# Patient Record
Sex: FEMALE | Race: WHITE | Employment: OTHER | URBAN - NONMETROPOLITAN AREA
[De-identification: names, ages, dates, MRNs, and addresses within clinical notes are randomized per-mention and may not be internally consistent; named-entity substitution may affect disease eponyms.]

---

## 2020-04-15 RX ORDER — OMEPRAZOLE 40 MG/1
40 CAPSULE, DELAYED RELEASE ORAL DAILY
COMMUNITY
End: 2021-01-11 | Stop reason: ALTCHOICE

## 2020-04-15 RX ORDER — FLUTICASONE PROPIONATE 50 MCG
2 SPRAY, SUSPENSION (ML) NASAL DAILY
COMMUNITY

## 2020-04-15 RX ORDER — AZELASTINE 1 MG/ML
1 SPRAY, METERED NASAL 2 TIMES DAILY
COMMUNITY
End: 2021-01-11 | Stop reason: SDUPTHER

## 2020-04-15 RX ORDER — LISINOPRIL 10 MG/1
10 TABLET ORAL DAILY
COMMUNITY
End: 2021-01-11 | Stop reason: ALTCHOICE

## 2020-04-15 RX ORDER — POTASSIUM CHLORIDE 750 MG/1
10 CAPSULE, EXTENDED RELEASE ORAL DAILY
COMMUNITY

## 2020-04-15 RX ORDER — BENZONATATE 200 MG/1
200 CAPSULE ORAL 3 TIMES DAILY PRN
COMMUNITY
End: 2020-07-10 | Stop reason: ALTCHOICE

## 2020-04-15 RX ORDER — ONDANSETRON 8 MG/1
4 TABLET, ORALLY DISINTEGRATING ORAL EVERY 8 HOURS PRN
COMMUNITY
End: 2020-07-10 | Stop reason: ALTCHOICE

## 2020-04-15 RX ORDER — PREDNISONE 20 MG/1
20 TABLET ORAL DAILY
COMMUNITY
End: 2020-05-04 | Stop reason: ALTCHOICE

## 2020-04-15 RX ORDER — MONTELUKAST SODIUM 4 MG/1
1 TABLET, CHEWABLE ORAL 2 TIMES DAILY
COMMUNITY
End: 2022-08-01

## 2020-04-15 RX ORDER — ALBUTEROL SULFATE 90 UG/1
2 AEROSOL, METERED RESPIRATORY (INHALATION) EVERY 6 HOURS PRN
COMMUNITY
End: 2022-11-09 | Stop reason: SDUPTHER

## 2020-04-15 RX ORDER — LORATADINE 10 MG/1
10 CAPSULE, LIQUID FILLED ORAL DAILY
COMMUNITY
End: 2021-01-11 | Stop reason: SDUPTHER

## 2020-04-15 RX ORDER — ALPRAZOLAM 0.25 MG/1
0.25 TABLET ORAL PRN
COMMUNITY
End: 2021-01-11 | Stop reason: SDUPTHER

## 2020-04-15 RX ORDER — BACLOFEN 20 MG/1
20 TABLET ORAL 4 TIMES DAILY
COMMUNITY
End: 2020-07-10 | Stop reason: SDUPTHER

## 2020-04-15 RX ORDER — ALBUTEROL SULFATE 2.5 MG/3ML
2.5 SOLUTION RESPIRATORY (INHALATION) 3 TIMES DAILY
COMMUNITY
End: 2022-11-09 | Stop reason: SDUPTHER

## 2020-04-15 RX ORDER — LANSOPRAZOLE 30 MG/1
30 CAPSULE, DELAYED RELEASE ORAL DAILY
COMMUNITY
End: 2020-07-10 | Stop reason: ALTCHOICE

## 2020-04-15 RX ORDER — MONTELUKAST SODIUM 10 MG/1
10 TABLET ORAL DAILY
COMMUNITY
End: 2020-07-10 | Stop reason: ALTCHOICE

## 2020-04-15 SDOH — HEALTH STABILITY: MENTAL HEALTH: HOW OFTEN DO YOU HAVE A DRINK CONTAINING ALCOHOL?: NEVER

## 2020-04-20 ENCOUNTER — HOSPITAL ENCOUNTER (OUTPATIENT)
Dept: LAB | Age: 65
Discharge: HOME | End: 2020-04-20
Payer: COMMERCIAL

## 2020-04-20 ENCOUNTER — OFFICE VISIT (OUTPATIENT)
Dept: ONCOLOGY | Age: 65
End: 2020-04-20
Payer: COMMERCIAL

## 2020-04-20 VITALS
OXYGEN SATURATION: 97 % | BODY MASS INDEX: 48.49 KG/M2 | DIASTOLIC BLOOD PRESSURE: 68 MMHG | WEIGHT: 284 LBS | HEART RATE: 77 BPM | SYSTOLIC BLOOD PRESSURE: 116 MMHG | TEMPERATURE: 97.8 F | HEIGHT: 64 IN

## 2020-04-20 PROBLEM — D50.0 IRON DEFICIENCY ANEMIA DUE TO CHRONIC BLOOD LOSS: Status: ACTIVE | Noted: 2020-04-20

## 2020-04-20 PROBLEM — K90.9 IRON MALABSORPTION: Status: ACTIVE | Noted: 2020-04-20

## 2020-04-20 LAB
ABSOLUTE EOS #: 0.21 K/UL (ref 0–0.44)
ABSOLUTE IMMATURE GRANULOCYTE: 0.03 K/UL (ref 0–0.3)
ABSOLUTE LYMPH #: 1.81 K/UL (ref 1.1–3.7)
ABSOLUTE MONO #: 0.5 K/UL (ref 0.1–1.2)
ABSOLUTE RETIC #: 0.05 M/UL (ref 0.03–0.08)
BASOPHILS # BLD: 1 % (ref 0–2)
BASOPHILS ABSOLUTE: 0.05 K/UL (ref 0–0.2)
DIFFERENTIAL TYPE: ABNORMAL
EOSINOPHILS RELATIVE PERCENT: 3 % (ref 1–4)
FERRITIN: 10 UG/L (ref 13–150)
FOLATE: 8.8 NG/ML
HAPTOGLOBIN: 274 MG/DL (ref 30–200)
HCT VFR BLD CALC: 34.3 % (ref 36.3–47.1)
HEMOGLOBIN: 10.2 G/DL (ref 11.9–15.1)
IMMATURE GRANULOCYTES: 0 %
IMMATURE RETIC FRACT: 30.9 % (ref 2.7–18.3)
IRON SATURATION: 5 % (ref 20–55)
IRON: 20 UG/DL (ref 37–145)
LACTATE DEHYDROGENASE: 166 U/L (ref 135–214)
LYMPHOCYTES # BLD: 26 % (ref 24–43)
MCH RBC QN AUTO: 24.4 PG (ref 25.2–33.5)
MCHC RBC AUTO-ENTMCNC: 29.7 G/DL (ref 25.2–33.5)
MCV RBC AUTO: 82.1 FL (ref 82.6–102.9)
MONOCYTES # BLD: 7 % (ref 3–12)
NRBC AUTOMATED: 0 PER 100 WBC
PDW BLD-RTO: 17.6 % (ref 11.8–14.4)
PLATELET # BLD: 409 K/UL (ref 138–453)
PLATELET ESTIMATE: ABNORMAL
PMV BLD AUTO: 8.6 FL (ref 8.1–13.5)
RBC # BLD: 4.18 M/UL (ref 3.95–5.11)
RBC # BLD: ABNORMAL 10*6/UL
RETIC %: 1.2 % (ref 0.5–1.9)
RETIC HEMOGLOBIN: 27.1 PG (ref 28.2–35.7)
SEG NEUTROPHILS: 63 % (ref 36–65)
SEGMENTED NEUTROPHILS ABSOLUTE COUNT: 4.46 K/UL (ref 1.5–8.1)
TOTAL IRON BINDING CAPACITY: 388 UG/DL (ref 250–450)
UNSATURATED IRON BINDING CAPACITY: 368 UG/DL (ref 112–347)
VITAMIN B-12: 414 PG/ML (ref 232–1245)
WBC # BLD: 7.1 K/UL (ref 3.5–11.3)
WBC # BLD: ABNORMAL 10*3/UL

## 2020-04-20 PROCEDURE — 85045 AUTOMATED RETICULOCYTE COUNT: CPT

## 2020-04-20 PROCEDURE — 99244 OFF/OP CNSLTJ NEW/EST MOD 40: CPT | Performed by: INTERNAL MEDICINE

## 2020-04-20 PROCEDURE — 85025 COMPLETE CBC W/AUTO DIFF WBC: CPT

## 2020-04-20 PROCEDURE — 82668 ASSAY OF ERYTHROPOIETIN: CPT

## 2020-04-20 PROCEDURE — 83550 IRON BINDING TEST: CPT

## 2020-04-20 PROCEDURE — 82728 ASSAY OF FERRITIN: CPT

## 2020-04-20 PROCEDURE — 36415 COLL VENOUS BLD VENIPUNCTURE: CPT

## 2020-04-20 PROCEDURE — 82607 VITAMIN B-12: CPT

## 2020-04-20 PROCEDURE — 82746 ASSAY OF FOLIC ACID SERUM: CPT

## 2020-04-20 PROCEDURE — 83615 LACTATE (LD) (LDH) ENZYME: CPT

## 2020-04-20 PROCEDURE — 83540 ASSAY OF IRON: CPT

## 2020-04-20 PROCEDURE — 83010 ASSAY OF HAPTOGLOBIN QUANT: CPT

## 2020-04-20 RX ORDER — DIPHENHYDRAMINE HYDROCHLORIDE 50 MG/ML
50 INJECTION INTRAMUSCULAR; INTRAVENOUS ONCE
Status: CANCELLED | OUTPATIENT
Start: 2020-04-20

## 2020-04-20 RX ORDER — METHYLPREDNISOLONE SODIUM SUCCINATE 125 MG/2ML
125 INJECTION, POWDER, LYOPHILIZED, FOR SOLUTION INTRAMUSCULAR; INTRAVENOUS ONCE
Status: CANCELLED | OUTPATIENT
Start: 2020-04-20

## 2020-04-20 RX ORDER — SODIUM CHLORIDE 9 MG/ML
INJECTION, SOLUTION INTRAVENOUS CONTINUOUS
Status: CANCELLED | OUTPATIENT
Start: 2020-04-20

## 2020-04-20 RX ORDER — SODIUM CHLORIDE 0.9 % (FLUSH) 0.9 %
5 SYRINGE (ML) INJECTION PRN
Status: CANCELLED | OUTPATIENT
Start: 2020-04-20

## 2020-04-20 RX ORDER — EPINEPHRINE 1 MG/ML
0.3 INJECTION, SOLUTION, CONCENTRATE INTRAVENOUS PRN
Status: CANCELLED | OUTPATIENT
Start: 2020-04-20

## 2020-04-20 RX ORDER — LANOLIN ALCOHOL/MO/W.PET/CERES
325 CREAM (GRAM) TOPICAL 2 TIMES DAILY
Qty: 60 TABLET | Refills: 5 | Status: SHIPPED | OUTPATIENT
Start: 2020-04-20 | End: 2021-01-06 | Stop reason: SDUPTHER

## 2020-04-20 RX ORDER — SODIUM CHLORIDE 0.9 % (FLUSH) 0.9 %
10 SYRINGE (ML) INJECTION PRN
Status: CANCELLED | OUTPATIENT
Start: 2020-04-20

## 2020-04-20 RX ORDER — HEPARIN SODIUM (PORCINE) LOCK FLUSH IV SOLN 100 UNIT/ML 100 UNIT/ML
500 SOLUTION INTRAVENOUS PRN
Status: CANCELLED | OUTPATIENT
Start: 2020-04-20

## 2020-04-20 NOTE — PROGRESS NOTES
Gastrointestinal: negative for nausea, vomiting, diarrhea, constipation, abdominal pain, Dysphagia, hematemesis and hematochezia   Genitourinary: negative for frequency, dysuria, nocturia, urinary incontinence, and hematuria   Integument: negative for rash, skin lesions, bruises.    Hematologic/Lymphatic: negative for easy bruising, bleeding, lymphadenopathy, petechiae and swelling/edema   Endocrine: negative for heat or cold intolerance, tremor, weight changes, change in bowel habits and hair loss   Musculoskeletal: negative for myalgias, arthralgias, pain, joint swelling,and bone pain   Neurological: negative for headaches, dizziness, seizures, weakness, numbness  OBJECTIVE:         Vitals:    04/20/20 1408   BP: 116/68   Pulse: 77   Temp: 97.8 °F (36.6 °C)   SpO2: 97%     PHYSICAL EXAM:   General appearance - well appearing, no in pain or distress   Mental status - alert and cooperative   Eyes - pupils equal and reactive, extraocular eye movements intact   Ears - bilateral TM's and external ear canals normal   Mouth - mucous membranes moist, pharynx normal without lesions   Neck - supple, no significant adenopathy   Lymphatics - no palpable lymphadenopathy, no hepatosplenomegaly   Chest - clear to auscultation, no wheezes, rales or rhonchi, symmetric air entry   Heart - normal rate, regular rhythm, normal S1, S2, no murmurs, rubs, clicks or gallops   Abdomen - soft, nontender, nondistended, no masses or organomegaly   Neurological - alert, oriented, normal speech, no focal findings or movement disorder noted   Musculoskeletal - no joint tenderness, deformity or swelling   Extremities - peripheral pulses normal, no pedal edema, no clubbing or cyanosis   Skin - normal coloration and turgor, no rashes, no suspicious skin lesions noted   LABORATORY DATA:   No results found for: WBC, HGB, HCT, MCV, PLT, LABLYMP, MID, GRAN, LYMPHOPCT, MIDPERCENT, GRANULOCYTES, RBC, MCH, MCHC, RDW, NEUTOPHILPCT, MONOPCT, EOSPCT,

## 2020-04-21 LAB — SURGICAL PATHOLOGY REPORT: NORMAL

## 2020-04-22 LAB — PATHOLOGIST REVIEW: NORMAL

## 2020-04-23 LAB — ERYTHROPOIETIN: 66 MU/ML (ref 4–27)

## 2020-04-29 ENCOUNTER — HOSPITAL ENCOUNTER (OUTPATIENT)
Dept: INFUSION THERAPY | Age: 65
Discharge: HOME | End: 2020-04-29
Payer: COMMERCIAL

## 2020-04-29 VITALS
SYSTOLIC BLOOD PRESSURE: 114 MMHG | TEMPERATURE: 97.4 F | HEART RATE: 77 BPM | RESPIRATION RATE: 16 BRPM | DIASTOLIC BLOOD PRESSURE: 57 MMHG | OXYGEN SATURATION: 96 %

## 2020-04-29 DIAGNOSIS — K90.9 IRON MALABSORPTION: Primary | ICD-10-CM

## 2020-04-29 DIAGNOSIS — D50.0 IRON DEFICIENCY ANEMIA DUE TO CHRONIC BLOOD LOSS: ICD-10-CM

## 2020-04-29 PROCEDURE — 2580000003 HC RX 258: Performed by: INTERNAL MEDICINE

## 2020-04-29 PROCEDURE — 6360000002 HC RX W HCPCS: Performed by: INTERNAL MEDICINE

## 2020-04-29 PROCEDURE — 96365 THER/PROPH/DIAG IV INF INIT: CPT

## 2020-04-29 RX ORDER — SODIUM CHLORIDE 9 MG/ML
INJECTION, SOLUTION INTRAVENOUS CONTINUOUS
Status: CANCELLED | OUTPATIENT
Start: 2020-05-06

## 2020-04-29 RX ORDER — METHYLPREDNISOLONE SODIUM SUCCINATE 125 MG/2ML
125 INJECTION, POWDER, LYOPHILIZED, FOR SOLUTION INTRAMUSCULAR; INTRAVENOUS ONCE
Status: CANCELLED | OUTPATIENT
Start: 2020-05-06

## 2020-04-29 RX ORDER — SODIUM CHLORIDE 9 MG/ML
INJECTION, SOLUTION INTRAVENOUS CONTINUOUS
Status: ACTIVE | OUTPATIENT
Start: 2020-04-29 | End: 2020-04-29

## 2020-04-29 RX ORDER — DIPHENHYDRAMINE HYDROCHLORIDE 50 MG/ML
50 INJECTION INTRAMUSCULAR; INTRAVENOUS ONCE
Status: CANCELLED | OUTPATIENT
Start: 2020-05-06

## 2020-04-29 RX ORDER — HEPARIN SODIUM (PORCINE) LOCK FLUSH IV SOLN 100 UNIT/ML 100 UNIT/ML
500 SOLUTION INTRAVENOUS PRN
Status: CANCELLED | OUTPATIENT
Start: 2020-05-06

## 2020-04-29 RX ORDER — SODIUM CHLORIDE 0.9 % (FLUSH) 0.9 %
5 SYRINGE (ML) INJECTION PRN
Status: CANCELLED | OUTPATIENT
Start: 2020-05-06

## 2020-04-29 RX ORDER — EPINEPHRINE 1 MG/ML
0.3 INJECTION, SOLUTION, CONCENTRATE INTRAVENOUS PRN
Status: CANCELLED | OUTPATIENT
Start: 2020-05-06

## 2020-04-29 RX ORDER — SODIUM CHLORIDE 0.9 % (FLUSH) 0.9 %
10 SYRINGE (ML) INJECTION PRN
Status: CANCELLED | OUTPATIENT
Start: 2020-05-06

## 2020-04-29 RX ADMIN — FERRIC CARBOXYMALTOSE INJECTION 750 MG: 50 INJECTION, SOLUTION INTRAVENOUS at 10:55

## 2020-04-29 RX ADMIN — SODIUM CHLORIDE: 9 INJECTION, SOLUTION INTRAVENOUS at 10:25

## 2020-04-29 NOTE — PROGRESS NOTES
Patient at infusion center for Injectafer infusion. IV accessed, NSS infusing. Patient states that she has tolerated IV iron infusions in the past without any difficulty.

## 2020-04-29 NOTE — PROGRESS NOTES
After 30 min observation time patient denies any complaints. VSS. IV d/c'd, coban to site. Patient discharged to home. Gait steady.

## 2020-05-01 ENCOUNTER — TELEPHONE (OUTPATIENT)
Dept: ONCOLOGY | Age: 65
End: 2020-05-01

## 2020-05-01 NOTE — TELEPHONE ENCOUNTER
Pt called in stating that had iron infusion on Wednesday and has just been really exhausted, very difficult to do anything, laying around all day until 4, and then going to bed early. Pt then went on to state that she takes her bp pills at night and checked her bp today and it was 99/56. Writer notified pt that she would need to contact prescribing physician of bp meds and notify them of blood pressure. Pt wanted to call our office to notify.

## 2020-05-04 ENCOUNTER — VIRTUAL VISIT (OUTPATIENT)
Dept: ONCOLOGY | Age: 65
End: 2020-05-04
Payer: COMMERCIAL

## 2020-05-04 VITALS
BODY MASS INDEX: 38.58 KG/M2 | WEIGHT: 226 LBS | SYSTOLIC BLOOD PRESSURE: 144 MMHG | OXYGEN SATURATION: 97 % | HEART RATE: 99 BPM | HEIGHT: 64 IN | DIASTOLIC BLOOD PRESSURE: 85 MMHG

## 2020-05-04 PROCEDURE — 99214 OFFICE O/P EST MOD 30 MIN: CPT | Performed by: INTERNAL MEDICINE

## 2020-05-04 NOTE — PROGRESS NOTES
2020    TELEHEALTH EVALUATION -- Audio[facility]Visual [facility]    HPI:    David Kaur (:  1955) has requested an audio/video evaluation for the following concern(s):    Chief Complaint   Patient presents with    Follow-up     iron malabsorption       Patient ID: Brian Penn, 1955, [id number], 60 y.o. Referred by : Logan Love, NP, APRN - CNP  Reason for consultation:   Anemia  HISTORY OF PRESENT ILLNESS:    Hematologic history:  Funmilayo Saunders is a 52-year-old female with history of chronic anemia was seen there initial consultation visit. She reports that she has history of anemia for past several years and for past 2 years she has been receiving intermittent PRBC and iron transfusions from her primary care physician. She reports her last transfusion was about in 2019 prior to her vascular surgery. She reports she has taken iron pills in the past but had significant GI side effects so she had stopped. She reports history of IBS and also had upper endoscopy and colonoscopy in 2018 was negative for any active bleeding. Her GI evaluation was done at [place]. Most recently her stool for occult blood was negative checked in March of this year. Her CBC on 3/18/2020 showed hemoglobin of 9.9, WBC 10.2, platelets 073. Her ferritin 13.3, iron 15, TIBC 366 and iron saturation 4% noted on 3/18/2020. INTERVAL HISTORY:   Funmilayo Saunders was seen during virtual visit for follow-up of her anemia and to discuss lab results and further recommendations. She has received 1 dose of IV iron and tolerated well. She denies any side effects. She is taking iron pill twice daily and tolerating well. She denies any unintentional weight loss, drenching night sweats or fever chills. During this visit patient's allergy, social, medical, surgical history and medications were reviewed and updated.     Past Medical History:   Diagnosis Date    Adjustment disorder with mixed emotional features file     Emotionally abused: Not on file     Physically abused: Not on file     Forced sexual activity: Not on file   Other Topics Concern    Not on file   Social History Narrative    Not on file     Family History   Problem Relation Age of Onset    Heart Disease Mother     High Blood Pressure Mother     COPD Father     Heart Disease Father     High Blood Pressure Father     Cancer Father         lung      REVIEW OF SYSTEM:   Constitutional: No fever or chills. No night sweats, no weight loss   Eyes: No eye discharge, double vision, or eye pain   HEENT: negative for sore mouth, sore throat, hoarseness and voice change   Respiratory: negative for cough , sputum, dyspnea, wheezing, hemoptysis, chest pain   Cardiovascular: negative for chest pain, dyspnea, palpitations, orthopnea, PND   Gastrointestinal: negative for nausea, vomiting, diarrhea, constipation, abdominal pain, Dysphagia, hematemesis and hematochezia   Genitourinary: negative for frequency, dysuria, nocturia, urinary incontinence, and hematuria   Integument: negative for rash, skin lesions, bruises.    Hematologic/Lymphatic: negative for easy bruising, bleeding, lymphadenopathy, petechiae and swelling/edema   Endocrine: negative for heat or cold intolerance, tremor, weight changes, change in bowel habits and hair loss   Musculoskeletal: negative for myalgias, arthralgias, pain, joint swelling,and bone pain   Neurological: negative for headaches, dizziness, seizures, weakness, numbness  OBJECTIVE:        Vitals:    05/04/20 1032   BP: (!) 144/85   Pulse: 99   SpO2: 97%     PHYSICAL EXAMINATION:  [ INSTRUCTIONS:  \"[x]\" Indicates a positive item  \"[]\" Indicates a negative item  -- DELETE ALL ITEMS NOT EXAMINED]  Vital Signs: (As obtained by patient/caregiver or practitioner observation)    Blood pressure-  Heart rate-    Respiratory rate-    Temperature-  Pulse oximetry-     Constitutional: [x] Appears well-developed and well-nourished [x] No apparent distress      [] Abnormal-   Mental status  [x] Alert and awake  [x] Oriented to person/place/time [x]Able to follow commands      Eyes:  EOM    [x]  Normal  [] Abnormal-  Sclera  [x]  Normal  [] Abnormal -         Discharge [x]  None visible  [] Abnormal -    HENT:   [x] Normocephalic, atraumatic.   [] Abnormal   [x] Mouth/Throat: Mucous membranes are moist.     External Ears [x] Normal  [] Abnormal-     Neck: [x] No visualized mass     Pulmonary/Chest: [x] Respiratory effort normal.  [x] No visualized signs of difficulty breathing or respiratory distress        [] Abnormal-      Musculoskeletal:   [x] Normal gait with no signs of ataxia         [x] Normal range of motion of neck        [] Abnormal-       Neurological:        [x] No Facial Asymmetry (Cranial nerve 7 motor function) (limited exam to video visit)          [x] No gaze palsy        [] Abnormal-         Skin:        [x] No significant exanthematous lesions or discoloration noted on facial skin         [] Abnormal-            Psychiatric:       [x] Normal Affect [x] No Hallucinations        [] Abnormal-     LABORATORY DATA:     Lab Results   Component Value Date    WBC 7.1 04/20/2020    HGB 10.2 (L) 04/20/2020    HCT 34.3 (L) 04/20/2020    MCV 82.1 (L) 04/20/2020     04/20/2020    LYMPHOPCT 26 04/20/2020    RBC 4.18 04/20/2020    MCH 24.4 (L) 04/20/2020    MCHC 29.7 04/20/2020    RDW 17.6 (H) 04/20/2020    MONOPCT 7 04/20/2020    BASOPCT 1 04/20/2020    NEUTROABS 4.46 04/20/2020    LYMPHSABS 1.81 04/20/2020    MONOSABS 0.50 04/20/2020    EOSABS 0.21 04/20/2020    BASOSABS 0.05 04/20/2020         Chemistry    No results found for: NA, K, CL, CO2, BUN, CREATININE No results found for: CALCIUM, ALKPHOS, AST, ALT, BILITOT   Iron Profile (03/18/2020 11:58 AM EDT)  Iron Profile (03/18/2020 11:58 AM EDT)   Component Value Ref Range Performed At [facility] Signature   Iron 15 (L) 39 - 145 ug/dL [facility]     TIBC 366 260 - 445 ug/dL

## 2020-05-06 ENCOUNTER — HOSPITAL ENCOUNTER (OUTPATIENT)
Dept: INFUSION THERAPY | Age: 65
Discharge: HOME | End: 2020-05-06
Payer: COMMERCIAL

## 2020-05-06 ENCOUNTER — CLINICAL DOCUMENTATION (OUTPATIENT)
Dept: SPIRITUAL SERVICES | Age: 65
End: 2020-05-06

## 2020-05-06 VITALS
DIASTOLIC BLOOD PRESSURE: 66 MMHG | TEMPERATURE: 96.9 F | OXYGEN SATURATION: 96 % | HEART RATE: 74 BPM | RESPIRATION RATE: 16 BRPM | SYSTOLIC BLOOD PRESSURE: 130 MMHG

## 2020-05-06 DIAGNOSIS — K90.9 IRON MALABSORPTION: Primary | ICD-10-CM

## 2020-05-06 DIAGNOSIS — D50.0 IRON DEFICIENCY ANEMIA DUE TO CHRONIC BLOOD LOSS: ICD-10-CM

## 2020-05-06 PROCEDURE — 6360000002 HC RX W HCPCS: Performed by: INTERNAL MEDICINE

## 2020-05-06 PROCEDURE — 96365 THER/PROPH/DIAG IV INF INIT: CPT

## 2020-05-06 PROCEDURE — 2580000003 HC RX 258: Performed by: INTERNAL MEDICINE

## 2020-05-06 RX ORDER — SODIUM CHLORIDE 9 MG/ML
INJECTION, SOLUTION INTRAVENOUS CONTINUOUS
Status: ACTIVE | OUTPATIENT
Start: 2020-05-06 | End: 2020-05-06

## 2020-05-06 RX ORDER — SODIUM CHLORIDE 0.9 % (FLUSH) 0.9 %
10 SYRINGE (ML) INJECTION PRN
Status: CANCELLED | OUTPATIENT
Start: 2020-05-13

## 2020-05-06 RX ORDER — HEPARIN SODIUM (PORCINE) LOCK FLUSH IV SOLN 100 UNIT/ML 100 UNIT/ML
500 SOLUTION INTRAVENOUS PRN
Status: CANCELLED | OUTPATIENT
Start: 2020-05-13

## 2020-05-06 RX ORDER — EPINEPHRINE 1 MG/ML
0.3 INJECTION, SOLUTION, CONCENTRATE INTRAVENOUS PRN
Status: CANCELLED | OUTPATIENT
Start: 2020-05-13

## 2020-05-06 RX ORDER — SODIUM CHLORIDE 9 MG/ML
INJECTION, SOLUTION INTRAVENOUS CONTINUOUS
Status: CANCELLED | OUTPATIENT
Start: 2020-05-13

## 2020-05-06 RX ORDER — METHYLPREDNISOLONE SODIUM SUCCINATE 125 MG/2ML
125 INJECTION, POWDER, LYOPHILIZED, FOR SOLUTION INTRAMUSCULAR; INTRAVENOUS ONCE
Status: CANCELLED | OUTPATIENT
Start: 2020-05-13

## 2020-05-06 RX ORDER — SODIUM CHLORIDE 0.9 % (FLUSH) 0.9 %
5 SYRINGE (ML) INJECTION PRN
Status: CANCELLED | OUTPATIENT
Start: 2020-05-13

## 2020-05-06 RX ORDER — DIPHENHYDRAMINE HYDROCHLORIDE 50 MG/ML
50 INJECTION INTRAMUSCULAR; INTRAVENOUS ONCE
Status: CANCELLED | OUTPATIENT
Start: 2020-05-13

## 2020-05-06 RX ADMIN — FERRIC CARBOXYMALTOSE INJECTION 750 MG: 50 INJECTION, SOLUTION INTRAVENOUS at 10:58

## 2020-05-06 RX ADMIN — SODIUM CHLORIDE: 9 INJECTION, SOLUTION INTRAVENOUS at 11:00

## 2020-06-15 ENCOUNTER — HOSPITAL ENCOUNTER (OUTPATIENT)
Dept: LAB | Age: 65
Discharge: HOME | End: 2020-06-15
Payer: COMMERCIAL

## 2020-06-15 LAB
ABSOLUTE EOS #: 0.1 K/UL (ref 0–0.44)
ABSOLUTE IMMATURE GRANULOCYTE: 0 K/UL (ref 0–0.3)
ABSOLUTE LYMPH #: 1.65 K/UL (ref 1.1–3.7)
ABSOLUTE MONO #: 0.35 K/UL (ref 0.1–1.2)
BASOPHILS # BLD: 1 % (ref 0–2)
BASOPHILS ABSOLUTE: 0.05 K/UL (ref 0–0.2)
DIFFERENTIAL TYPE: ABNORMAL
EOSINOPHILS RELATIVE PERCENT: 2 % (ref 1–4)
FERRITIN: 302 UG/L (ref 13–150)
HCT VFR BLD CALC: 40.4 % (ref 36.3–47.1)
HEMOGLOBIN: 13 G/DL (ref 11.9–15.1)
IMMATURE GRANULOCYTES: 0 %
IRON SATURATION: 43 % (ref 20–55)
IRON: 106 UG/DL (ref 37–145)
LYMPHOCYTES # BLD: 33 % (ref 24–43)
MCH RBC QN AUTO: 30.4 PG (ref 25.2–33.5)
MCHC RBC AUTO-ENTMCNC: 32.2 G/DL (ref 25.2–33.5)
MCV RBC AUTO: 94.4 FL (ref 82.6–102.9)
MONOCYTES # BLD: 7 % (ref 3–12)
MORPHOLOGY: ABNORMAL
NRBC AUTOMATED: 0 PER 100 WBC
PDW BLD-RTO: 21 % (ref 11.8–14.4)
PLATELET # BLD: 256 K/UL (ref 138–453)
PLATELET ESTIMATE: ABNORMAL
PMV BLD AUTO: 9.4 FL (ref 8.1–13.5)
RBC # BLD: 4.28 M/UL (ref 3.95–5.11)
RBC # BLD: ABNORMAL 10*6/UL
SEG NEUTROPHILS: 57 % (ref 36–65)
SEGMENTED NEUTROPHILS ABSOLUTE COUNT: 2.85 K/UL (ref 1.5–8.1)
TOTAL IRON BINDING CAPACITY: 246 UG/DL (ref 250–450)
UNSATURATED IRON BINDING CAPACITY: 140 UG/DL (ref 112–347)
WBC # BLD: 5 K/UL (ref 3.5–11.3)
WBC # BLD: ABNORMAL 10*3/UL

## 2020-06-15 PROCEDURE — 83550 IRON BINDING TEST: CPT

## 2020-06-15 PROCEDURE — 82728 ASSAY OF FERRITIN: CPT

## 2020-06-15 PROCEDURE — 36415 COLL VENOUS BLD VENIPUNCTURE: CPT

## 2020-06-15 PROCEDURE — 85025 COMPLETE CBC W/AUTO DIFF WBC: CPT

## 2020-06-15 PROCEDURE — 83516 IMMUNOASSAY NONANTIBODY: CPT

## 2020-06-15 PROCEDURE — 83540 ASSAY OF IRON: CPT

## 2020-06-15 PROCEDURE — 82784 ASSAY IGA/IGD/IGG/IGM EACH: CPT

## 2020-06-16 LAB
GLIADIN DEAMINIDATED PEPTIDE AB IGA: 90 U/ML
GLIADIN DEAMINIDATED PEPTIDE AB IGG: 16 U/ML
IGA: 73 MG/DL (ref 70–400)
TISSUE TRANSGLUTAMINASE IGA: 0.5 U/ML

## 2020-06-22 ENCOUNTER — OFFICE VISIT (OUTPATIENT)
Dept: ONCOLOGY | Age: 65
End: 2020-06-22
Payer: COMMERCIAL

## 2020-06-22 VITALS
HEIGHT: 64 IN | TEMPERATURE: 97.5 F | WEIGHT: 241 LBS | BODY MASS INDEX: 41.15 KG/M2 | RESPIRATION RATE: 16 BRPM | OXYGEN SATURATION: 95 % | HEART RATE: 91 BPM | SYSTOLIC BLOOD PRESSURE: 138 MMHG | DIASTOLIC BLOOD PRESSURE: 80 MMHG

## 2020-06-22 PROCEDURE — 99214 OFFICE O/P EST MOD 30 MIN: CPT | Performed by: INTERNAL MEDICINE

## 2020-06-22 RX ORDER — BUPROPION HYDROCHLORIDE 150 MG/1
150 TABLET ORAL EVERY MORNING
COMMUNITY
End: 2021-01-11 | Stop reason: ALTCHOICE

## 2020-07-10 ENCOUNTER — OFFICE VISIT (OUTPATIENT)
Dept: FAMILY MEDICINE CLINIC | Age: 65
End: 2020-07-10
Payer: COMMERCIAL

## 2020-07-10 VITALS
BODY MASS INDEX: 41.15 KG/M2 | SYSTOLIC BLOOD PRESSURE: 128 MMHG | TEMPERATURE: 97.1 F | HEIGHT: 64 IN | OXYGEN SATURATION: 98 % | WEIGHT: 241 LBS | HEART RATE: 86 BPM | DIASTOLIC BLOOD PRESSURE: 72 MMHG

## 2020-07-10 PROBLEM — Z88.9 HISTORY OF MULTIPLE ALLERGIES: Status: ACTIVE | Noted: 2020-02-29

## 2020-07-10 PROBLEM — D64.9 ANEMIA: Status: ACTIVE | Noted: 2020-02-29

## 2020-07-10 PROBLEM — M70.62 TROCHANTERIC BURSITIS OF LEFT HIP: Status: ACTIVE | Noted: 2020-07-10

## 2020-07-10 PROBLEM — M51.369 DEGENERATION OF LUMBAR INTERVERTEBRAL DISC: Status: ACTIVE | Noted: 2020-02-29

## 2020-07-10 PROBLEM — G47.33 OBSTRUCTIVE SLEEP APNEA SYNDROME: Status: ACTIVE | Noted: 2017-12-06

## 2020-07-10 PROBLEM — M25.619 DECREASED RANGE OF MOTION OF SHOULDER: Status: ACTIVE | Noted: 2020-07-10

## 2020-07-10 PROBLEM — D50.9 IRON DEFICIENCY ANEMIA: Status: ACTIVE | Noted: 2020-02-29

## 2020-07-10 PROBLEM — K59.04 CHRONIC IDIOPATHIC CONSTIPATION: Status: ACTIVE | Noted: 2020-02-29

## 2020-07-10 PROBLEM — K64.5 THROMBOSED HEMORRHOIDS: Status: ACTIVE | Noted: 2020-02-29

## 2020-07-10 PROBLEM — R40.0 SOMNOLENCE: Status: ACTIVE | Noted: 2020-07-10

## 2020-07-10 PROBLEM — F43.9 STRESS: Status: ACTIVE | Noted: 2020-07-10

## 2020-07-10 PROBLEM — F41.9 ANXIETY: Status: ACTIVE | Noted: 2020-02-29

## 2020-07-10 PROBLEM — D13.1 BENIGN NEOPLASM OF STOMACH: Status: ACTIVE | Noted: 2020-07-10

## 2020-07-10 PROBLEM — F43.29 ADJUSTMENT DISORDER WITH MIXED EMOTIONAL FEATURES: Status: ACTIVE | Noted: 2020-02-29

## 2020-07-10 PROBLEM — F32.A DEPRESSION: Status: ACTIVE | Noted: 2020-07-10

## 2020-07-10 PROBLEM — M54.30 SCIATICA: Status: ACTIVE | Noted: 2020-02-29

## 2020-07-10 PROBLEM — R60.0 EDEMA OF LOWER EXTREMITY: Status: ACTIVE | Noted: 2017-12-06

## 2020-07-10 PROBLEM — M19.90 OSTEOARTHROSIS: Status: ACTIVE | Noted: 2020-02-29
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
## 2020-07-10 PROBLEM — M79.9 DISORDER OF SOFT TISSUE: Status: ACTIVE | Noted: 2020-07-10

## 2020-07-10 PROBLEM — B00.9 HERPES SIMPLEX: Status: ACTIVE | Noted: 2020-02-29

## 2020-07-10 PROBLEM — F40.01 PANIC DISORDER WITH AGORAPHOBIA: Status: ACTIVE | Noted: 2020-07-10

## 2020-07-10 PROBLEM — M16.10 PRIMARY LOCALIZED OSTEOARTHRITIS OF PELVIC REGION AND THIGH: Status: ACTIVE | Noted: 2020-07-10

## 2020-07-10 PROBLEM — M25.519 SHOULDER PAIN: Status: ACTIVE | Noted: 2020-07-10

## 2020-07-10 PROBLEM — S46.012A STRAIN OF TENDON OF LEFT ROTATOR CUFF: Status: ACTIVE | Noted: 2020-07-10

## 2020-07-10 PROBLEM — M25.551 RIGHT HIP PAIN: Status: ACTIVE | Noted: 2020-07-10

## 2020-07-10 PROBLEM — G89.29 CHRONIC PAIN: Status: ACTIVE | Noted: 2020-02-29

## 2020-07-10 PROBLEM — M51.36 DEGENERATION OF LUMBAR INTERVERTEBRAL DISC: Status: ACTIVE | Noted: 2020-02-29

## 2020-07-10 PROBLEM — I50.30 DIASTOLIC HEART FAILURE (HCC): Status: ACTIVE | Noted: 2020-02-29

## 2020-07-10 PROBLEM — D12.6 BENIGN NEOPLASM OF COLON: Status: ACTIVE | Noted: 2020-02-29

## 2020-07-10 PROCEDURE — 99214 OFFICE O/P EST MOD 30 MIN: CPT | Performed by: NURSE PRACTITIONER

## 2020-07-10 RX ORDER — BACLOFEN 20 MG/1
20 TABLET ORAL 4 TIMES DAILY
Qty: 60 TABLET | Refills: 2 | Status: SHIPPED | OUTPATIENT
Start: 2020-07-10 | End: 2021-01-11 | Stop reason: ALTCHOICE

## 2020-07-10 RX ORDER — FUROSEMIDE 40 MG/1
40 TABLET ORAL DAILY
COMMUNITY

## 2020-07-10 SDOH — HEALTH STABILITY: MENTAL HEALTH: HOW OFTEN DO YOU HAVE A DRINK CONTAINING ALCOHOL?: MONTHLY OR LESS

## 2020-07-10 SDOH — HEALTH STABILITY: MENTAL HEALTH: HOW MANY STANDARD DRINKS CONTAINING ALCOHOL DO YOU HAVE ON A TYPICAL DAY?: 1 OR 2

## 2020-07-10 NOTE — PROGRESS NOTES
2017    Osteoarthritis     Sciatica     Thrombosed hemorrhoids        No past surgical history on file.     Social History     Socioeconomic History    Marital status:      Spouse name: None    Number of children: None    Years of education: None    Highest education level: None   Occupational History    None   Social Needs    Financial resource strain: None    Food insecurity     Worry: None     Inability: None    Transportation needs     Medical: None     Non-medical: None   Tobacco Use    Smoking status: Former Smoker     Packs/day: 1.00     Types: Cigarettes     Start date: 7/10/1970     Last attempt to quit: 7/10/1984     Years since quittin.0    Smokeless tobacco: Never Used    Tobacco comment: quit cold turkey   Substance and Sexual Activity    Alcohol use: Yes     Frequency: Monthly or less     Drinks per session: 1 or 2     Binge frequency: Never    Drug use: Never    Sexual activity: None   Lifestyle    Physical activity     Days per week: None     Minutes per session: None    Stress: None   Relationships    Social connections     Talks on phone: None     Gets together: None     Attends Zoroastrianism service: None     Active member of club or organization: None     Attends meetings of clubs or organizations: None     Relationship status: None    Intimate partner violence     Fear of current or ex partner: None     Emotionally abused: None     Physically abused: None     Forced sexual activity: None   Other Topics Concern    None   Social History Narrative    None       Family History   Problem Relation Age of Onset    Heart Disease Mother     High Blood Pressure Mother     COPD Father     Heart Disease Father     High Blood Pressure Father     Cancer Father         lung       Allergies   Allergen Reactions    Amitriptyline     Amoxicillin-Pot Clavulanate     Aspirin     Capsaicin-Menthol-Methyl Sal     Eggs Or Egg-Derived Products     Morphine     Nitrofurantoin Monohyd Macro     Nsaids        Current Outpatient Medications   Medication Sig Dispense Refill    furosemide (LASIX) 40 MG tablet Take 40 mg by mouth daily      baclofen (LIORESAL) 20 MG tablet Take 1 tablet by mouth 4 times daily 60 tablet 2    buPROPion (WELLBUTRIN XL) 150 MG extended release tablet Take 150 mg by mouth every morning      ferrous sulfate (FE TABS 325) 325 (65 Fe) MG EC tablet Take 1 tablet by mouth 2 times daily 60 tablet 5    colestipol (COLESTID) 1 g tablet Take 1 g by mouth 2 times daily Take 2 tablets by mouth two times a day      fluticasone-salmeterol (ADVAIR DISKUS) 100-50 MCG/DOSE diskus inhaler Inhale 1 puff into the lungs every 12 hours      lisinopril (PRINIVIL;ZESTRIL) 10 MG tablet Take 10 mg by mouth daily      loratadine (CLARITIN) 10 MG capsule Take 10 mg by mouth daily      omeprazole (PRILOSEC) 40 MG delayed release capsule Take 40 mg by mouth daily      potassium chloride (MICRO-K) 10 MEQ extended release capsule Take 10 mEq by mouth daily      fluticasone (FLONASE) 50 MCG/ACT nasal spray 2 sprays by Each Nostril route daily      albuterol sulfate HFA (VENTOLIN HFA) 108 (90 Base) MCG/ACT inhaler Inhale 2 puffs into the lungs every 6 hours as needed for Wheezing      albuterol (PROVENTIL) (2.5 MG/3ML) 0.083% nebulizer solution Take 2.5 mg by nebulization 3 times daily      ALPRAZolam (XANAX) 0.25 MG tablet Take 0.25 mg by mouth as needed for Sleep.  azelastine (ASTELIN) 0.1 % nasal spray 1 spray by Nasal route 2 times daily Use in each nostril as directed       No current facility-administered medications for this visit. Review of Systems   Constitutional: Positive for activity change. Negative for appetite change, fatigue and fever. HENT: Negative for hoarse voice, postnasal drip, rhinorrhea and sore throat. Respiratory: Negative for cough, shortness of breath and wheezing. Cardiovascular: Positive for leg swelling.  Negative for chest pain and palpitations. Gastrointestinal: Negative for diarrhea, nausea and vomiting. Musculoskeletal: Negative for myalgias. Bilateral hip pain    Skin: Negative. Neurological: Negative for dizziness, tingling, light-headedness, numbness and headaches. Psychiatric/Behavioral: Negative. Prior to Visit Medications    Medication Sig Taking?  Authorizing Provider   furosemide (LASIX) 40 MG tablet Take 40 mg by mouth daily Yes Historical Provider, MD   baclofen (LIORESAL) 20 MG tablet Take 1 tablet by mouth 4 times daily Yes DIAMOND Zhang CNP   buPROPion (WELLBUTRIN XL) 150 MG extended release tablet Take 150 mg by mouth every morning Yes Historical Provider, MD   ferrous sulfate (FE TABS 325) 325 (65 Fe) MG EC tablet Take 1 tablet by mouth 2 times daily Yes Francia Lucas MD   colestipol (COLESTID) 1 g tablet Take 1 g by mouth 2 times daily Take 2 tablets by mouth two times a day Yes Historical Provider, MD   fluticasone-salmeterol (ADVAIR DISKUS) 100-50 MCG/DOSE diskus inhaler Inhale 1 puff into the lungs every 12 hours Yes Historical Provider, MD   lisinopril (PRINIVIL;ZESTRIL) 10 MG tablet Take 10 mg by mouth daily Yes Historical Provider, MD   loratadine (CLARITIN) 10 MG capsule Take 10 mg by mouth daily Yes Historical Provider, MD   omeprazole (PRILOSEC) 40 MG delayed release capsule Take 40 mg by mouth daily Yes Historical Provider, MD   potassium chloride (MICRO-K) 10 MEQ extended release capsule Take 10 mEq by mouth daily Yes Historical Provider, MD   fluticasone (FLONASE) 50 MCG/ACT nasal spray 2 sprays by Each Nostril route daily Yes Historical Provider, MD   albuterol sulfate HFA (VENTOLIN HFA) 108 (90 Base) MCG/ACT inhaler Inhale 2 puffs into the lungs every 6 hours as needed for Wheezing  Historical Provider, MD   albuterol (PROVENTIL) (2.5 MG/3ML) 0.083% nebulizer solution Take 2.5 mg by nebulization 3 times daily  Historical Provider, MD   ALPRAZolam (XANAX) 0.25 MG tablet Take 0.25 mg by mouth as needed for Sleep. Historical Provider, MD   azelastine (ASTELIN) 0.1 % nasal spray 1 spray by Nasal route 2 times daily Use in each nostril as directed  Historical Provider, MD        Social History     Tobacco Use    Smoking status: Former Smoker     Packs/day: 1.00     Types: Cigarettes     Start date: 7/10/1970     Last attempt to quit: 7/10/1984     Years since quittin.0    Smokeless tobacco: Never Used    Tobacco comment: quit cold turkey   Substance Use Topics    Alcohol use: Yes     Frequency: Monthly or less     Drinks per session: 1 or 2     Binge frequency: Never        Vitals:    07/10/20 1339   BP: 128/72   Site: Right Upper Arm   Position: Sitting   Cuff Size: Large Adult   Pulse: 86   Temp: 97.1 °F (36.2 °C)   SpO2: 98%   Weight: 241 lb (109.3 kg)   Height: 5' 4\" (1.626 m)     Estimated body mass index is 41.37 kg/m² as calculated from the following:    Height as of this encounter: 5' 4\" (1.626 m). Weight as of this encounter: 241 lb (109.3 kg). Physical Exam  Vitals signs and nursing note reviewed. Constitutional:       Appearance: Normal appearance. She is obese. HENT:      Head: Normocephalic and atraumatic. Cardiovascular:      Rate and Rhythm: Normal rate and regular rhythm. Heart sounds: Normal heart sounds. Pulmonary:      Effort: Pulmonary effort is normal.      Breath sounds: Normal breath sounds. Musculoskeletal:      Right hip: She exhibits tenderness. She exhibits normal range of motion and normal strength. Left hip: She exhibits tenderness. She exhibits normal range of motion and normal strength. Legs:    Skin:     Capillary Refill: Capillary refill takes less than 2 seconds. Neurological:      General: No focal deficit present. Mental Status: She is alert and oriented to person, place, and time. ASSESSMENT/PLAN:  1. Essential hypertension  Stable continue current medication     2. Hyperlipidemia, unspecified hyperlipidemia type  Due for labs   - Lipid Panel; Future  - Comprehensive Metabolic Panel; Future    3. Trochanteric bursitis of left hip  Will give baclofen for the bursitis. Will refer to orthopedics for evaluation   - Nitin Simmons MD, Orthopedic Surgery, [place]    4. Elevated blood sugar    - Hemoglobin A1C; Future    5. Pulmonary emphysema, unspecified emphysema type ([address]  Stable continue current treatment     6. Anxiety  Stable continue current treatment    7. Iron deficiency anemia, unspecified iron deficiency anemia type  Appears stable by lab results  Continue following with oncology/hematology       Return in about 6 months (around 1/10/2021), or if symptoms worsen or fail to improve. An electronic signature was used to authenticate this note.     --DIAMOND Nair - CNP on 7/12/2020 at 5:42 PM

## 2020-07-12 ASSESSMENT — ENCOUNTER SYMPTOMS
WHEEZING: 0
NAUSEA: 0
VOMITING: 0
SHORTNESS OF BREATH: 0
COUGH: 0
DIARRHEA: 0

## 2020-07-13 ASSESSMENT — ENCOUNTER SYMPTOMS
SORE THROAT: 0
HOARSE VOICE: 0
RHINORRHEA: 0

## 2020-07-13 ASSESSMENT — COPD QUESTIONNAIRES: COPD: 1

## 2020-07-17 ENCOUNTER — OFFICE VISIT (OUTPATIENT)
Dept: ORTHOPEDIC SURGERY | Age: 65
End: 2020-07-17
Payer: COMMERCIAL

## 2020-07-17 ENCOUNTER — HOSPITAL ENCOUNTER (OUTPATIENT)
Dept: GENERAL RADIOLOGY | Age: 65
Discharge: HOME | End: 2020-07-19
Payer: COMMERCIAL

## 2020-07-17 VITALS
WEIGHT: 241 LBS | HEIGHT: 64 IN | HEART RATE: 94 BPM | OXYGEN SATURATION: 96 % | DIASTOLIC BLOOD PRESSURE: 60 MMHG | SYSTOLIC BLOOD PRESSURE: 122 MMHG | BODY MASS INDEX: 41.15 KG/M2 | TEMPERATURE: 97.5 F

## 2020-07-17 PROCEDURE — 99203 OFFICE O/P NEW LOW 30 MIN: CPT | Performed by: PHYSICIAN ASSISTANT

## 2020-07-17 PROCEDURE — 20610 DRAIN/INJ JOINT/BURSA W/O US: CPT | Performed by: PHYSICIAN ASSISTANT

## 2020-07-17 PROCEDURE — 73502 X-RAY EXAM HIP UNI 2-3 VIEWS: CPT

## 2020-07-17 RX ORDER — LIDOCAINE HYDROCHLORIDE 10 MG/ML
2 INJECTION, SOLUTION INFILTRATION; PERINEURAL ONCE
Status: COMPLETED | OUTPATIENT
Start: 2020-07-17 | End: 2020-07-17

## 2020-07-17 RX ORDER — BUPIVACAINE HYDROCHLORIDE 5 MG/ML
2 INJECTION, SOLUTION PERINEURAL ONCE
Status: COMPLETED | OUTPATIENT
Start: 2020-07-17 | End: 2020-07-17

## 2020-07-17 RX ORDER — METHYLPREDNISOLONE ACETATE 40 MG/ML
80 INJECTION, SUSPENSION INTRA-ARTICULAR; INTRALESIONAL; INTRAMUSCULAR; SOFT TISSUE ONCE
Status: COMPLETED | OUTPATIENT
Start: 2020-07-17 | End: 2020-07-17

## 2020-07-17 RX ADMIN — BUPIVACAINE HYDROCHLORIDE 10 MG: 5 INJECTION, SOLUTION PERINEURAL at 15:01

## 2020-07-17 RX ADMIN — BUPIVACAINE HYDROCHLORIDE 10 MG: 5 INJECTION, SOLUTION PERINEURAL at 15:03

## 2020-07-17 RX ADMIN — METHYLPREDNISOLONE ACETATE 80 MG: 40 INJECTION, SUSPENSION INTRA-ARTICULAR; INTRALESIONAL; INTRAMUSCULAR; SOFT TISSUE at 15:05

## 2020-07-17 RX ADMIN — METHYLPREDNISOLONE ACETATE 80 MG: 40 INJECTION, SUSPENSION INTRA-ARTICULAR; INTRALESIONAL; INTRAMUSCULAR; SOFT TISSUE at 15:04

## 2020-07-17 RX ADMIN — LIDOCAINE HYDROCHLORIDE 2 ML: 10 INJECTION, SOLUTION INFILTRATION; PERINEURAL at 15:03

## 2020-07-17 RX ADMIN — LIDOCAINE HYDROCHLORIDE 2 ML: 10 INJECTION, SOLUTION INFILTRATION; PERINEURAL at 15:04

## 2020-07-17 NOTE — PROGRESS NOTES
325) 325 (65 Fe) MG EC tablet Take 1 tablet by mouth 2 times daily 60 tablet 5    albuterol (PROVENTIL) (2.5 MG/3ML) 0.083% nebulizer solution Take 2.5 mg by nebulization 3 times daily      colestipol (COLESTID) 1 g tablet Take 1 g by mouth 2 times daily Take 2 tablets by mouth two times a day      fluticasone-salmeterol (ADVAIR DISKUS) 100-50 MCG/DOSE diskus inhaler Inhale 1 puff into the lungs every 12 hours      lisinopril (PRINIVIL;ZESTRIL) 10 MG tablet Take 10 mg by mouth daily      loratadine (CLARITIN) 10 MG capsule Take 10 mg by mouth daily      omeprazole (PRILOSEC) 40 MG delayed release capsule Take 40 mg by mouth daily      potassium chloride (MICRO-K) 10 MEQ extended release capsule Take 10 mEq by mouth daily      azelastine (ASTELIN) 0.1 % nasal spray 1 spray by Nasal route 2 times daily Use in each nostril as directed      fluticasone (FLONASE) 50 MCG/ACT nasal spray 2 sprays by Each Nostril route daily      baclofen (LIORESAL) 20 MG tablet Take 1 tablet by mouth 4 times daily (Patient not taking: Reported on 2020) 60 tablet 2    albuterol sulfate HFA (VENTOLIN HFA) 108 (90 Base) MCG/ACT inhaler Inhale 2 puffs into the lungs every 6 hours as needed for Wheezing       ALPRAZolam (XANAX) 0.25 MG tablet Take 0.25 mg by mouth as needed for Sleep. No current facility-administered medications for this visit. Allergies:  Amitriptyline;  Amoxicillin-pot clavulanate; Aspirin; Capsaicin-menthol-methyl sal; Eggs or egg-derived products; Morphine; Nitrofurantoin monohyd macro; and Nsaids    Social History:   Social History     Tobacco Use   Smoking Status Former Smoker    Packs/day: 1.00    Types: Cigarettes    Start date: 7/10/1970   Eyvonne Clonts Last attempt to quit: 7/10/1984    Years since quittin.0   Smokeless Tobacco Never Used   Tobacco Comment    quit cold turkey     Social History     Substance and Sexual Activity   Alcohol Use Yes    Frequency: Monthly or less    Drinks per session: 1 or 2    Binge frequency: Never     Social History     Substance and Sexual Activity   Drug Use Never       Family History:  Family History   Problem Relation Age of Onset    Heart Disease Mother     High Blood Pressure Mother     COPD Father     Heart Disease Father     High Blood Pressure Father     Cancer Father         lung         REVIEW OF SYSTEMS:  Please see the ROS form attached to today's encounter. I have reviewed it with the patient during the visit. All other systems were reviewed and are negative. PHYSICAL EXAM:  Patient is a age-appropriate 72-year-old female, alert and oriented ×3 and in no acute distress. Well-dressed and well-groomed and stands with normal body position. In a calm mood. Patient is normocephalic and exhibits nonlabored respirations. Clear conjunctiva and pupils that are reactive. She has no pain with hip range of motion in the groin. Logroll test is negative. Negative straight leg raise. Intact lumbar range of motion without instability or abnormality. Full hip range of motion. Tender to palpation over the trochanteric bursa bilaterally and this reproduces her symptoms. No erythema, edema, ecchymosis or warmth. Full knee range of motion bilaterally with discomfort on the right. Normal muscle tone and bulk. No lymphadenopathy. No open wounds, masses, or skin lesions. Deep tendon reflexes are intact and symmetric. Skin is intact. Palpable pulses distally. Brisk capillary refill. Radiology:  2 views of the left hip and an AP of the pelvis were obtained today in clinic. Radiographs show no evidence of fracture, AVN or collapse. There is mild hip arthritis bilaterally. ASSESSMENT/PLAN:  1. Trochanteric bursitis of both hips        We have discussed continued treatment with the patient at length. I have recommended conservative treatment.  I have recommended a repeat corticosteroid injection given her significant improvement in the past. Risks and benefits were discussed with the patient and they have elected to proceed with bilateral corticosteroid injections into the trochanteric bursa. Injections were performed using a combination of 2cc of Depo-Medrol and local anesthetic into the trochanteric bursa bilaterally. The patient tolerated the injections well. We will follow up in 1 week for evaluation of right knee pain with right knee radiographs. No orders of the defined types were placed in this encounter.        Beba Botello PA-C
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
## 2020-08-19 ENCOUNTER — HOSPITAL ENCOUNTER (OUTPATIENT)
Dept: GENERAL RADIOLOGY | Age: 65
Discharge: HOME | End: 2020-08-21
Payer: COMMERCIAL

## 2020-08-19 ENCOUNTER — OFFICE VISIT (OUTPATIENT)
Dept: PRIMARY CARE CLINIC | Age: 65
End: 2020-08-19
Payer: COMMERCIAL

## 2020-08-19 VITALS
HEART RATE: 74 BPM | WEIGHT: 234 LBS | BODY MASS INDEX: 40.17 KG/M2 | SYSTOLIC BLOOD PRESSURE: 120 MMHG | OXYGEN SATURATION: 98 % | DIASTOLIC BLOOD PRESSURE: 74 MMHG | TEMPERATURE: 98.2 F

## 2020-08-19 PROCEDURE — 73590 X-RAY EXAM OF LOWER LEG: CPT

## 2020-08-19 PROCEDURE — 99214 OFFICE O/P EST MOD 30 MIN: CPT | Performed by: PHYSICIAN ASSISTANT

## 2020-08-19 RX ORDER — ACETAMINOPHEN AND CODEINE PHOSPHATE 300; 30 MG/1; MG/1
1 TABLET ORAL EVERY 8 HOURS PRN
Qty: 18 TABLET | Refills: 0 | Status: SHIPPED | OUTPATIENT
Start: 2020-08-19 | End: 2020-08-22

## 2020-08-19 RX ORDER — SULFAMETHOXAZOLE AND TRIMETHOPRIM 800; 160 MG/1; MG/1
1 TABLET ORAL 2 TIMES DAILY
Qty: 14 TABLET | Refills: 0 | Status: SHIPPED | OUTPATIENT
Start: 2020-08-19 | End: 2020-08-26

## 2020-08-19 ASSESSMENT — PATIENT HEALTH QUESTIONNAIRE - PHQ9
1. LITTLE INTEREST OR PLEASURE IN DOING THINGS: 0
SUM OF ALL RESPONSES TO PHQ QUESTIONS 1-9: 0
2. FEELING DOWN, DEPRESSED OR HOPELESS: 0
SUM OF ALL RESPONSES TO PHQ9 QUESTIONS 1 & 2: 0
SUM OF ALL RESPONSES TO PHQ QUESTIONS 1-9: 0

## 2020-08-19 ASSESSMENT — ENCOUNTER SYMPTOMS
COLOR CHANGE: 1
RESPIRATORY NEGATIVE: 1

## 2020-08-19 NOTE — PROGRESS NOTES
[facility]    Subjective:      Patient ID: Nai Simmons is a 60 y.o. y.o. female. Patient is seen due to contusion right lower extremity hit on  door and is worried because she did same thing on left lower leg and had infection and had to have a PICC line it was a mess. [place]. She was hospitalized last year for 2-3 days. Does not want this to occur with the right leg. The area of the lateral bruise is tight. It hurts to touch area. Taking tylenol. Has pain in the right leg. It awoke if rolls over on the leg. Past Medical History:   Diagnosis Date    Adjustment disorder with mixed emotional features 02/29/2020    Anemia 02/29/2020    Anxiety 02/29/2020    Asthma 03/03/2009    Chronic airway obstruction (HCC)     Chronic idiopathic constipation     Chronic pain     Chronic sinusitis     Colon cancer (HCC)     Degeneration of lumbar intervertebral disc     Diastolic heart failure (HCC)     GERD (gastroesophageal reflux disease) 11/03/2006    Herpes simplex     Hyperlipidemia 12/18/2006    Hypertension     Iron deficiency anemia 02/29/2020    LAYTON (obstructive sleep apnea) 12/06/2017    Osteoarthritis     Sciatica     Thrombosed hemorrhoids        History reviewed. No pertinent surgical history.     Family History   Problem Relation Age of Onset    Heart Disease Mother     High Blood Pressure Mother     COPD Father     Heart Disease Father     High Blood Pressure Father     Cancer Father         lung       Allergies   Allergen Reactions    Amitriptyline     Amoxicillin-Pot Clavulanate     Aspirin     Capsaicin-Menthol-Methyl Sal     Eggs Or Egg-Derived Products     Morphine     Nitrofurantoin Monohyd Macro     Nsaids        Current Outpatient Medications   Medication Sig Dispense Refill    sulfamethoxazole-trimethoprim (BACTRIM DS;SEPTRA DS) 800-160 MG per tablet Take 1 tablet by mouth 2 times daily for 7 days 14 tablet 0    furosemide (LASIX) 40 MG tablet Take 40 mg by mouth daily      baclofen (LIORESAL) 20 MG tablet Take 1 tablet by mouth 4 times daily 60 tablet 2    buPROPion (WELLBUTRIN XL) 150 MG extended release tablet Take 150 mg by mouth every morning      ferrous sulfate (FE TABS 325) 325 (65 Fe) MG EC tablet Take 1 tablet by mouth 2 times daily 60 tablet 5    albuterol sulfate HFA (VENTOLIN HFA) 108 (90 Base) MCG/ACT inhaler Inhale 2 puffs into the lungs every 6 hours as needed for Wheezing       albuterol (PROVENTIL) (2.5 MG/3ML) 0.083% nebulizer solution Take 2.5 mg by nebulization 3 times daily      ALPRAZolam (XANAX) 0.25 MG tablet Take 0.25 mg by mouth as needed for Sleep.  colestipol (COLESTID) 1 g tablet Take 1 g by mouth 2 times daily Take 2 tablets by mouth two times a day      fluticasone-salmeterol (ADVAIR DISKUS) 100-50 MCG/DOSE diskus inhaler Inhale 1 puff into the lungs every 12 hours      lisinopril (PRINIVIL;ZESTRIL) 10 MG tablet Take 10 mg by mouth daily      loratadine (CLARITIN) 10 MG capsule Take 10 mg by mouth daily      omeprazole (PRILOSEC) 40 MG delayed release capsule Take 40 mg by mouth daily      potassium chloride (MICRO-K) 10 MEQ extended release capsule Take 10 mEq by mouth daily      azelastine (ASTELIN) 0.1 % nasal spray 1 spray by Nasal route 2 times daily Use in each nostril as directed      fluticasone (FLONASE) 50 MCG/ACT nasal spray 2 sprays by Each Nostril route daily       No current facility-administered medications for this visit. Review of Systems   Constitutional: Positive for activity change. Negative for appetite change, chills, fatigue and fever. HENT: Negative. Respiratory: Negative. Cardiovascular: Negative. Skin: Positive for color change and wound. Neurological:        The right lower leg burns and has since after hit the  door. Psychiatric/Behavioral: Positive for sleep disturbance.          Objective:      BP 120/74 (Site: Left Upper Arm, Position: Sitting, Cuff Size: Large Adult)   Pulse 74   Temp 98.2 °F (36.8 °C) (Tympanic)   Wt 234 lb (106.1 kg)   SpO2 98%   BMI 40.17 kg/m²     Physical Exam  Vitals signs and nursing note reviewed. Constitutional:       General: She is not in acute distress. Appearance: Normal appearance. She is obese. She is not ill-appearing. HENT:      Head: Normocephalic and atraumatic. Right Ear: External ear normal.      Left Ear: External ear normal.      Nose: Nose normal. No rhinorrhea. Eyes:      General: No scleral icterus. Cardiovascular:      Pulses: Normal pulses. Pulmonary:      Effort: Pulmonary effort is normal.   Musculoskeletal:         General: Swelling, tenderness and signs of injury present. Comments: She has a circular bruise in the right distal lower extremity laterally. Mild swelling of the lower extremity noted. Feels tight negative calf pain negative Homans. No ankle pain but some mild bruising in the ankle as well mild swelling in the ankle to. The right lateral lower extremity is tender to palpation. Slightly warm. Skin:     General: Skin is warm and dry. Findings: Bruising present. No rash. Neurological:      General: No focal deficit present. Mental Status: She is alert and oriented to person, place, and time. Sensory: No sensory deficit. Gait: Gait abnormal.   Psychiatric:         Mood and Affect: Mood normal.         Behavior: Behavior normal.         Thought Content: Thought content normal.         Judgment: Judgment normal.     Xr Tibia Fibula Right (2 Views)    Result Date: 8/19/2020  EXAMINATION: XRAY VIEWS OF THE RIGHT TIBIA AND FIBULA 8/19/2020 4:09 pm COMPARISON: None.  HISTORY: ORDERING SYSTEM PROVIDED HISTORY: Pain in right leg TECHNOLOGIST PROVIDED HISTORY: hit right lower leg 8/16/2020 Reason for Exam: Pt hit her lateral lower leg 3 days ago; [place] area of redness and bruising; painful to touch

## 2020-08-27 ENCOUNTER — TELEPHONE (OUTPATIENT)
Dept: FAMILY MEDICINE CLINIC | Age: 65
End: 2020-08-27

## 2020-08-27 NOTE — TELEPHONE ENCOUNTER
Pt started on Bactrim on 8/19 for contusion of right lower leg. Pt has completed ATB, but is still having issues, wanted to know if she could get an extended dose?

## 2020-08-28 RX ORDER — SULFAMETHOXAZOLE AND TRIMETHOPRIM 800; 160 MG/1; MG/1
1 TABLET ORAL 2 TIMES DAILY
Qty: 14 TABLET | Refills: 0 | Status: SHIPPED | OUTPATIENT
Start: 2020-08-28 | End: 2020-09-04

## 2020-09-04 ENCOUNTER — OFFICE VISIT (OUTPATIENT)
Dept: ORTHOPEDIC SURGERY | Age: 65
End: 2020-09-04
Payer: COMMERCIAL

## 2020-09-04 VITALS
DIASTOLIC BLOOD PRESSURE: 60 MMHG | HEART RATE: 70 BPM | SYSTOLIC BLOOD PRESSURE: 104 MMHG | BODY MASS INDEX: 41.15 KG/M2 | HEIGHT: 64 IN | WEIGHT: 241 LBS

## 2020-09-04 PROCEDURE — 20610 DRAIN/INJ JOINT/BURSA W/O US: CPT | Performed by: PHYSICIAN ASSISTANT

## 2020-09-04 PROCEDURE — 99214 OFFICE O/P EST MOD 30 MIN: CPT | Performed by: PHYSICIAN ASSISTANT

## 2020-09-04 RX ORDER — LIDOCAINE HYDROCHLORIDE 10 MG/ML
2 INJECTION, SOLUTION INFILTRATION; PERINEURAL ONCE
Status: COMPLETED | OUTPATIENT
Start: 2020-09-04 | End: 2020-09-04

## 2020-09-04 RX ORDER — METHYLPREDNISOLONE ACETATE 40 MG/ML
80 INJECTION, SUSPENSION INTRA-ARTICULAR; INTRALESIONAL; INTRAMUSCULAR; SOFT TISSUE ONCE
Status: COMPLETED | OUTPATIENT
Start: 2020-09-04 | End: 2020-09-04

## 2020-09-04 RX ORDER — BUPIVACAINE HYDROCHLORIDE 5 MG/ML
2 INJECTION, SOLUTION PERINEURAL ONCE
Status: COMPLETED | OUTPATIENT
Start: 2020-09-04 | End: 2020-09-04

## 2020-09-04 RX ADMIN — METHYLPREDNISOLONE ACETATE 80 MG: 40 INJECTION, SUSPENSION INTRA-ARTICULAR; INTRALESIONAL; INTRAMUSCULAR; SOFT TISSUE at 14:27

## 2020-09-04 RX ADMIN — LIDOCAINE HYDROCHLORIDE 2 ML: 10 INJECTION, SOLUTION INFILTRATION; PERINEURAL at 14:26

## 2020-09-04 RX ADMIN — BUPIVACAINE HYDROCHLORIDE 10 MG: 5 INJECTION, SOLUTION PERINEURAL at 14:25

## 2020-09-04 NOTE — PROGRESS NOTES
Orthopedic Office Note  [facility] [address], [address]  [facility] [address] [phone]      CHIEF COMPLAINT:    Chief Complaint   Patient presents with    Hip Pain     rech left hip        HISTORY OF PRESENT ILLNESS:      The patient is a 60 y.o. female  who presents today for recheck of her hips and knee. She states that her knee is now improved and is no longer bothersome. She states that the right hip after the trochanteric bursa injection has completely resolved. Unfortunately she states that the left hip is continuing to be bothersome and is worsening. The patient states that the injection was helpful for approximately 3 weeks and it was complete relief until it wore off. The patient states that she has had continued lateral sided left hip pain that is worse with direct pressure and laying on that side. She states that it is a sharp as well as achy pain. She denies paresthesias but states that she does have a history of low back pain and a bulging disc. She states that her back is occasionally achy and sore. She has been told she should not take anti-inflammatories but did take a few days of Aleve with significant improvement. Unfortunately she is unable to do this any longer per her PCP.     Past Medical History:    Past Medical History:   Diagnosis Date    Adjustment disorder with mixed emotional features 02/29/2020    Anemia 02/29/2020    Anxiety 02/29/2020    Asthma 03/03/2009    Chronic airway obstruction (HCC)     Chronic idiopathic constipation     Chronic pain     Chronic sinusitis     Colon cancer (HCC)     Degeneration of lumbar intervertebral disc     Diastolic heart failure (HCC)     GERD (gastroesophageal reflux disease) 11/03/2006    Herpes simplex     Hyperlipidemia 12/18/2006    Hypertension     Iron deficiency anemia 02/29/2020    LAYTON (obstructive sleep apnea) 12/06/2017  Osteoarthritis     Sciatica     Thrombosed hemorrhoids        Past Surgical History:    History reviewed. No pertinent surgical history. Medications Prior to Admission:   Current Outpatient Medications   Medication Sig Dispense Refill    sulfamethoxazole-trimethoprim (BACTRIM DS;SEPTRA DS) 800-160 MG per tablet Take 1 tablet by mouth 2 times daily for 7 days 14 tablet 0    furosemide (LASIX) 40 MG tablet Take 40 mg by mouth daily      baclofen (LIORESAL) 20 MG tablet Take 1 tablet by mouth 4 times daily 60 tablet 2    buPROPion (WELLBUTRIN XL) 150 MG extended release tablet Take 150 mg by mouth every morning      ferrous sulfate (FE TABS 325) 325 (65 Fe) MG EC tablet Take 1 tablet by mouth 2 times daily 60 tablet 5    albuterol sulfate HFA (VENTOLIN HFA) 108 (90 Base) MCG/ACT inhaler Inhale 2 puffs into the lungs every 6 hours as needed for Wheezing       albuterol (PROVENTIL) (2.5 MG/3ML) 0.083% nebulizer solution Take 2.5 mg by nebulization 3 times daily      ALPRAZolam (XANAX) 0.25 MG tablet Take 0.25 mg by mouth as needed for Sleep.  colestipol (COLESTID) 1 g tablet Take 1 g by mouth 2 times daily Take 2 tablets by mouth two times a day      fluticasone-salmeterol (ADVAIR DISKUS) 100-50 MCG/DOSE diskus inhaler Inhale 1 puff into the lungs every 12 hours      lisinopril (PRINIVIL;ZESTRIL) 10 MG tablet Take 10 mg by mouth daily      loratadine (CLARITIN) 10 MG capsule Take 10 mg by mouth daily      omeprazole (PRILOSEC) 40 MG delayed release capsule Take 40 mg by mouth daily      potassium chloride (MICRO-K) 10 MEQ extended release capsule Take 10 mEq by mouth daily      azelastine (ASTELIN) 0.1 % nasal spray 1 spray by Nasal route 2 times daily Use in each nostril as directed      fluticasone (FLONASE) 50 MCG/ACT nasal spray 2 sprays by Each Nostril route daily       No current facility-administered medications for this visit. Allergies:  Amitriptyline;  Amoxicillin-pot clavulanate; Aspirin; Capsaicin-menthol-methyl sal; Eggs or egg-derived products; Morphine; Nitrofurantoin monohyd macro; and Nsaids    Social History:   Social History     Tobacco Use   Smoking Status Former Smoker    Packs/day: 1.00    Types: Cigarettes    Start date: 7/10/1970   [place] [employer] Last attempt to quit: 7/10/1984    Years since quittin.1   Smokeless Tobacco Never Used   Tobacco Comment    quit cold turkey     Social History     Substance and Sexual Activity   Alcohol Use Yes    Frequency: Monthly or less    Drinks per session: 1 or 2    Binge frequency: Never     Social History     Substance and Sexual Activity   Drug Use Never       Family History:  Family History   Problem Relation Age of Onset    Heart Disease Mother     High Blood Pressure Mother     COPD Father     Heart Disease Father     High Blood Pressure Father     Cancer Father         lung         REVIEW OF SYSTEMS:  Please see the ROS form attached to today's encounter. I have reviewed it with the patient during the visit. All other systems were reviewed and are negative. PHYSICAL EXAM:  Patient is a age-appropriate 20-year-old female, alert and oriented ×3 and in no acute distress. Well-dressed and well-groomed and stands with normal body position. In a calm mood. Patient is normocephalic and exhibits nonlabored respirations. Clear conjunctiva and pupils that are reactive. On examination, she has mild discomfort with lumbar range of motion. She is nontender palpation along the lumbar spine. Mild paraspinal tenderness. No tenderness to the sciatic notch. Straight leg raise is negative. She has good strength in the lower extremities. Deep tendon reflexes are intact and symmetric. Intact sensation to light touch. Normal muscle tone and bulk. No lymphadenopathy. No open wounds, masses, or skin lesions. Skin is intact. She has full hip range of motion bilaterally without instability or pain. Logroll test is negative. She is tender to palpation over the left trochanteric bursa. She is also tender along the IT band. There is no erythema, ecchymosis, or warmth. She has palpable pedal pulses and brisk capillary refill. The patient ambulates with a nonantalgic gait. She has full knee range of motion. She is tender to palpation along the joint lines. No ligamentous instability. Lucy's maneuver is negative. No effusion. Radiology:  No new radiographs today. Previous radiographs were reviewed and only revealed mild hip arthrosis. No evidence of fracture, AVN, or collapse. ASSESSMENT/PLAN:  1. Trochanteric bursitis of both hips    2. Left hip pain    3. Lumbar degenerative disc disease    4. Primary osteoarthritis of right knee        We have discussed continued treatment with the patient for greater than 25 min. She had very significant relief with the previous corticosteroid injection but unfortunately did not last.  She would like to try repeat injection. After discussion of risks and benefits, injection was performed into the left trochanteric bursa using a combination of 2cc of Depo-Medrol and local anesthetic. The patient tolerated the injection well. We will follow-up in 1 month for recheck. We did discuss further evaluation with MRI if symptoms persist.    No orders of the defined types were placed in this encounter.        Devan Rodriguez PA-C

## 2020-09-28 ENCOUNTER — HOSPITAL ENCOUNTER (OUTPATIENT)
Dept: LAB | Age: 65
Discharge: HOME | End: 2020-09-28
Payer: COMMERCIAL

## 2020-09-28 LAB
ABSOLUTE EOS #: 0.16 K/UL (ref 0–0.44)
ABSOLUTE IMMATURE GRANULOCYTE: <0.03 K/UL (ref 0–0.3)
ABSOLUTE LYMPH #: 1.71 K/UL (ref 1.1–3.7)
ABSOLUTE MONO #: 0.59 K/UL (ref 0.1–1.2)
ALBUMIN SERPL-MCNC: 4 G/DL (ref 3.5–5.2)
ALBUMIN/GLOBULIN RATIO: 1.5 (ref 1–2.5)
ALP BLD-CCNC: 113 U/L (ref 35–104)
ALT SERPL-CCNC: 30 U/L (ref 5–33)
ANION GAP SERPL CALCULATED.3IONS-SCNC: 9 MMOL/L (ref 9–17)
AST SERPL-CCNC: 16 U/L
BASOPHILS # BLD: 1 % (ref 0–2)
BASOPHILS ABSOLUTE: 0.05 K/UL (ref 0–0.2)
BILIRUB SERPL-MCNC: 1.01 MG/DL (ref 0.3–1.2)
BUN BLDV-MCNC: 11 MG/DL (ref 8–23)
BUN/CREAT BLD: 22 (ref 9–20)
CALCIUM SERPL-MCNC: 9.4 MG/DL (ref 8.6–10.4)
CHLORIDE BLD-SCNC: 106 MMOL/L (ref 98–107)
CHOLESTEROL/HDL RATIO: 3
CHOLESTEROL: 187 MG/DL
CO2: 25 MMOL/L (ref 20–31)
CREAT SERPL-MCNC: 0.51 MG/DL (ref 0.5–0.9)
DIFFERENTIAL TYPE: ABNORMAL
EOSINOPHILS RELATIVE PERCENT: 2 % (ref 1–4)
ESTIMATED AVERAGE GLUCOSE: 114 MG/DL
FERRITIN: 368 UG/L (ref 13–150)
GFR AFRICAN AMERICAN: >60 ML/MIN
GFR NON-AFRICAN AMERICAN: >60 ML/MIN
GFR SERPL CREATININE-BSD FRML MDRD: ABNORMAL ML/MIN/{1.73_M2}
GFR SERPL CREATININE-BSD FRML MDRD: ABNORMAL ML/MIN/{1.73_M2}
GLUCOSE BLD-MCNC: 108 MG/DL (ref 70–99)
HBA1C MFR BLD: 5.6 % (ref 4.8–5.9)
HCT VFR BLD CALC: 41.9 % (ref 36.3–47.1)
HDLC SERPL-MCNC: 63 MG/DL
HEMOGLOBIN: 13.4 G/DL (ref 11.9–15.1)
IMMATURE GRANULOCYTES: 0 %
IRON SATURATION: 43 % (ref 20–55)
IRON: 120 UG/DL (ref 37–145)
LDL CHOLESTEROL: 103 MG/DL (ref 0–130)
LYMPHOCYTES # BLD: 24 % (ref 24–43)
MCH RBC QN AUTO: 33.1 PG (ref 25.2–33.5)
MCHC RBC AUTO-ENTMCNC: 32 G/DL (ref 25.2–33.5)
MCV RBC AUTO: 103.5 FL (ref 82.6–102.9)
MONOCYTES # BLD: 8 % (ref 3–12)
NRBC AUTOMATED: 0 PER 100 WBC
PDW BLD-RTO: 14.3 % (ref 11.8–14.4)
PLATELET # BLD: 296 K/UL (ref 138–453)
PLATELET ESTIMATE: ABNORMAL
PMV BLD AUTO: 9.2 FL (ref 8.1–13.5)
POTASSIUM SERPL-SCNC: 4.1 MMOL/L (ref 3.7–5.3)
RBC # BLD: 4.05 M/UL (ref 3.95–5.11)
RBC # BLD: ABNORMAL 10*6/UL
SEG NEUTROPHILS: 65 % (ref 36–65)
SEGMENTED NEUTROPHILS ABSOLUTE COUNT: 4.7 K/UL (ref 1.5–8.1)
SODIUM BLD-SCNC: 140 MMOL/L (ref 135–144)
TOTAL IRON BINDING CAPACITY: 276 UG/DL (ref 250–450)
TOTAL PROTEIN: 6.6 G/DL (ref 6.4–8.3)
TRIGL SERPL-MCNC: 107 MG/DL
UNSATURATED IRON BINDING CAPACITY: 156 UG/DL (ref 112–347)
VLDLC SERPL CALC-MCNC: NORMAL MG/DL (ref 1–30)
WBC # BLD: 7.2 K/UL (ref 3.5–11.3)
WBC # BLD: ABNORMAL 10*3/UL

## 2020-09-28 PROCEDURE — 83036 HEMOGLOBIN GLYCOSYLATED A1C: CPT

## 2020-09-28 PROCEDURE — 83540 ASSAY OF IRON: CPT

## 2020-09-28 PROCEDURE — 36415 COLL VENOUS BLD VENIPUNCTURE: CPT

## 2020-09-28 PROCEDURE — 80061 LIPID PANEL: CPT

## 2020-09-28 PROCEDURE — 85025 COMPLETE CBC W/AUTO DIFF WBC: CPT

## 2020-09-28 PROCEDURE — 82728 ASSAY OF FERRITIN: CPT

## 2020-09-28 PROCEDURE — 83550 IRON BINDING TEST: CPT

## 2020-09-28 PROCEDURE — 80053 COMPREHEN METABOLIC PANEL: CPT

## 2020-10-05 ENCOUNTER — OFFICE VISIT (OUTPATIENT)
Dept: ONCOLOGY | Age: 65
End: 2020-10-05
Payer: COMMERCIAL

## 2020-10-05 VITALS
SYSTOLIC BLOOD PRESSURE: 124 MMHG | WEIGHT: 229 LBS | HEART RATE: 80 BPM | HEIGHT: 64 IN | DIASTOLIC BLOOD PRESSURE: 80 MMHG | BODY MASS INDEX: 39.09 KG/M2 | OXYGEN SATURATION: 96 % | TEMPERATURE: 97.1 F | RESPIRATION RATE: 16 BRPM

## 2020-10-05 PROCEDURE — 99214 OFFICE O/P EST MOD 30 MIN: CPT | Performed by: INTERNAL MEDICINE

## 2020-10-05 NOTE — PROGRESS NOTES
intervertebral disc     Diastolic heart failure (HCC)     GERD (gastroesophageal reflux disease) 11/03/2006    Herpes simplex     Hyperlipidemia 12/18/2006    Hypertension     Iron deficiency anemia 02/29/2020    LAYTON (obstructive sleep apnea) 12/06/2017    Osteoarthritis     Sciatica     Thrombosed hemorrhoids      History reviewed. No pertinent surgical history. Allergies   Allergen Reactions    Amitriptyline     Amoxicillin-Pot Clavulanate     Aspirin     Capsaicin-Menthol-Methyl Sal     Eggs Or Egg-Derived Products     Morphine     Nitrofurantoin Monohyd Macro     Nsaids        Current Outpatient Medications   Medication Sig Dispense Refill    furosemide (LASIX) 40 MG tablet Take 40 mg by mouth daily      baclofen (LIORESAL) 20 MG tablet Take 1 tablet by mouth 4 times daily 60 tablet 2    ferrous sulfate (FE TABS 325) 325 (65 Fe) MG EC tablet Take 1 tablet by mouth 2 times daily 60 tablet 5    albuterol sulfate HFA (VENTOLIN HFA) 108 (90 Base) MCG/ACT inhaler Inhale 2 puffs into the lungs every 6 hours as needed for Wheezing       albuterol (PROVENTIL) (2.5 MG/3ML) 0.083% nebulizer solution Take 2.5 mg by nebulization 3 times daily      ALPRAZolam (XANAX) 0.25 MG tablet Take 0.25 mg by mouth as needed for Sleep.       colestipol (COLESTID) 1 g tablet Take 1 g by mouth 2 times daily Take 2 tablets by mouth two times a day      fluticasone-salmeterol (ADVAIR DISKUS) 100-50 MCG/DOSE diskus inhaler Inhale 1 puff into the lungs every 12 hours      lisinopril (PRINIVIL;ZESTRIL) 10 MG tablet Take 10 mg by mouth daily      loratadine (CLARITIN) 10 MG capsule Take 10 mg by mouth daily      potassium chloride (MICRO-K) 10 MEQ extended release capsule Take 10 mEq by mouth daily      azelastine (ASTELIN) 0.1 % nasal spray 1 spray by Nasal route 2 times daily Use in each nostril as directed      fluticasone (FLONASE) 50 MCG/ACT nasal spray 2 sprays by Each Nostril route daily      buPROPion (WELLBUTRIN XL) 150 MG extended release tablet Take 150 mg by mouth every morning      omeprazole (PRILOSEC) 40 MG delayed release capsule Take 40 mg by mouth daily       No current facility-administered medications for this visit.         Social History     Socioeconomic History    Marital status:      Spouse name: Not on file    Number of children: Not on file    Years of education: Not on file    Highest education level: Not on file   Occupational History    Not on file   Social Needs    Financial resource strain: Not on file    Food insecurity     Worry: Not on file     Inability: Not on file    Transportation needs     Medical: Not on file     Non-medical: Not on file   Tobacco Use    Smoking status: Former Smoker     Packs/day: 1.00     Types: Cigarettes     Start date: 7/10/1970     Last attempt to quit: 7/10/1984     Years since quittin.2    Smokeless tobacco: Never Used    Tobacco comment: quit cold turkey   Substance and Sexual Activity    Alcohol use: Yes     Frequency: Monthly or less     Drinks per session: 1 or 2     Binge frequency: Never    Drug use: Never    Sexual activity: Not on file   Lifestyle    Physical activity     Days per week: Not on file     Minutes per session: Not on file    Stress: Not on file   Relationships    Social connections     Talks on phone: Not on file     Gets together: Not on file     Attends Hinduism service: Not on file     Active member of club or organization: Not on file     Attends meetings of clubs or organizations: Not on file     Relationship status: Not on file    Intimate partner violence     Fear of current or ex partner: Not on file     Emotionally abused: Not on file     Physically abused: Not on file     Forced sexual activity: Not on file   Other Topics Concern    Not on file   Social History Narrative    Not on file     Family History   Problem Relation Age of Onset    Heart Disease Mother     High Blood Pressure Mother  COPD Father     Heart Disease Father     High Blood Pressure Father     Cancer Father         lung      REVIEW OF SYSTEM:   Constitutional: No fever or chills. No night sweats, no weight loss   Eyes: No eye discharge, double vision, or eye pain   HEENT: negative for sore mouth, sore throat, hoarseness and voice change   Respiratory: negative for cough , sputum, dyspnea, wheezing, hemoptysis, chest pain   Cardiovascular: negative for chest pain, dyspnea, palpitations, orthopnea, PND   Gastrointestinal: negative for nausea, vomiting, diarrhea, constipation, abdominal pain, Dysphagia, hematemesis and hematochezia   Genitourinary: negative for frequency, dysuria, nocturia, urinary incontinence, and hematuria   Integument: negative for rash, skin lesions, bruises.    Hematologic/Lymphatic: negative for easy bruising, bleeding, lymphadenopathy, petechiae and swelling/edema   Endocrine: negative for heat or cold intolerance, tremor, weight changes, change in bowel habits and hair loss   Musculoskeletal: negative for myalgias, arthralgias, pain, joint swelling,and bone pain   Neurological: negative for headaches, dizziness, seizures, weakness, numbness  OBJECTIVE:        Vitals:    10/05/20 1003   BP: 124/80   Pulse: 80   Resp: 16   Temp: 97.1 °F (36.2 °C)   SpO2: 96%     PHYSICAL EXAMINATION:      LABORATORY DATA:     Lab Results   Component Value Date    WBC 7.2 09/28/2020    HGB 13.4 09/28/2020    HCT 41.9 09/28/2020    .5 (H) 09/28/2020     09/28/2020    LYMPHOPCT 24 09/28/2020    RBC 4.05 09/28/2020    MCH 33.1 09/28/2020    MCHC 32.0 09/28/2020    RDW 14.3 09/28/2020    MONOPCT 8 09/28/2020    BASOPCT 1 09/28/2020    NEUTROABS 4.70 09/28/2020    LYMPHSABS 1.71 09/28/2020    MONOSABS 0.59 09/28/2020    EOSABS 0.16 09/28/2020    BASOSABS 0.05 09/28/2020         Chemistry        Component Value Date/Time     09/28/2020 1126    K 4.1 09/28/2020 1126     09/28/2020 1126    CO2 25 09/28/2020 1126    BUN 11 09/28/2020 1126    CREATININE 0.51 09/28/2020 1126        Component Value Date/Time    CALCIUM 9.4 09/28/2020 1126    ALKPHOS 113 (H) 09/28/2020 1126    AST 16 09/28/2020 1126    ALT 30 09/28/2020 1126    BILITOT 1.01 09/28/2020 1126      Iron Profile (03/18/2020 11:58 AM EDT)  Iron Profile (03/18/2020 11:58 AM EDT)   Component Value Ref Range Performed At Pathologist Signature   Iron 15 (L) 39 - 145 ug/dL [facility]     TIBC 366 260 - 445 ug/dL [facility]     UIBC 351 110 - 370 ug/dL [facility]     % Sat 4 (L) 20 - 55 % [facility]       Iron Profile (03/18/2020 11:58 AM EDT)   Specimen       PATHOLOGY DATA:   Reviewed   IMAGING DATA:    Reviewed  ASSESSMENT:    Dina Cevallos is a 63-year old female with chronic anemia and most likely secondary to iron deficiency. She has history of iron intolerance therefore she has been receiving iron infusions in the past with her primary care physician. She has had GI evaluation in 2018 as reported by patient and was negative. She never had a video capsule endoscopy which would be my next recommendation. She had negative stool for occult blood checked recently. I reviewed the recent lab work which showed no evidence of hemolysis. Her peripheral blood smear was unremarkable. Continue oral iron supplementation at this point. I discussed the risk benefit and she is agreeable. PLAN:   I reviewed the lab work with patient  Her hemoglobin and iron now  Will plan to repeat iron studies and CBC in 6 months  Recommend continue iron pill  Return to clinic in 6 months or earlier if needed    Neil Blankenship MD  Hematologist/Medical Oncologist    This note is created with the assistance of a speech recognition program.  While intending to generate a document that actually reflects the content of the visit, the document can still have some errors including those of syntax and sound a like substitutions which may

## 2020-10-09 ENCOUNTER — OFFICE VISIT (OUTPATIENT)
Dept: ORTHOPEDIC SURGERY | Age: 65
End: 2020-10-09
Payer: COMMERCIAL

## 2020-10-09 VITALS
SYSTOLIC BLOOD PRESSURE: 140 MMHG | HEIGHT: 64 IN | BODY MASS INDEX: 41.15 KG/M2 | HEART RATE: 72 BPM | DIASTOLIC BLOOD PRESSURE: 84 MMHG | WEIGHT: 241 LBS

## 2020-10-09 PROCEDURE — 99214 OFFICE O/P EST MOD 30 MIN: CPT | Performed by: PHYSICIAN ASSISTANT

## 2020-10-09 NOTE — PROGRESS NOTES
Thrombosed hemorrhoids        Past Surgical History:    History reviewed. No pertinent surgical history. Medications Prior to Admission:   Current Outpatient Medications   Medication Sig Dispense Refill    furosemide (LASIX) 40 MG tablet Take 40 mg by mouth daily      baclofen (LIORESAL) 20 MG tablet Take 1 tablet by mouth 4 times daily 60 tablet 2    buPROPion (WELLBUTRIN XL) 150 MG extended release tablet Take 150 mg by mouth every morning      ferrous sulfate (FE TABS 325) 325 (65 Fe) MG EC tablet Take 1 tablet by mouth 2 times daily 60 tablet 5    albuterol sulfate HFA (VENTOLIN HFA) 108 (90 Base) MCG/ACT inhaler Inhale 2 puffs into the lungs every 6 hours as needed for Wheezing       albuterol (PROVENTIL) (2.5 MG/3ML) 0.083% nebulizer solution Take 2.5 mg by nebulization 3 times daily      ALPRAZolam (XANAX) 0.25 MG tablet Take 0.25 mg by mouth as needed for Sleep.  colestipol (COLESTID) 1 g tablet Take 1 g by mouth 2 times daily Take 2 tablets by mouth two times a day      fluticasone-salmeterol (ADVAIR DISKUS) 100-50 MCG/DOSE diskus inhaler Inhale 1 puff into the lungs every 12 hours      lisinopril (PRINIVIL;ZESTRIL) 10 MG tablet Take 10 mg by mouth daily      loratadine (CLARITIN) 10 MG capsule Take 10 mg by mouth daily      omeprazole (PRILOSEC) 40 MG delayed release capsule Take 40 mg by mouth daily      potassium chloride (MICRO-K) 10 MEQ extended release capsule Take 10 mEq by mouth daily      azelastine (ASTELIN) 0.1 % nasal spray 1 spray by Nasal route 2 times daily Use in each nostril as directed      fluticasone (FLONASE) 50 MCG/ACT nasal spray 2 sprays by Each Nostril route daily       No current facility-administered medications for this visit. Allergies:  Amitriptyline;  Amoxicillin-pot clavulanate; Aspirin; Capsaicin-menthol-methyl sal; Eggs or egg-derived products; Morphine; Nitrofurantoin monohyd macro; and Nsaids    Social History:   Social History     Tobacco Use Smoking Status Former Smoker    Packs/day: 1.00    Types: Cigarettes    Start date: 7/10/1970   [facility] Last attempt to quit: 7/10/1984    Years since quittin.2   Smokeless Tobacco Never Used   Tobacco Comment    quit cold turkey     Social History     Substance and Sexual Activity   Alcohol Use Yes    Frequency: Monthly or less    Drinks per session: 1 or 2    Binge frequency: Never     Social History     Substance and Sexual Activity   Drug Use Never       Family History:  Family History   Problem Relation Age of Onset    Heart Disease Mother     High Blood Pressure Mother     COPD Father     Heart Disease Father     High Blood Pressure Father     Cancer Father         lung         REVIEW OF SYSTEMS:  Please see the ROS form attached to today's encounter. I have reviewed it with the patient during the visit. All other systems were reviewed and are negative. PHYSICAL EXAM:  Patient is a age-appropriate 60-year-old female, alert and oriented ×3 and in no acute distress. Well-dressed and well-groomed and stands with normal body position. Normal balance. In a calm mood. Patient is normocephalic and exhibits nonlabored respirations. No head trauma. Clear conjunctiva and pupils that are reactive. On examination, the patient has intact range of motion through the left hip with only mild discomfort. Logroll test is negative. She is tender to palpation over the trochanteric bursa but also along the IT band. She is also tender through the sciatic notch. She has mild discomfort with lumbar range of motion. No instability. The patient is tender to palpation along the lower lumbar spine. No significant paraspinal tenderness. Straight leg raise is positive on the left negative on the right. She has 4+ strength with the left lower extremity and 5 out of 5 strength with the right lower extremity. Deep tendon reflexes are intact and symmetric. Ambulates with a nonantalgic gait.   Normal muscle tone and bulk. No lymphadenopathy. No open wounds, masses, or skin lesions. Skin is intact throughout the lower extremities. Palpable pulses distally. Brisk capillary refill. Radiology:  No new radiographs today. Previous radiographs were reviewed and show mild left hip DJD. ASSESSMENT/PLAN:  1. DDD (degenerative disc disease), lumbar    2. Lumbar radiculopathy    3. Trochanteric bursitis of left hip    4. Arthritis of left hip        We have discussed continued treatment with the patient. Unfortunately, her symptoms seem to be worsening. I have discussed that her pain is likely multifactorial with at this point the majority of her pain arising from her lumbar spine. We have discussed that it is likely that she has a disc protrusion causing radiculopathy. Unfortunately she has failed to improve with conservative treatment. I have recommended an MRI of her lumbar spine for further evaluation. We will see her back after the MRI has been completed for discussion of results and further treatment options. No orders of the defined types were placed in this encounter.        ESTHER Henson

## 2020-10-20 ENCOUNTER — HOSPITAL ENCOUNTER (OUTPATIENT)
Dept: GENERAL RADIOLOGY | Age: 65
Discharge: HOME | End: 2020-10-22
Payer: COMMERCIAL

## 2020-10-20 PROCEDURE — 72100 X-RAY EXAM L-S SPINE 2/3 VWS: CPT

## 2020-10-22 ENCOUNTER — HOSPITAL ENCOUNTER (OUTPATIENT)
Dept: MRI IMAGING | Age: 65
Discharge: HOME | End: 2020-10-24
Payer: COMMERCIAL

## 2020-10-22 PROCEDURE — 72148 MRI LUMBAR SPINE W/O DYE: CPT

## 2020-10-26 ENCOUNTER — HOSPITAL ENCOUNTER (OUTPATIENT)
Age: 65
Setting detail: SPECIMEN
Discharge: HOME | End: 2020-10-26
Payer: COMMERCIAL

## 2020-10-26 ENCOUNTER — OFFICE VISIT (OUTPATIENT)
Dept: PRIMARY CARE CLINIC | Age: 65
End: 2020-10-26
Payer: COMMERCIAL

## 2020-10-26 ENCOUNTER — OFFICE VISIT (OUTPATIENT)
Dept: ORTHOPEDIC SURGERY | Age: 65
End: 2020-10-26
Payer: COMMERCIAL

## 2020-10-26 VITALS
HEIGHT: 64 IN | TEMPERATURE: 97.7 F | HEART RATE: 75 BPM | OXYGEN SATURATION: 96 % | SYSTOLIC BLOOD PRESSURE: 132 MMHG | BODY MASS INDEX: 40.29 KG/M2 | WEIGHT: 236 LBS | DIASTOLIC BLOOD PRESSURE: 84 MMHG

## 2020-10-26 VITALS
DIASTOLIC BLOOD PRESSURE: 80 MMHG | WEIGHT: 241 LBS | SYSTOLIC BLOOD PRESSURE: 132 MMHG | BODY MASS INDEX: 41.15 KG/M2 | HEIGHT: 64 IN | HEART RATE: 79 BPM

## 2020-10-26 PROCEDURE — 99213 OFFICE O/P EST LOW 20 MIN: CPT | Performed by: ORTHOPAEDIC SURGERY

## 2020-10-26 PROCEDURE — 87070 CULTURE OTHR SPECIMN AEROBIC: CPT

## 2020-10-26 PROCEDURE — 87186 SC STD MICRODIL/AGAR DIL: CPT

## 2020-10-26 PROCEDURE — 87077 CULTURE AEROBIC IDENTIFY: CPT

## 2020-10-26 PROCEDURE — 99213 OFFICE O/P EST LOW 20 MIN: CPT | Performed by: NURSE PRACTITIONER

## 2020-10-26 PROCEDURE — 87205 SMEAR GRAM STAIN: CPT

## 2020-10-26 RX ORDER — CLINDAMYCIN HYDROCHLORIDE 300 MG/1
300 CAPSULE ORAL 4 TIMES DAILY
Qty: 40 CAPSULE | Refills: 0 | Status: SHIPPED | OUTPATIENT
Start: 2020-10-26 | End: 2020-11-05

## 2020-10-26 ASSESSMENT — ENCOUNTER SYMPTOMS
SHORTNESS OF BREATH: 0
NAUSEA: 0
VOMITING: 0
WHEEZING: 0

## 2020-10-26 ASSESSMENT — PATIENT HEALTH QUESTIONNAIRE - PHQ9
1. LITTLE INTEREST OR PLEASURE IN DOING THINGS: 0
SUM OF ALL RESPONSES TO PHQ QUESTIONS 1-9: 0
SUM OF ALL RESPONSES TO PHQ9 QUESTIONS 1 & 2: 0
2. FEELING DOWN, DEPRESSED OR HOPELESS: 0
SUM OF ALL RESPONSES TO PHQ QUESTIONS 1-9: 0
SUM OF ALL RESPONSES TO PHQ QUESTIONS 1-9: 0

## 2020-10-26 NOTE — PROGRESS NOTES
[address] [place] [address]  Dept: [phone]  Dept Fax: [phone]  Loc: [phone]        CHIEF COMPLAINT       Chief Complaint   Patient presents with    Skin Problem     right leg wound check hx of cellulitis       Nurses Notes reviewed and I agree except as noted in the HPI. HISTORY OF PRESENT ILLNESS   Kennedy Kaur is a 60 y.o. female who presents to [facility] today (10/26/2020) for evaluation of: Injured right lower leg in August on  at home. Was treated with bactrim x 2 rounds and improved. Pt noticed wound returning appx 5 weeks ago with small scab that has increased in size, become sunken, and develops redness and slight drainage. Pt states last night it started actively draining. Pt had previous infection in left lower leg that resulted in hospitalization and IV antibiotics    Wound Check   She was originally treated more than 14 days ago. Previous treatment included oral antibiotics. Maximum temperature: Denies fever. There has been bloody and colored discharge from the wound. There is new redness present. There is new swelling present. Pain course: aching, but not new. She has no difficulty moving the affected extremity or digit. REVIEW OF SYSTEMS     Review of Systems   Constitutional: Positive for fever. Negative for chills and fatigue. Respiratory: Negative for shortness of breath and wheezing. Cardiovascular: Negative for chest pain. Gastrointestinal: Negative for nausea and vomiting. Musculoskeletal: Negative for myalgias. Skin: Positive for wound (RLE). Neurological: Positive for light-headedness. Negative for weakness.        PAST MEDICAL HISTORY         Diagnosis Date    Adjustment disorder with mixed emotional features 02/29/2020    Anemia 02/29/2020    Anxiety 02/29/2020    Asthma 03/03/2009    Chronic airway obstruction (HCC)     Chronic idiopathic constipation     Chronic pain     Chronic sinusitis     Colon cancer (HCC)     Degeneration of lumbar intervertebral disc     Diastolic heart failure (HCC)     GERD (gastroesophageal reflux disease) 11/03/2006    Herpes simplex     Hyperlipidemia 12/18/2006    Hypertension     Iron deficiency anemia 02/29/2020    LAYTON (obstructive sleep apnea) 12/06/2017    Osteoarthritis     Sciatica     Thrombosed hemorrhoids        SURGICAL HISTORY     Patient  has no past surgical history on file. CURRENT MEDICATIONS       Outpatient Medications Prior to Visit   Medication Sig Dispense Refill    furosemide (LASIX) 40 MG tablet Take 40 mg by mouth daily      ferrous sulfate (FE TABS 325) 325 (65 Fe) MG EC tablet Take 1 tablet by mouth 2 times daily 60 tablet 5    albuterol sulfate HFA (VENTOLIN HFA) 108 (90 Base) MCG/ACT inhaler Inhale 2 puffs into the lungs every 6 hours as needed for Wheezing       albuterol (PROVENTIL) (2.5 MG/3ML) 0.083% nebulizer solution Take 2.5 mg by nebulization 3 times daily      ALPRAZolam (XANAX) 0.25 MG tablet Take 0.25 mg by mouth as needed for Sleep.       colestipol (COLESTID) 1 g tablet Take 1 g by mouth 2 times daily Take 2 tablets by mouth two times a day      fluticasone-salmeterol (ADVAIR DISKUS) 100-50 MCG/DOSE diskus inhaler Inhale 1 puff into the lungs every 12 hours      lisinopril (PRINIVIL;ZESTRIL) 10 MG tablet Take 10 mg by mouth daily      loratadine (CLARITIN) 10 MG capsule Take 10 mg by mouth daily      omeprazole (PRILOSEC) 40 MG delayed release capsule Take 40 mg by mouth daily      potassium chloride (MICRO-K) 10 MEQ extended release capsule Take 10 mEq by mouth daily      azelastine (ASTELIN) 0.1 % nasal spray 1 spray by Nasal route 2 times daily Use in each nostril as directed      fluticasone (FLONASE) 50 MCG/ACT nasal spray 2 sprays by Each Nostril route daily      baclofen (LIORESAL) 20 MG tablet Take 1 tablet by mouth 4 times daily (Patient not taking: Reported on 10/26/2020) 60 tablet 2    buPROPion (WELLBUTRIN XL) 150 MG extended release tablet Take 150 mg by mouth every morning       No facility-administered medications prior to visit. ALLERGIES     Patient is is allergic to amitriptyline; amoxicillin-pot clavulanate; aspirin; capsaicin-menthol-methyl sal; eggs or egg-derived products; morphine; nitrofurantoin monohyd macro; and nsaids. FAMILY HISTORY     Patient's family history includes COPD in her father; Cancer in her father; Heart Disease in her father and mother; High Blood Pressure in her father and mother. SOCIAL HISTORY     Patient  reports that she quit smoking about 36 years ago. Her smoking use included cigarettes. She started smoking about 50 years ago. She smoked 1.00 pack per day. She has never used smokeless tobacco. She reports current alcohol use. She reports that she does not use drugs. PHYSICAL EXAM     VITALS  BP: 132/84, Temp: 97.7 °F (36.5 °C), Pulse: 75,  , SpO2: 96 %  Physical Exam  Vitals signs reviewed. Constitutional:       General: She is not in acute distress. Appearance: She is obese. She is not ill-appearing or toxic-appearing. HENT:      Right Ear: Tympanic membrane and ear canal normal.      Left Ear: Tympanic membrane and ear canal normal.      Nose: Nose normal.      Mouth/Throat:      Mouth: Mucous membranes are moist.      Pharynx: Oropharynx is clear. No oropharyngeal exudate. Eyes:      Pupils: Pupils are equal, round, and reactive to light. Neck:      Musculoskeletal: Normal range of motion. Cardiovascular:      Rate and Rhythm: Normal rate. Heart sounds: Normal heart sounds. No murmur. Pulmonary:      Effort: Pulmonary effort is normal. No respiratory distress. Breath sounds: Normal breath sounds. Musculoskeletal: Normal range of motion. Right lower leg: Edema present.       Left lower leg: Edema (1+ pitting edema more treatment. Follow-up care is a key part of your treatment and safety. Be sure to make and go to all appointments, and call your doctor if you are having problems. It's also a good idea to know your test results and keep a list of the medicines you take. How can you care for yourself at home? · If your doctor told you how to care for your wound, follow your doctor's instructions. If you did not get instructions, follow this general advice:  ? You may cover the wound with a thin layer of petroleum jelly, such as Vaseline, and a nonstick bandage. ? Apply more petroleum jelly and replace the bandage as needed. · Keep the wound dry for the first 24 to 48 hours. After this, you can shower if your doctor okays it. Pat the wound dry. · Be safe with medicines. Read and follow all instructions on the label. ? If the doctor gave you a prescription medicine for pain, take it as prescribed. ? If you are not taking a prescription pain medicine, ask your doctor if you can take an over-the-counter medicine. · If your doctor prescribed antibiotics, take them as directed. Do not stop taking them just because you feel better. You need to take the full course of antibiotics. · If you have stitches, do not remove them on your own. Your doctor will tell you when to come back to have them removed. · If you have Steri-Strips, leave them on until they fall off. · If possible, prop up the injured area on a pillow anytime you sit or lie down during the next 3 days. Try to keep it above the level of your heart. This will help reduce swelling. When should you call for help? Call your doctor now or seek immediate medical care if:    · You have new pain, or the pain gets worse.     · The skin near the wound is cold or pale or changes color.     · You have tingling, weakness, or numbness near the wound.     · The wound starts to bleed, and blood soaks through the bandage.  Oozing small amounts of blood is normal.     · You have symptoms of infection, such as:  ? Increased pain, swelling, warmth, or redness. ? Red streaks leading from the wound. ? Pus draining from the wound. ? A fever. Watch closely for changes in your health, and be sure to contact your doctor if:    · You do not get better as expected. Where can you learn more? Go to https://chpepiceweb.[email or web address] org and sign in to your Memorandom account. Enter  in the [employer]sprey Sp[employer] Control box to learn more about \"Wound Check: Care Instructions. \"     If you do not have an account, please click on the \"Sign Up Now\" link. Current as of: June 26, 2019               Content Version: 12.6  © 3378-1516 [employer]. Care instructions adapted under license by [facility] ([facility]). If you have questions about a medical condition or this instruction, always ask your healthcare professional. [address] any warranty or liability for your use of this information.              Orders Placed This Encounter   Procedures    Culture, Wound     Right lateral lower leg wound     Standing Status:   Future     Standing Expiration Date:   11/26/2020   [facility]     Referral Priority:   Routine     Referral Type:   Eval and Treat     Referral Reason:   Specialty Services Required     Requested Specialty:   Wound Care     Number of Visits Requested:   1     Outpatient Encounter Medications as of 10/26/2020   Medication Sig Dispense Refill    clindamycin (CLEOCIN) 300 MG capsule Take 1 capsule by mouth 4 times daily for 10 days 40 capsule 0    furosemide (LASIX) 40 MG tablet Take 40 mg by mouth daily      ferrous sulfate (FE TABS 325) 325 (65 Fe) MG EC tablet Take 1 tablet by mouth 2 times daily 60 tablet 5    albuterol sulfate HFA (VENTOLIN HFA) 108 (90 Base) MCG/ACT inhaler Inhale 2 puffs into the lungs every 6 hours as needed for Wheezing       albuterol (PROVENTIL) (2.5 MG/3ML) 0.083% nebulizer solution Take 2.5 mg by nebulization 3 times daily      ALPRAZolam (XANAX) 0.25 MG tablet Take 0.25 mg by mouth as needed for Sleep.  colestipol (COLESTID) 1 g tablet Take 1 g by mouth 2 times daily Take 2 tablets by mouth two times a day      fluticasone-salmeterol (ADVAIR DISKUS) 100-50 MCG/DOSE diskus inhaler Inhale 1 puff into the lungs every 12 hours      lisinopril (PRINIVIL;ZESTRIL) 10 MG tablet Take 10 mg by mouth daily      loratadine (CLARITIN) 10 MG capsule Take 10 mg by mouth daily      omeprazole (PRILOSEC) 40 MG delayed release capsule Take 40 mg by mouth daily      potassium chloride (MICRO-K) 10 MEQ extended release capsule Take 10 mEq by mouth daily      azelastine (ASTELIN) 0.1 % nasal spray 1 spray by Nasal route 2 times daily Use in each nostril as directed      fluticasone (FLONASE) 50 MCG/ACT nasal spray 2 sprays by Each Nostril route daily      baclofen (LIORESAL) 20 MG tablet Take 1 tablet by mouth 4 times daily (Patient not taking: Reported on 10/26/2020) 60 tablet 2    buPROPion (WELLBUTRIN XL) 150 MG extended release tablet Take 150 mg by mouth every morning       No facility-administered encounter medications on file as of 10/26/2020. No follow-ups on file.                 Electronically signed by DIAMOND Jackson NP on 10/26/2020 at 10:58 AM

## 2020-10-26 NOTE — PROGRESS NOTES
TABS 325) 325 (65 Fe) MG EC tablet Take 1 tablet by mouth 2 times daily 60 tablet 5    albuterol sulfate HFA (VENTOLIN HFA) 108 (90 Base) MCG/ACT inhaler Inhale 2 puffs into the lungs every 6 hours as needed for Wheezing       albuterol (PROVENTIL) (2.5 MG/3ML) 0.083% nebulizer solution Take 2.5 mg by nebulization 3 times daily      ALPRAZolam (XANAX) 0.25 MG tablet Take 0.25 mg by mouth as needed for Sleep.  colestipol (COLESTID) 1 g tablet Take 1 g by mouth 2 times daily Take 2 tablets by mouth two times a day      fluticasone-salmeterol (ADVAIR DISKUS) 100-50 MCG/DOSE diskus inhaler Inhale 1 puff into the lungs every 12 hours      lisinopril (PRINIVIL;ZESTRIL) 10 MG tablet Take 10 mg by mouth daily      loratadine (CLARITIN) 10 MG capsule Take 10 mg by mouth daily      omeprazole (PRILOSEC) 40 MG delayed release capsule Take 40 mg by mouth daily      potassium chloride (MICRO-K) 10 MEQ extended release capsule Take 10 mEq by mouth daily      azelastine (ASTELIN) 0.1 % nasal spray 1 spray by Nasal route 2 times daily Use in each nostril as directed      fluticasone (FLONASE) 50 MCG/ACT nasal spray 2 sprays by Each Nostril route daily       No current facility-administered medications for this visit. Allergies:  Amitriptyline;  Amoxicillin-pot clavulanate; Aspirin; Capsaicin-menthol-methyl sal; Eggs or egg-derived products; Morphine; Nitrofurantoin monohyd macro; and Nsaids    Social History:   Social History     Tobacco Use   Smoking Status Former Smoker    Packs/day: 1.00    Types: Cigarettes    Start date: 7/10/1970   [facility] Last attempt to quit: 7/10/1984    Years since quittin.3   Smokeless Tobacco Never Used   Tobacco Comment    quit cold turkey     Social History     Substance and Sexual Activity   Alcohol Use Yes    Frequency: Monthly or less    Drinks per session: 1 or 2    Binge frequency: Never     Social History     Substance and Sexual Activity   Drug Use Never       Family History:  Family History   Problem Relation Age of Onset    Heart Disease Mother     High Blood Pressure Mother     COPD Father     Heart Disease Father     High Blood Pressure Father     Cancer Father         lung         REVIEW OF SYSTEMS:  Please see the ROS form attached to today's encounter. I have reviewed it with the patient during the visit. All other systems were reviewed and are negative. PHYSICAL EXAM:  On exam today she is alert and oriented x3. She is in no obvious distress. Her general appearance is within normal limits. She is tender over the greater trochanter and iliotibial band on the left side. Straight leg raise reproduces left leg pain. She has 5 out of 5 strength in the lower extremities. Sensation is intact in the lower extremities. She has good capillary refill with normal sensation. Deep tendon reflexes are absent. Her gait and balance are normal.    Radiology:  I reviewed her MRI report and images and agree with the findings of lumbar spinal stenosis with degenerative disc disease. ASSESSMENT/PLAN:  1. DDD (degenerative disc disease), lumbar    2. Lumbar radiculopathy    3. Trochanteric bursitis of left hip    4. Spinal stenosis of lumbar region with neurogenic claudication        I recommended a referral to pain management for consideration of an injection. She will follow-up with me as needed to repeat her trochanteric injections. No orders of the defined types were placed in this encounter.        Ashtyn Tadeo MD

## 2020-10-26 NOTE — PATIENT INSTRUCTIONS
Keep appt with wound clinic tomorrow morning. Start antibiotics today. Take lasix as scheduled and elevate your legs. Keep dressing clean and dry. Patient Education        Wound Check: Care Instructions  Your Care Instructions  People have wounds that need care for many reasons. You may have a cut that needs care after surgery. You may have a cut or puncture wound from an accident. Or you may have a wound because of a condition like diabetes. Whatever the cause of your wound, there are things you can do to care for it at home. Your doctor may also want you to come back for a wound check. The wound check lets the doctor know how your wound is healing and if you need more treatment. Follow-up care is a key part of your treatment and safety. Be sure to make and go to all appointments, and call your doctor if you are having problems. It's also a good idea to know your test results and keep a list of the medicines you take. How can you care for yourself at home? · If your doctor told you how to care for your wound, follow your doctor's instructions. If you did not get instructions, follow this general advice:  ? You may cover the wound with a thin layer of petroleum jelly, such as Vaseline, and a nonstick bandage. ? Apply more petroleum jelly and replace the bandage as needed. · Keep the wound dry for the first 24 to 48 hours. After this, you can shower if your doctor okays it. Pat the wound dry. · Be safe with medicines. Read and follow all instructions on the label. ? If the doctor gave you a prescription medicine for pain, take it as prescribed. ? If you are not taking a prescription pain medicine, ask your doctor if you can take an over-the-counter medicine. · If your doctor prescribed antibiotics, take them as directed. Do not stop taking them just because you feel better. You need to take the full course of antibiotics. · If you have stitches, do not remove them on your own.  Your doctor will tell you when to come back to have them removed. · If you have Steri-Strips, leave them on until they fall off. · If possible, prop up the injured area on a pillow anytime you sit or lie down during the next 3 days. Try to keep it above the level of your heart. This will help reduce swelling. When should you call for help? Call your doctor now or seek immediate medical care if:    · You have new pain, or the pain gets worse.     · The skin near the wound is cold or pale or changes color.     · You have tingling, weakness, or numbness near the wound.     · The wound starts to bleed, and blood soaks through the bandage. Oozing small amounts of blood is normal.     · You have symptoms of infection, such as:  ? Increased pain, swelling, warmth, or redness. ? Red streaks leading from the wound. ? Pus draining from the wound. ? A fever. Watch closely for changes in your health, and be sure to contact your doctor if:    · You do not get better as expected. Where can you learn more? Go to https://MedTest DX.Bloomfire. org and sign in to your Everlane account. Enter  in the TravelTipz.ru box to learn more about \"Wound Check: Care Instructions. \"     If you do not have an account, please click on the \"Sign Up Now\" link. Current as of: June 26, 2019               Content Version: 12.6  © 7665-8084 [employer]. Care instructions adapted under license by [facility] ([facility]). If you have questions about a medical condition or this instruction, always ask your healthcare professional. [place] [address] any warranty or liability for your use of this information.

## 2020-10-27 ENCOUNTER — HOSPITAL ENCOUNTER (OUTPATIENT)
Dept: WOUND CARE | Age: 65
Discharge: HOME | End: 2020-10-28
Payer: COMMERCIAL

## 2020-10-27 VITALS
BODY MASS INDEX: 39.09 KG/M2 | HEIGHT: 64 IN | SYSTOLIC BLOOD PRESSURE: 138 MMHG | WEIGHT: 229 LBS | DIASTOLIC BLOOD PRESSURE: 86 MMHG | RESPIRATION RATE: 20 BRPM | TEMPERATURE: 96.9 F | HEART RATE: 80 BPM

## 2020-10-27 PROBLEM — S81.801A WOUND OF RIGHT LEG: Status: ACTIVE | Noted: 2020-10-27

## 2020-10-27 PROCEDURE — 11042 DBRDMT SUBQ TIS 1ST 20SQCM/<: CPT | Performed by: SURGERY

## 2020-10-27 RX ORDER — LIDOCAINE HYDROCHLORIDE 20 MG/ML
JELLY TOPICAL ONCE
Status: CANCELLED | OUTPATIENT
Start: 2020-10-27

## 2020-10-27 RX ORDER — BETAMETHASONE DIPROPIONATE 0.05 %
OINTMENT (GRAM) TOPICAL ONCE
Status: CANCELLED | OUTPATIENT
Start: 2020-10-27

## 2020-10-27 RX ORDER — BACITRACIN, NEOMYCIN, POLYMYXIN B 400; 3.5; 5 [USP'U]/G; MG/G; [USP'U]/G
OINTMENT TOPICAL ONCE
Status: CANCELLED | OUTPATIENT
Start: 2020-10-27

## 2020-10-27 RX ORDER — LIDOCAINE 50 MG/G
OINTMENT TOPICAL ONCE
Status: CANCELLED | OUTPATIENT
Start: 2020-10-27

## 2020-10-27 RX ORDER — CLOBETASOL PROPIONATE 0.5 MG/G
OINTMENT TOPICAL ONCE
Status: CANCELLED | OUTPATIENT
Start: 2020-10-27

## 2020-10-27 RX ORDER — GENTAMICIN SULFATE 1 MG/G
OINTMENT TOPICAL ONCE
Status: CANCELLED | OUTPATIENT
Start: 2020-10-27

## 2020-10-27 RX ORDER — GINSENG 100 MG
CAPSULE ORAL ONCE
Status: CANCELLED | OUTPATIENT
Start: 2020-10-27

## 2020-10-27 RX ORDER — LIDOCAINE HYDROCHLORIDE 40 MG/ML
SOLUTION TOPICAL ONCE
Status: CANCELLED | OUTPATIENT
Start: 2020-10-27

## 2020-10-27 RX ORDER — BACITRACIN ZINC AND POLYMYXIN B SULFATE 500; 1000 [USP'U]/G; [USP'U]/G
OINTMENT TOPICAL ONCE
Status: CANCELLED | OUTPATIENT
Start: 2020-10-27

## 2020-10-27 NOTE — PROGRESS NOTES
[address]   Progress Note and Procedure Note      [facility]  MEDICAL RECORD NUMBER:  [id number]  AGE: 60 y.o. GENDER: female  : 1955  EPISODE DATE:  10/27/2020    Subjective:     Chief Complaint   Patient presents with    Wound Check     right lower leg         HISTORY of PRESENT ILLNESS HPI     Beba Rdz is a 60 y.o. female who presents today for wound/ulcer evaluation. Of the right lower leg. The traumatic wound that she had on the  on got a scab on it and she treated with Bactrim x2 rounds. It was draining and she was worried about estimating appointment with us. Ulcer Identification:  Ulcer Type: traumatic    Contributing Factors: venous stasis    Acute Wound: Abrasion and Contusion     PAST MEDICAL HISTORY        Diagnosis Date    Adjustment disorder with mixed emotional features 2020    Anemia 2020    Anxiety 2020    Asthma 2009    Chronic airway obstruction (HCC)     Chronic idiopathic constipation     Chronic pain     Chronic sinusitis     Colon cancer (HCC)     Degeneration of lumbar intervertebral disc     Diastolic heart failure (HCC)     GERD (gastroesophageal reflux disease) 2006    Herpes simplex     Hyperlipidemia 2006    Hypertension     Iron deficiency anemia 2020    LAYTON (obstructive sleep apnea) 2017    Osteoarthritis     Sciatica     Thrombosed hemorrhoids        PAST SURGICAL HISTORY    No past surgical history on file.     FAMILY HISTORY    Family History   Problem Relation Age of Onset    Heart Disease Mother     High Blood Pressure Mother     COPD Father     Heart Disease Father     High Blood Pressure Father     Cancer Father         lung       SOCIAL HISTORY    Social History     Tobacco Use    Smoking status: Former Smoker     Packs/day: 1.00     Types: Cigarettes     Start date: 7/10/1970     Last attempt to quit: 7/10/1984     Years since quitting: Pt in room 190 for pre y90.   36.3    Smokeless tobacco: Never Used    Tobacco comment: quit cold turkey   Substance Use Topics    Alcohol use: Yes     Frequency: Monthly or less     Drinks per session: 1 or 2     Binge frequency: Never    Drug use: Never       ALLERGIES    Allergies   Allergen Reactions    Amitriptyline     Amoxicillin-Pot Clavulanate     Aspirin     Capsaicin-Menthol-Methyl Sal     Eggs Or Egg-Derived Products     Morphine     Nitrofurantoin Monohyd Macro     Nsaids        MEDICATIONS    Current Outpatient Medications on File Prior to Encounter   Medication Sig Dispense Refill    clindamycin (CLEOCIN) 300 MG capsule Take 1 capsule by mouth 4 times daily for 10 days 40 capsule 0    furosemide (LASIX) 40 MG tablet Take 40 mg by mouth daily      buPROPion (WELLBUTRIN XL) 150 MG extended release tablet Take 150 mg by mouth every morning      ferrous sulfate (FE TABS 325) 325 (65 Fe) MG EC tablet Take 1 tablet by mouth 2 times daily 60 tablet 5    albuterol sulfate HFA (VENTOLIN HFA) 108 (90 Base) MCG/ACT inhaler Inhale 2 puffs into the lungs every 6 hours as needed for Wheezing       albuterol (PROVENTIL) (2.5 MG/3ML) 0.083% nebulizer solution Take 2.5 mg by nebulization 3 times daily      ALPRAZolam (XANAX) 0.25 MG tablet Take 0.25 mg by mouth as needed for Sleep.       colestipol (COLESTID) 1 g tablet Take 1 g by mouth 2 times daily Take 2 tablets by mouth two times a day      fluticasone-salmeterol (ADVAIR DISKUS) 100-50 MCG/DOSE diskus inhaler Inhale 1 puff into the lungs every 12 hours      lisinopril (PRINIVIL;ZESTRIL) 10 MG tablet Take 10 mg by mouth daily      loratadine (CLARITIN) 10 MG capsule Take 10 mg by mouth daily      omeprazole (PRILOSEC) 40 MG delayed release capsule Take 40 mg by mouth daily      potassium chloride (MICRO-K) 10 MEQ extended release capsule Take 10 mEq by mouth daily      azelastine (ASTELIN) 0.1 % nasal spray 1 spray by Nasal route 2 times daily Use in each nostril as Wound Width (cm) 0.6 cm 10/27/20 0947   Wound Depth (cm) 0.5 cm 10/27/20 0947   Wound Surface Area (cm^2) 0.54 cm^2 10/27/20 0947   Wound Volume (cm^3) 0.27 cm^3 10/27/20 0947   Post-Procedure Length (cm) 0.9 cm 10/27/20 0947   Post-Procedure Width (cm) 0.7 cm 10/27/20 0947   Post-Procedure Depth (cm) 0.7 cm 10/27/20 0947   Post-Procedure Surface Area (cm^2) 0.63 cm^2 10/27/20 0947   Post-Procedure Volume (cm^3) 0.44 cm^3 10/27/20 0947   Drainage Amount Scant 10/27/20 0947   Drainage Description Serosanguinous 10/27/20 0947   Odor None 10/27/20 0947   Trinh-wound Assessment Warm 10/27/20 0947   Margins Defined edges 10/27/20 0947   Number of days: 0          Percent of Wound(s)/Ulcer(s) Debrided: 100%    Total Surface Area Debrided:  1 sq cm     Estimated Blood Loss:  Minimal    Hemostasis Achieved:  by pressure    Procedural Pain:  1  / 10     Post Procedural Pain:  0 / 10     Response to treatment:  Well tolerated by patient. Plan:     1. Silver alginate cover with dry dressing ,   2. Follow up in 1 week    New Prescriptions    No medications on file       Treatment Note please see attached Discharge Instructions    Written patient dismissal instructions given to patient and signed by patient or POA. Discharge Instructions       Follow up with Dr. Javier Billingsley in wound care on 11/3/2020 at 9 AM.    Place silver alginate to wound on right lower leg. Cover with dry gauze. Change dressing every other day. SIGNS OF INFECTION  - Redness, swelling, skin hot  - Wound bed turns black or stringy yellow  - Foul odor  - Increased drainage or pus  - Increased pain  - Fever greater than 100F    CALL YOUR DOCTOR OR SEEK MEDICAL ATTENTION IF SIGNS OF INFECTION.   DO NOT WAIT UNTIL YOUR NEXT APPOINTMENT    Call the Wound Care Nurse with any other questions or concerns- [phone]          Electronically signed by Kenisha Cruz MD on 10/27/2020 at 2:15 PM

## 2020-10-27 NOTE — PLAN OF CARE
Problem: Wound:  Goal: Will show signs of wound healing; wound closure and no evidence of infection  Description: Will show signs of wound healing; wound closure and no evidence of infection  10/27/2020 0952 by Yolis Downey RN  Outcome: Ongoing  10/27/2020 0952 by Yolis Downey RN  Outcome: Ongoing     Problem: Weight control:  Goal: Ability to maintain an optimal weight for height and age will be supported  Description: Ability to maintain an optimal weight for height and age will be supported  10/27/2020 5372 by Yolis Downey RN  Outcome: Ongoing  10/27/2020 0952 by Yolis Downey RN  Outcome: Ongoing     Problem: Falls - Risk of:  Goal: Will remain free from falls  Description: Will remain free from falls  10/27/2020 0952 by Yolis Downey RN  Outcome: Ongoing  10/27/2020 0952 by Yolis Downey RN  Outcome: Ongoing

## 2020-10-30 LAB
CULTURE: ABNORMAL
DIRECT EXAM: ABNORMAL
Lab: ABNORMAL
SPECIMEN DESCRIPTION: ABNORMAL

## 2020-11-03 ENCOUNTER — TELEPHONE (OUTPATIENT)
Dept: PAIN MANAGEMENT | Age: 65
End: 2020-11-03

## 2020-11-03 ENCOUNTER — OFFICE VISIT (OUTPATIENT)
Dept: PAIN MANAGEMENT | Age: 65
End: 2020-11-03
Payer: COMMERCIAL

## 2020-11-03 ENCOUNTER — HOSPITAL ENCOUNTER (OUTPATIENT)
Dept: WOUND CARE | Age: 65
Discharge: HOME | End: 2020-11-04
Payer: COMMERCIAL

## 2020-11-03 VITALS
BODY MASS INDEX: 38.76 KG/M2 | SYSTOLIC BLOOD PRESSURE: 128 MMHG | HEIGHT: 64 IN | WEIGHT: 227 LBS | TEMPERATURE: 97.1 F | DIASTOLIC BLOOD PRESSURE: 78 MMHG | HEART RATE: 65 BPM | OXYGEN SATURATION: 96 % | RESPIRATION RATE: 18 BRPM

## 2020-11-03 VITALS
SYSTOLIC BLOOD PRESSURE: 128 MMHG | RESPIRATION RATE: 18 BRPM | BODY MASS INDEX: 38.76 KG/M2 | HEART RATE: 65 BPM | HEIGHT: 64 IN | DIASTOLIC BLOOD PRESSURE: 78 MMHG | TEMPERATURE: 97.1 F | WEIGHT: 227 LBS

## 2020-11-03 PROBLEM — M54.16 LUMBAR RADICULOPATHY: Status: ACTIVE | Noted: 2020-11-03

## 2020-11-03 PROBLEM — M47.816 LUMBAR FACET JOINT SYNDROME: Status: ACTIVE | Noted: 2020-11-03

## 2020-11-03 PROBLEM — M54.06 PANNICULITIS INVOLVING LUMBAR REGION: Status: ACTIVE | Noted: 2020-11-03

## 2020-11-03 PROCEDURE — 99204 OFFICE O/P NEW MOD 45 MIN: CPT | Performed by: NURSE PRACTITIONER

## 2020-11-03 PROCEDURE — 11042 DBRDMT SUBQ TIS 1ST 20SQCM/<: CPT | Performed by: SURGERY

## 2020-11-03 RX ORDER — LIDOCAINE 50 MG/G
OINTMENT TOPICAL ONCE
Status: CANCELLED | OUTPATIENT
Start: 2020-11-03

## 2020-11-03 RX ORDER — LIDOCAINE HYDROCHLORIDE 20 MG/ML
JELLY TOPICAL ONCE
Status: CANCELLED | OUTPATIENT
Start: 2020-11-03

## 2020-11-03 RX ORDER — LIDOCAINE HYDROCHLORIDE 40 MG/ML
SOLUTION TOPICAL ONCE
Status: CANCELLED | OUTPATIENT
Start: 2020-11-03

## 2020-11-03 RX ORDER — BACITRACIN ZINC AND POLYMYXIN B SULFATE 500; 1000 [USP'U]/G; [USP'U]/G
OINTMENT TOPICAL ONCE
Status: CANCELLED | OUTPATIENT
Start: 2020-11-03

## 2020-11-03 RX ORDER — CLOBETASOL PROPIONATE 0.5 MG/G
OINTMENT TOPICAL ONCE
Status: CANCELLED | OUTPATIENT
Start: 2020-11-03

## 2020-11-03 RX ORDER — GINSENG 100 MG
CAPSULE ORAL ONCE
Status: CANCELLED | OUTPATIENT
Start: 2020-11-03

## 2020-11-03 RX ORDER — BACITRACIN, NEOMYCIN, POLYMYXIN B 400; 3.5; 5 [USP'U]/G; MG/G; [USP'U]/G
OINTMENT TOPICAL ONCE
Status: CANCELLED | OUTPATIENT
Start: 2020-11-03

## 2020-11-03 RX ORDER — BETAMETHASONE DIPROPIONATE 0.05 %
OINTMENT (GRAM) TOPICAL ONCE
Status: CANCELLED | OUTPATIENT
Start: 2020-11-03

## 2020-11-03 RX ORDER — GENTAMICIN SULFATE 1 MG/G
OINTMENT TOPICAL ONCE
Status: CANCELLED | OUTPATIENT
Start: 2020-11-03

## 2020-11-03 RX ORDER — CIPROFLOXACIN 500 MG/1
500 TABLET, FILM COATED ORAL 2 TIMES DAILY
Qty: 14 TABLET | Refills: 0 | Status: SHIPPED | OUTPATIENT
Start: 2020-11-03 | End: 2020-11-10

## 2020-11-03 ASSESSMENT — PAIN SCALES - GENERAL: PAINLEVEL_OUTOF10: 4

## 2020-11-03 ASSESSMENT — ENCOUNTER SYMPTOMS
SORE THROAT: 0
BACK PAIN: 1
DIARRHEA: 1
SHORTNESS OF BREATH: 0

## 2020-11-03 NOTE — TELEPHONE ENCOUNTER
Patient was notified and voices understanding.   Will call us when she has a better idea of what is going on

## 2020-11-03 NOTE — PATIENT INSTRUCTIONS
[facility] [address]  Telephone [phone]  Fax [phone]    PROCEDURE INSTRUCTIONS FOR  PAIN MANAGEMENT PROCEDURES WITH ANESTHESIA/ IV SEDATION    Thu Kaur is scheduled to see Dr. Lee Ann Rasmussen to undergo the following procedure:   Bilateral L4/L5 and L5/S1 facet injection    Procedure Date: ____11/20/20____    You will receive a phone call the day prior to your procedure to confirm a time or arrival.    Report to the [place] [id number], Registration office on the 1st floor in the hospital, after check in and signing of paper work you will then go to the second floor to the surgery center. 1. Stop the following medications prior to the procedure:   Nothing to stop at this time    2. Take all routine medications unless otherwise instructed. Ok to take vitamins and antiinflammatory medications    3. EATING & DRINKING:  YOUR PROCEDURE REQUIRES ANESTHESIA, NO FOOD OR LIQUIDS FOR 8 HOURS PRIOR TO THE PROCEDURE    4. If you are allergic to contrast or iodine, you must take benadryl and prednisone prior to the injection to prevent an allergic reaction. Follow the directions on the prescription for the times to take the medication. 5.  Wear simple loose clothing, which can be easily changed. 6.  Leave jewelry (including rings) and other valuables at home. 7.  Make arrangements for a family member or friend to drive you to the surgery center. Your ride must stay in the hospital while you are having the injection done. If they cannot stay, the injection will be rescheduled. The sedation may affect your judgment following the procedure and driving a vehicle within 24 hours after the sedation could be dangerous. 8.  You will be asked to sign several forms prior to surgery; patients under the age of 25 must have a parent or legal guardian sign the permit to be able to do the procedure.       9.  You must have finished any antibiotic prescribed for recent infections. If required, please take pre-procedure antibiotic or other pre-procedure medications as instructed. 10. Bring inhalers and pain medications with you to your procedure. 11. Bring your MRI/CT films if they were done outside of the [facility]. 12. If you should develop a cold, sore throat, cough, fever or other new indication of illness or infection, or are started on antibiotics within 2 weeks of the scheduled procedure, please notify the [facility] office as early as possible at [phone]. If calling after 4:30pm the day prior to your scheduled procedure please contact [phone] and Leave a Voice Message.

## 2020-11-03 NOTE — PROGRESS NOTES
Subjective:      Patient ID: Loc Lai is a 60 y.o. female. Chief Complaint   Patient presents with    New Patient     DDD     HPI Initial new patient consult. Referred via ortho, they were treating her for trochanteric bursitis. Effective for the right side, not the left so MRI was ordered. Burning discomfort low back, uncomfortable to stand for prolonged periods of time. She has not had any physical therapy. Currently being treated for wound right ankle. She uses TENS unit, helps some. Feels like she has poor posture. She was taking alleve with very good pain relief. Was told to stop taking due to iron deficiency anemia.     Pain Assessment  Location of Pain: Back(left hip)  Location Modifiers: Left, Posterior, Inferior  Severity of Pain: 5  Quality of Pain: Aching  Duration of Pain: Persistent  Frequency of Pain: Constant  Aggravating Factors: Bending, Stretching, Straightening, Exercise, Kneeling, Squatting, Standing, Walking, Stairs  Limiting Behavior: Yes  Relieving Factors: (patient reports no relief )  Result of Injury: No  Work-Related Injury: No    Allergies   Allergen Reactions    Amitriptyline     Amoxicillin-Pot Clavulanate     Aspirin     Capsaicin-Menthol-Methyl Sal     Eggs Or Egg-Derived Products     Morphine     Nitrofurantoin Monohyd Macro     Nsaids        Outpatient Medications Marked as Taking for the 11/3/20 encounter (Office Visit) with DIAMOND Branch CNP   Medication Sig Dispense Refill    ciprofloxacin (CIPRO) 500 MG tablet Take 1 tablet by mouth 2 times daily for 7 days 14 tablet 0    clindamycin (CLEOCIN) 300 MG capsule Take 1 capsule by mouth 4 times daily for 10 days 40 capsule 0    furosemide (LASIX) 40 MG tablet Take 40 mg by mouth daily      baclofen (LIORESAL) 20 MG tablet Take 1 tablet by mouth 4 times daily 60 tablet 2    buPROPion (WELLBUTRIN XL) 150 MG extended release tablet Take 150 mg by mouth every morning      ferrous sulfate (FE TABS 325) 325 (65 Blood Pressure Mother     COPD Father     Heart Disease Father     High Blood Pressure Father     Cancer Father         lung       Social History     Socioeconomic History    Marital status:      Spouse name: None    Number of children: None    Years of education: None    Highest education level: None   Occupational History    None   Social Needs    Financial resource strain: None    Food insecurity     Worry: None     Inability: None    Transportation needs     Medical: None     Non-medical: None   Tobacco Use    Smoking status: Former Smoker     Packs/day: 1.00     Types: Cigarettes     Start date: 7/10/1970     Last attempt to quit: 7/10/1984     Years since quittin.3    Smokeless tobacco: Never Used    Tobacco comment: quit cold turkey   Substance and Sexual Activity    Alcohol use: Yes     Frequency: Monthly or less     Drinks per session: 1 or 2     Binge frequency: Never    Drug use: Never    Sexual activity: None   Lifestyle    Physical activity     Days per week: None     Minutes per session: None    Stress: None   Relationships    Social connections     Talks on phone: None     Gets together: None     Attends [employer] service: None     Active member of club or organization: None     Attends meetings of clubs or organizations: None     Relationship status: None    Intimate partner violence     Fear of current or ex partner: None     Emotionally abused: None     Physically abused: None     Forced sexual activity: None   Other Topics Concern    None   Social History Narrative    None     Review of Systems   Constitutional: Positive for activity change. Negative for fever. HENT: Negative for sore throat. Respiratory: Negative for shortness of breath. Cardiovascular: Negative for chest pain. Gastrointestinal: Positive for diarrhea. Genitourinary: Negative for difficulty urinating. Musculoskeletal: Positive for arthralgias, back pain and myalgias.    Skin: Positive for wound. Neurological: Negative for weakness and numbness. Hematological: Bruises/bleeds easily. Psychiatric/Behavioral: Positive for sleep disturbance. Objective:   Physical Exam  Vitals signs reviewed. Constitutional:       General: She is awake. She is not in acute distress. Appearance: Normal appearance. She is well-developed and well-groomed. She is not ill-appearing, toxic-appearing or diaphoretic. Interventions: She is not intubated. HENT:      Head: Normocephalic and atraumatic. Right Ear: External ear normal.      Left Ear: External ear normal.      Nose: Nose normal.      Mouth/Throat:      Lips: Pink. Mouth: Mucous membranes are moist.   Eyes:      General: Lids are normal. Gaze aligned appropriately. Pulmonary:      Effort: Pulmonary effort is normal. No tachypnea, bradypnea, accessory muscle usage, prolonged expiration, respiratory distress or retractions. She is not intubated. Abdominal:      General: Abdomen is protuberant. Palpations: Abdomen is soft. Musculoskeletal:      Lumbar back: She exhibits decreased range of motion (extension induced pain. positive cart sign), tenderness (bilateral facets) and pain. Comments: MRI OF THE LUMBAR SPINE WITHOUT CONTRAST, 10/22/2020 8:13 am       TECHNIQUE:   Multiplanar multisequence MRI of the lumbar spine was performed without the   administration of intravenous contrast.       COMPARISON:   None.       HISTORY:   ORDERING SYSTEM PROVIDED HISTORY: DDD (degenerative disc disease), lumbar   TECHNOLOGIST PROVIDED HISTORY:   Reason for Exam:  Low back pain and left hip pain. Left leg pain, constant   aching in left leg. Symptoms for a while.    Acuity: Chronic   Type of Exam: Initial   Additional signs and symptoms: DDD, lumbar; Lumbar radiculopathy       FINDINGS:   BONES/ALIGNMENT: Vertebral body heights are maintained.  There is   age-appropriate bone marrow signal. Amedeo Nails is a T2 hyperintense focus in is no clubbing. Neurological:      Mental Status: She is alert and oriented to person, place, and time. GCS: GCS eye subscore is 4. GCS verbal subscore is 5. GCS motor subscore is 6. Cranial Nerves: No cranial nerve deficit. Psychiatric:         Attention and Perception: Attention and perception normal.         Speech: Speech normal.         Behavior: Behavior normal. Behavior is cooperative. Cognition and Memory: Cognition normal.         Judgment: Judgment normal.         Assessment:       Diagnosis Orders   1. Lumbar facet joint syndrome     2. Degeneration of lumbar intervertebral disc     3. Panniculitis involving lumbar region     4. Lumbar radiculopathy             Plan:      Lumbar MRI reviewed. Will schedule bilateral L4/L5 and L5/S1 facet injections    The patient was counseled at length about the risks of nathan Covid-19 during their perioperative period and any recovery window from their procedure. The patient was made aware that nathan Covid-19  may worsen their prognosis for recovering from their procedure  and lend to a higher morbidity and/or mortality risk. All material risks, benefits, and reasonable alternatives including postponing the procedure were discussed. The patient does wish to proceed with the procedure at this time.               Navi Valdivia, APRN - CNP

## 2020-11-03 NOTE — TELEPHONE ENCOUNTER
Patient is on ATB now for her foot and will be on another atb for rounghly 4 to 8 weeks per her statement to the writer while scheduling her procedure. Is she ok to proceed with back injections? This atb is for a wound on her foot. Pt last seen [employer] [facility] [employer] today as a new patient.         If you do not want to do procedure when do you want her to follow up again in the office

## 2020-11-03 NOTE — PLAN OF CARE
Problem: Pain:  Description: Pain management should include both nonpharmacologic and pharmacologic interventions.   Goal: Pain level will decrease  Description: Pain level will decrease  Outcome: Ongoing  Goal: Control of acute pain  Description: Control of acute pain  Outcome: Ongoing  Goal: Control of chronic pain  Description: Control of chronic pain  Outcome: Ongoing     Problem: Wound:  Goal: Will show signs of wound healing; wound closure and no evidence of infection  Description: Will show signs of wound healing; wound closure and no evidence of infection  Outcome: Ongoing     Problem: Venous:  Goal: Signs of wound healing will improve  Description: Signs of wound healing will improve  Outcome: Ongoing     Problem: Weight control:  Goal: Ability to maintain an optimal weight for height and age will be supported  Description: Ability to maintain an optimal weight for height and age will be supported  Outcome: Ongoing     Problem: Falls - Risk of:  Goal: Will remain free from falls  Description: Will remain free from falls  Outcome: Ongoing

## 2020-11-03 NOTE — PROGRESS NOTES
[address]   Progress Note and Procedure Note      Jax Mauricio [facility]  MEDICAL RECORD NUMBER:  [id number]  AGE: 60 y.o. GENDER: female  : 1955  EPISODE DATE:  11/3/2020    Subjective:     Chief Complaint   Patient presents with    Wound Check     right lower leg wound         HISTORY of PRESENT ILLNESS HPI  11/3/2020  Patient is here for evaluation of wound on right lower leg. She had it with a  and got a cellulitis and eschar. She was treated with 2 rounds of Bactrim. Wound culture showed resistant to Bactrim the patient was seen last week. We remove the eschar and she had some necrotic subcutaneous tissue. We debrided it. And we continued clindamycin and silver alginate and asked her to return today. She states her leg is a little bit more swollen and slightly redder she thinks she had a reaction to the tape. PAST MEDICAL HISTORY        Diagnosis Date    Adjustment disorder with mixed emotional features 2020    Anemia 2020    Anxiety 2020    Asthma 2009    Chronic airway obstruction (HCC)     Chronic idiopathic constipation     Chronic pain     Chronic sinusitis     Colon cancer (HCC)     Degeneration of lumbar intervertebral disc     Diastolic heart failure (HCC)     GERD (gastroesophageal reflux disease) 2006    Herpes simplex     Hyperlipidemia 2006    Hypertension     Iron deficiency anemia 2020    LAYTON (obstructive sleep apnea) 2017    Osteoarthritis     Sciatica     Thrombosed hemorrhoids        PAST SURGICAL HISTORY    History reviewed. No pertinent surgical history.     FAMILY HISTORY    Family History   Problem Relation Age of Onset    Heart Disease Mother     High Blood Pressure Mother     COPD Father     Heart Disease Father     High Blood Pressure Father     Cancer Father         lung       SOCIAL HISTORY    Social History     Tobacco Use    Smoking status: Former Smoker Packs/day: 1.00     Types: Cigarettes     Start date: 7/10/1970     Last attempt to quit: 7/10/1984     Years since quittin.3    Smokeless tobacco: Never Used    Tobacco comment: quit cold turkey   Substance Use Topics    Alcohol use: Yes     Frequency: Monthly or less     Drinks per session: 1 or 2     Binge frequency: Never    Drug use: Never       ALLERGIES    Allergies   Allergen Reactions    Amitriptyline     Amoxicillin-Pot Clavulanate     Aspirin     Capsaicin-Menthol-Methyl Sal     Eggs Or Egg-Derived Products     Morphine     Nitrofurantoin Monohyd Macro     Nsaids        MEDICATIONS    Current Outpatient Medications on File Prior to Encounter   Medication Sig Dispense Refill    clindamycin (CLEOCIN) 300 MG capsule Take 1 capsule by mouth 4 times daily for 10 days 40 capsule 0    furosemide (LASIX) 40 MG tablet Take 40 mg by mouth daily      baclofen (LIORESAL) 20 MG tablet Take 1 tablet by mouth 4 times daily 60 tablet 2    buPROPion (WELLBUTRIN XL) 150 MG extended release tablet Take 150 mg by mouth every morning      ferrous sulfate (FE TABS 325) 325 (65 Fe) MG EC tablet Take 1 tablet by mouth 2 times daily 60 tablet 5    albuterol sulfate HFA (VENTOLIN HFA) 108 (90 Base) MCG/ACT inhaler Inhale 2 puffs into the lungs every 6 hours as needed for Wheezing       albuterol (PROVENTIL) (2.5 MG/3ML) 0.083% nebulizer solution Take 2.5 mg by nebulization 3 times daily      ALPRAZolam (XANAX) 0.25 MG tablet Take 0.25 mg by mouth as needed for Sleep.       colestipol (COLESTID) 1 g tablet Take 1 g by mouth 2 times daily Take 2 tablets by mouth two times a day      fluticasone-salmeterol (ADVAIR DISKUS) 100-50 MCG/DOSE diskus inhaler Inhale 1 puff into the lungs every 12 hours      lisinopril (PRINIVIL;ZESTRIL) 10 MG tablet Take 10 mg by mouth daily      loratadine (CLARITIN) 10 MG capsule Take 10 mg by mouth daily      omeprazole (PRILOSEC) 40 MG delayed release capsule Take 40 mg by mouth daily      potassium chloride (MICRO-K) 10 MEQ extended release capsule Take 10 mEq by mouth daily      azelastine (ASTELIN) 0.1 % nasal spray 1 spray by Nasal route 2 times daily Use in each nostril as directed      fluticasone (FLONASE) 50 MCG/ACT nasal spray 2 sprays by Each Nostril route daily       No current facility-administered medications on file prior to encounter. Objective:      /78   Pulse 65   Temp 97.1 °F (36.2 °C) (Tympanic)   Resp 18   Ht 5' 4\" (1.626 m)   Wt 227 lb (103 kg)   BMI 38.96 kg/m²     Wt Readings from Last 3 Encounters:   11/03/20 227 lb (103 kg)   10/27/20 229 lb (103.9 kg)   10/26/20 236 lb (107 kg)       PHYSICAL EXAM  Physical Exam    Postdebridement:        Predebridement:          Assessment:        Problem List Items Addressed This Visit     Wound of right leg - Primary           Procedure Note  Indications:  Based on my examination of this patient's wound(s)/ulcer(s) today, debridement is required to promote healing and evaluate the wound base. Performed by: Pablo Cadet MD    Consent obtained:  Yes    Time out taken:  Yes    Pain Control: Anesthetic  Anesthetic: None       Debridement: Excisional Debridement    Using #15 blade scalpel the wound(s)/ulcer(s) was/were debrided down through and including the removal of dermis and subcutaneous tissue. Devitalized Tissue Debrided:  biofilm and necrotic/eschar    Pre Debridement Measurements:  Are located in the [place]  Documentation Flow Sheet    Wound/Ulcer #: 1    Post Debridement Measurements:  Wound/Ulcer Descriptions are Pre Debridement except measurements:    Wound 10/27/20 Leg Right; Lower (Active)   Wound Etiology Traumatic 11/03/20 0854   Dressing Status Old drainage noted 11/03/20 0854   Wound Cleansed Cleansed with saline 11/03/20 0854   Dressing/Treatment Alginate with Ag 11/03/20 0854   Offloading for Diabetic Foot Ulcers No 11/03/20 0854   Dressing Change Due 11/05/20 11/03/20 3361   Wound Length (cm) 1.2 cm 11/03/20 0854   Wound Width (cm) 1 cm 11/03/20 0854   Wound Depth (cm) 0.5 cm 11/03/20 0854   Wound Surface Area (cm^2) 1.2 cm^2 11/03/20 0854   Change in Wound Size % (l*w) -122.22 11/03/20 0854   Wound Volume (cm^3) 0.6 cm^3 11/03/20 0854   Wound Healing % -122 11/03/20 0854   Post-Procedure Length (cm) 0.9 cm 10/27/20 0947   Post-Procedure Width (cm) 0.7 cm 10/27/20 0947   Post-Procedure Depth (cm) 0.7 cm 10/27/20 0947   Post-Procedure Surface Area (cm^2) 0.63 cm^2 10/27/20 0947   Post-Procedure Volume (cm^3) 0.44 cm^3 10/27/20 0947   Wound Assessment Pink/red 11/03/20 0854   Drainage Amount Scant 11/03/20 0854   Drainage Description Serosanguinous 11/03/20 0854   Odor None 11/03/20 0854   Trinh-wound Assessment Warm 11/03/20 0854   Margins Defined edges 11/03/20 0854   Wound Thickness Description not for Pressure Injury Full thickness 11/03/20 0854   Number of days: 7          Percent of Wound(s)/Ulcer(s) Debrided: 100%    Total Surface Area Debrided:  1 sq cm     Estimated Blood Loss:  Minimal    Hemostasis Achieved:  by silver nitrate stick and 1 3-0 vicryl suture    Procedural Pain:  1  / 10     Post Procedural Pain:  0 / 10     Response to treatment:  Well tolerated by patient. Plan:     1. We will go to wet-to-dry dressings twice a day to help with debridement. 2.  Courage patient to elevate the legs during the day. She needs to get the swelling and edema of the legs. She will not or cannot tolerate a compression stocking or wrap. 3.  We will add Cipro as the culture showed that it was resistant to the Bactrim. It is sensitive to Cipro. Clindamycin was not tested. We will continue both for 1 week  4.   Follow up in one week    New Prescriptions    CIPROFLOXACIN (CIPRO) 500 MG TABLET    Take 1 tablet by mouth 2 times daily for 7 days       Treatment Note please see attached Discharge Instructions    Written patient dismissal instructions given to patient and signed by patient or POA. Discharge Instructions       Follow up with Dr. Goldy Taylor in wound clinic on 11/10/2020 at 9:30 AM.    Wet to dry dressing to right leg wound. Change dressing twice a day. SIGNS OF INFECTION  - Redness, swelling, skin hot  - Wound bed turns black or stringy yellow  - Foul odor  - Increased drainage or pus  - Increased pain  - Fever greater than 100F    CALL YOUR DOCTOR OR SEEK MEDICAL ATTENTION IF SIGNS OF INFECTION.   DO NOT WAIT UNTIL YOUR NEXT APPOINTMENT    Call the Wound Care Nurse with any other questions or concerns- [phone]          Electronically signed by Larissa Sen MD on 11/3/2020 at 10:03 AM

## 2020-11-10 ENCOUNTER — HOSPITAL ENCOUNTER (OUTPATIENT)
Dept: WOUND CARE | Age: 65
Discharge: HOME | End: 2020-11-11
Payer: COMMERCIAL

## 2020-11-10 VITALS
SYSTOLIC BLOOD PRESSURE: 128 MMHG | HEART RATE: 80 BPM | DIASTOLIC BLOOD PRESSURE: 70 MMHG | RESPIRATION RATE: 20 BRPM | WEIGHT: 232 LBS | BODY MASS INDEX: 39.61 KG/M2 | HEIGHT: 64 IN | TEMPERATURE: 97 F

## 2020-11-10 PROCEDURE — 11042 DBRDMT SUBQ TIS 1ST 20SQCM/<: CPT | Performed by: SURGERY

## 2020-11-10 RX ORDER — CIPROFLOXACIN 500 MG/1
500 TABLET, FILM COATED ORAL 2 TIMES DAILY
Qty: 14 TABLET | Refills: 0 | Status: SHIPPED | OUTPATIENT
Start: 2020-11-10 | End: 2020-11-17

## 2020-11-10 RX ORDER — LIDOCAINE HYDROCHLORIDE 20 MG/ML
JELLY TOPICAL ONCE
Status: CANCELLED | OUTPATIENT
Start: 2020-11-10

## 2020-11-10 RX ORDER — CIPROFLOXACIN 500 MG/1
500 TABLET, FILM COATED ORAL 2 TIMES DAILY
Qty: 14 TABLET | Refills: 0 | Status: SHIPPED | OUTPATIENT
Start: 2020-11-10 | End: 2021-01-11 | Stop reason: ALTCHOICE

## 2020-11-10 RX ORDER — GENTAMICIN SULFATE 1 MG/G
OINTMENT TOPICAL ONCE
Status: CANCELLED | OUTPATIENT
Start: 2020-11-10

## 2020-11-10 RX ORDER — BACITRACIN, NEOMYCIN, POLYMYXIN B 400; 3.5; 5 [USP'U]/G; MG/G; [USP'U]/G
OINTMENT TOPICAL ONCE
Status: CANCELLED | OUTPATIENT
Start: 2020-11-10

## 2020-11-10 RX ORDER — BACITRACIN ZINC AND POLYMYXIN B SULFATE 500; 1000 [USP'U]/G; [USP'U]/G
OINTMENT TOPICAL ONCE
Status: CANCELLED | OUTPATIENT
Start: 2020-11-10

## 2020-11-10 RX ORDER — LIDOCAINE HYDROCHLORIDE 40 MG/ML
SOLUTION TOPICAL ONCE
Status: CANCELLED | OUTPATIENT
Start: 2020-11-10

## 2020-11-10 RX ORDER — CLOBETASOL PROPIONATE 0.5 MG/G
OINTMENT TOPICAL ONCE
Status: CANCELLED | OUTPATIENT
Start: 2020-11-10

## 2020-11-10 RX ORDER — LANSOPRAZOLE 30 MG/1
CAPSULE, DELAYED RELEASE ORAL
COMMUNITY
Start: 2020-10-13

## 2020-11-10 RX ORDER — LIDOCAINE 50 MG/G
OINTMENT TOPICAL ONCE
Status: CANCELLED | OUTPATIENT
Start: 2020-11-10

## 2020-11-10 RX ORDER — POTASSIUM CHLORIDE 750 MG/1
TABLET, EXTENDED RELEASE ORAL
COMMUNITY
Start: 2020-10-13 | End: 2021-01-11 | Stop reason: SDUPTHER

## 2020-11-10 RX ORDER — GINSENG 100 MG
CAPSULE ORAL ONCE
Status: CANCELLED | OUTPATIENT
Start: 2020-11-10

## 2020-11-10 RX ORDER — BETAMETHASONE DIPROPIONATE 0.05 %
OINTMENT (GRAM) TOPICAL ONCE
Status: CANCELLED | OUTPATIENT
Start: 2020-11-10

## 2020-11-10 ASSESSMENT — PAIN SCALES - GENERAL: PAINLEVEL_OUTOF10: 0

## 2020-11-10 NOTE — PROGRESS NOTES
[address]   Progress Note and Procedure Note      Caryle Avena Hand  MEDICAL RECORD NUMBER:  [id number]  AGE: 60 y.o. GENDER: female  : 1955  EPISODE DATE:  11/10/2020    Subjective:     Chief Complaint   Patient presents with    Wound Check     right leg         HISTORY of PRESENT ILLNESS HPI  11/10/2020  Patient is here for follow-up of her wound of the right leg. This is the wound that she got from hitting the . She is been on several rounds of Bactrim and was just placed on Cipro. It is doing better. We debrided the wound a little wider last time. She is having a little bit less pain although her leg is a little bit more swollen. She said she had been on it more this week. She is currently doing wet-to-dry dressings twice a day. 11/3/2020  Patient is here for evaluation of wound on right lower leg. She had it with a  and got a cellulitis and eschar. She was treated with 2 rounds of Bactrim. Wound culture showed resistant to Bactrim the patient was seen last week. We remove the eschar and she had some necrotic subcutaneous tissue. We debrided it. And we continued clindamycin and silver alginate and asked her to return today. She states her leg is a little bit more swollen and slightly redder she thinks she had a reaction to the tape.          PAST MEDICAL HISTORY        Diagnosis Date    Adjustment disorder with mixed emotional features 2020    Anemia 2020    Anxiety 2020    Asthma 2009    Chronic airway obstruction (HCC)     Chronic idiopathic constipation     Chronic pain     Chronic sinusitis     Colon cancer (HCC)     Degeneration of lumbar intervertebral disc     Diastolic heart failure (HCC)     GERD (gastroesophageal reflux disease) 2006    Herpes simplex     Hyperlipidemia 2006    Hypertension     Iron deficiency anemia 2020    LAYTON (obstructive sleep apnea) 2017    needed for Wheezing       albuterol (PROVENTIL) (2.5 MG/3ML) 0.083% nebulizer solution Take 2.5 mg by nebulization 3 times daily      ALPRAZolam (XANAX) 0.25 MG tablet Take 0.25 mg by mouth as needed for Sleep.  colestipol (COLESTID) 1 g tablet Take 1 g by mouth 2 times daily Take 2 tablets by mouth two times a day      fluticasone-salmeterol (ADVAIR DISKUS) 100-50 MCG/DOSE diskus inhaler Inhale 1 puff into the lungs every 12 hours      lisinopril (PRINIVIL;ZESTRIL) 10 MG tablet Take 10 mg by mouth daily      loratadine (CLARITIN) 10 MG capsule Take 10 mg by mouth daily      omeprazole (PRILOSEC) 40 MG delayed release capsule Take 40 mg by mouth daily      potassium chloride (MICRO-K) 10 MEQ extended release capsule Take 10 mEq by mouth daily      azelastine (ASTELIN) 0.1 % nasal spray 1 spray by Nasal route 2 times daily Use in each nostril as directed      fluticasone (FLONASE) 50 MCG/ACT nasal spray 2 sprays by Each Nostril route daily       No current facility-administered medications on file prior to encounter. Objective:      /70   Pulse 80   Temp 97 °F (36.1 °C) (Tympanic)   Resp 20   Ht 5' 4\" (1.626 m)   Wt 232 lb (105.2 kg)   BMI 39.82 kg/m²     Wt Readings from Last 3 Encounters:   11/10/20 232 lb (105.2 kg)   11/03/20 227 lb (103 kg)   11/03/20 227 lb (103 kg)       PHYSICAL EXAM  Physical Exam           The wound looks a little better today. It is clear around the edges no redness in the sub or surrounding area. There is some budding of hyper granulation tissue and some fibrin biofilm. Assessment:        Problem List Items Addressed This Visit     Wound of right leg - Primary    Relevant Orders    Supply: Wound Cleanser    Supply: Wound Dressings           Procedure Note  Indications:  Based on my examination of this patient's wound(s)/ulcer(s) today, debridement is required to promote healing and evaluate the wound base.     Performed by: Sergei Jasso MD    Consent obtained:  Yes    Time out taken:  Yes    Pain Control: Anesthetic  Anesthetic: 4% Lidocaine Liquid Topical       Debridement: Excisional Debridement    Using curette the wound(s)/ulcer(s) was/were debrided down through and including the removal of subcutaneous tissue. Devitalized Tissue Debrided:  slough    Pre Debridement Measurements:  Are located in the [place]  Documentation Flow Sheet    Wound/Ulcer #: 1    Post Debridement Measurements:  Wound/Ulcer Descriptions are Pre Debridement except measurements:    Wound 10/27/20 Leg Right; Lower (Active)   Wound Image   11/03/20 1003   Wound Etiology Traumatic 11/10/20 0933   Dressing Status Old drainage noted 11/10/20 0933   Wound Cleansed Cleansed with saline 11/10/20 0933   Dressing/Treatment Moist to dry 11/03/20 0854   Offloading for Diabetic Foot Ulcers No 11/10/20 0933   Dressing Change Due 11/05/20 11/03/20 0854   Wound Length (cm) 2 cm 11/10/20 0933   Wound Width (cm) 1.5 cm 11/10/20 0933   Wound Depth (cm) 0.6 cm 11/10/20 0933   Wound Surface Area (cm^2) 3 cm^2 11/10/20 0933   Change in Wound Size % (l*w) -455.56 11/10/20 0933   Wound Volume (cm^3) 1.8 cm^3 11/10/20 0933   Wound Healing % -567 11/10/20 0933   Post-Procedure Length (cm) 2.1 cm 11/10/20 0933   Post-Procedure Width (cm) 1.5 cm 11/10/20 0933   Post-Procedure Depth (cm) 0.6 cm 11/10/20 0933   Post-Procedure Surface Area (cm^2) 3.15 cm^2 11/10/20 0933   Post-Procedure Volume (cm^3) 1.89 cm^3 11/10/20 0933   Wound Assessment Hyper granulation tissue;Fibrin 11/10/20 0933   Drainage Amount Scant 11/10/20 0933   Drainage Description Serosanguinous 11/10/20 0933   Odor None 11/10/20 0933   Trinh-wound Assessment Warm 11/10/20 0933   Margins Defined edges 11/10/20 0933   Wound Thickness Description not for Pressure Injury Full thickness 11/10/20 0933   Number of days: 14          Percent of Wound(s)/Ulcer(s) Debrided: 100%    Total Surface Area Debrided:  1 sq cm     Estimated Blood Loss: Minimal    Hemostasis Achieved:  by pressure    Procedural Pain:  1 / 10     Post Procedural Pain:  0 / 10     Response to treatment:  Well tolerated by patient. Plan: We will continue wet-to-dry dressings for twice a day and see her next week. We will also do 1 more week of Cipro. After that we may go to a silver alginate or possibly and/or Unna boot. cipro for 7 days bid reordered    Treatment Note please see attached Discharge Instructions    Written patient dismissal instructions given to patient and signed by patient or POA. Discharge Instructions       Follow up with Dr. Toshia Jenkins on 11/17/2020 at 10 AM for wound care. Continue wet to dry dressing to right lower leg. Change dressing twice a day. SIGNS OF INFECTION  - Redness, swelling, skin hot  - Wound bed turns black or stringy yellow  - Foul odor  - Increased drainage or pus  - Increased pain  - Fever greater than 100F    CALL YOUR DOCTOR OR SEEK MEDICAL ATTENTION IF SIGNS OF INFECTION.   DO NOT WAIT UNTIL YOUR NEXT APPOINTMENT    Call the Wound Care Nurse with any other questions or concerns- [phone]          Electronically signed by Filiberto Garcia MD on 11/10/2020 at 10:00 AM

## 2020-11-17 ENCOUNTER — HOSPITAL ENCOUNTER (OUTPATIENT)
Dept: WOUND CARE | Age: 65
Discharge: HOME | End: 2020-11-18
Payer: COMMERCIAL

## 2020-11-17 VITALS
HEART RATE: 80 BPM | RESPIRATION RATE: 18 BRPM | BODY MASS INDEX: 39.27 KG/M2 | SYSTOLIC BLOOD PRESSURE: 130 MMHG | DIASTOLIC BLOOD PRESSURE: 80 MMHG | HEIGHT: 64 IN | TEMPERATURE: 96.9 F | WEIGHT: 230 LBS

## 2020-11-17 PROCEDURE — 11042 DBRDMT SUBQ TIS 1ST 20SQCM/<: CPT | Performed by: SURGERY

## 2020-11-17 RX ORDER — GINSENG 100 MG
CAPSULE ORAL ONCE
Status: CANCELLED | OUTPATIENT
Start: 2020-11-17

## 2020-11-17 RX ORDER — LIDOCAINE 50 MG/G
OINTMENT TOPICAL ONCE
Status: CANCELLED | OUTPATIENT
Start: 2020-11-17

## 2020-11-17 RX ORDER — LIDOCAINE HYDROCHLORIDE 40 MG/ML
SOLUTION TOPICAL ONCE
Status: CANCELLED | OUTPATIENT
Start: 2020-11-17

## 2020-11-17 RX ORDER — BETAMETHASONE DIPROPIONATE 0.05 %
OINTMENT (GRAM) TOPICAL ONCE
Status: CANCELLED | OUTPATIENT
Start: 2020-11-17

## 2020-11-17 RX ORDER — CLOBETASOL PROPIONATE 0.5 MG/G
OINTMENT TOPICAL ONCE
Status: CANCELLED | OUTPATIENT
Start: 2020-11-17

## 2020-11-17 RX ORDER — BACITRACIN ZINC AND POLYMYXIN B SULFATE 500; 1000 [USP'U]/G; [USP'U]/G
OINTMENT TOPICAL ONCE
Status: CANCELLED | OUTPATIENT
Start: 2020-11-17

## 2020-11-17 RX ORDER — BACITRACIN, NEOMYCIN, POLYMYXIN B 400; 3.5; 5 [USP'U]/G; MG/G; [USP'U]/G
OINTMENT TOPICAL ONCE
Status: CANCELLED | OUTPATIENT
Start: 2020-11-17

## 2020-11-17 RX ORDER — LIDOCAINE HYDROCHLORIDE 20 MG/ML
JELLY TOPICAL ONCE
Status: CANCELLED | OUTPATIENT
Start: 2020-11-17

## 2020-11-17 RX ORDER — GENTAMICIN SULFATE 1 MG/G
OINTMENT TOPICAL ONCE
Status: CANCELLED | OUTPATIENT
Start: 2020-11-17

## 2020-11-17 ASSESSMENT — PAIN SCALES - GENERAL: PAINLEVEL_OUTOF10: 0

## 2020-11-17 NOTE — PROGRESS NOTES
[address]   Progress Note and Procedure Note      Sherwood Osborne Hand  MEDICAL RECORD NUMBER:  [id number]  AGE: 60 y.o. GENDER: female  : 1955  EPISODE DATE:  2020    Subjective:     Chief Complaint   Patient presents with    Wound Check     right leg         HISTORY of PRESENT ILLNESS HPI      2020  Patient is here for follow-up of her wound of the right leg from a hitting the . She states it feels better it is less painful and less swollen. She is currently doing wet-to-dry dressing twice a day. PAST MEDICAL HISTORY        Diagnosis Date    Adjustment disorder with mixed emotional features 2020    Anemia 2020    Anxiety 2020    Asthma 2009    Chronic airway obstruction (HCC)     Chronic idiopathic constipation     Chronic pain     Chronic sinusitis     Colon cancer (HCC)     Degeneration of lumbar intervertebral disc     Diastolic heart failure (HCC)     GERD (gastroesophageal reflux disease) 2006    Herpes simplex     Hyperlipidemia 2006    Hypertension     Iron deficiency anemia 2020    LAYTON (obstructive sleep apnea) 2017    Osteoarthritis     Sciatica     Thrombosed hemorrhoids        PAST SURGICAL HISTORY    History reviewed. No pertinent surgical history.     FAMILY HISTORY    Family History   Problem Relation Age of Onset    Heart Disease Mother     High Blood Pressure Mother     COPD Father     Heart Disease Father     High Blood Pressure Father     Cancer Father         lung       SOCIAL HISTORY    Social History     Tobacco Use    Smoking status: Former Smoker     Packs/day: 1.00     Types: Cigarettes     Start date: 7/10/1970     Last attempt to quit: 7/10/1984     Years since quittin.3    Smokeless tobacco: Never Used    Tobacco comment: quit cold turkey   Substance Use Topics    Alcohol use: Yes     Frequency: Monthly or less     Drinks per session: 1 or 2 Binge frequency: Never    Drug use: Never       ALLERGIES    Allergies   Allergen Reactions    Amitriptyline     Amoxicillin-Pot Clavulanate     Aspirin     Capsaicin-Menthol-Methyl Sal     Eggs Or Egg-Derived Products     Morphine     Nitrofurantoin Monohyd Macro     Nsaids        MEDICATIONS    Current Outpatient Medications on File Prior to Encounter   Medication Sig Dispense Refill    potassium chloride (KLOR-CON M) 10 MEQ extended release tablet TAKE ONE TABLET BY MOUTH ONCE DAILY      lansoprazole (PREVACID) 30 MG delayed release capsule TAKE ONE CAPSULE BY MOUTH ONCE DAILY      ciprofloxacin (CIPRO) 500 MG tablet Take 1 tablet by mouth 2 times daily for 7 days 14 tablet 0    ciprofloxacin (CIPRO) 500 MG tablet Take 1 tablet by mouth 2 times daily 14 tablet 0    furosemide (LASIX) 40 MG tablet Take 40 mg by mouth daily      baclofen (LIORESAL) 20 MG tablet Take 1 tablet by mouth 4 times daily 60 tablet 2    buPROPion (WELLBUTRIN XL) 150 MG extended release tablet Take 150 mg by mouth every morning      ferrous sulfate (FE TABS 325) 325 (65 Fe) MG EC tablet Take 1 tablet by mouth 2 times daily 60 tablet 5    albuterol sulfate HFA (VENTOLIN HFA) 108 (90 Base) MCG/ACT inhaler Inhale 2 puffs into the lungs every 6 hours as needed for Wheezing       albuterol (PROVENTIL) (2.5 MG/3ML) 0.083% nebulizer solution Take 2.5 mg by nebulization 3 times daily      ALPRAZolam (XANAX) 0.25 MG tablet Take 0.25 mg by mouth as needed for Sleep.       colestipol (COLESTID) 1 g tablet Take 1 g by mouth 2 times daily Take 2 tablets by mouth two times a day      fluticasone-salmeterol (ADVAIR DISKUS) 100-50 MCG/DOSE diskus inhaler Inhale 1 puff into the lungs every 12 hours      lisinopril (PRINIVIL;ZESTRIL) 10 MG tablet Take 10 mg by mouth daily      loratadine (CLARITIN) 10 MG capsule Take 10 mg by mouth daily      omeprazole (PRILOSEC) 40 MG delayed release capsule Take 40 mg by mouth daily      potassium chloride (MICRO-K) 10 MEQ extended release capsule Take 10 mEq by mouth daily      azelastine (ASTELIN) 0.1 % nasal spray 1 spray by Nasal route 2 times daily Use in each nostril as directed      fluticasone (FLONASE) 50 MCG/ACT nasal spray 2 sprays by Each Nostril route daily       No current facility-administered medications on file prior to encounter. Objective:      /80   Pulse 80   Temp 96.9 °F (36.1 °C) (Tympanic)   Resp 18   Ht 5' 4\" (1.626 m)   Wt 230 lb (104.3 kg)   BMI 39.48 kg/m²     Wt Readings from Last 3 Encounters:   11/17/20 230 lb (104.3 kg)   11/10/20 232 lb (105.2 kg)   11/03/20 227 lb (103 kg)       PHYSICAL EXAM  Physical Exam  The wound looks excellent. Is less edema around the surrounding wound there is no redness. The wound bed is getting more granulation tissue. However there is tunneling posterior from the subcutaneous tissue that has closed and liquefied. It goes tunneling about a centimeter. Assessment:        Problem List Items Addressed This Visit     Wound of right leg - Primary           Procedure Note  Indications:  Based on my examination of this patient's wound(s)/ulcer(s) today, debridement is required to promote healing and evaluate the wound base. Performed by: Ivette Jones MD    Consent obtained:  Yes    Time out taken:  Yes    Pain Control: Anesthetic  Anesthetic: 4% Lidocaine Liquid Topical       Debridement: Excisional Debridement    Using curette the wound(s)/ulcer(s) was/were debrided down through and including the removal of subcutaneous tissue. Devitalized Tissue Debrided:  slough and necrotic/eschar    Pre Debridement Measurements:  Are located in the [place]  Documentation Flow Sheet    Wound/Ulcer #: 1    Post Debridement Measurements:  Wound/Ulcer Descriptions are Pre Debridement except measurements:    Wound 10/27/20 Leg Right; Lower (Active)   Wound Image   11/17/20 1631   Wound Etiology Traumatic 11/17/20 1766 Dressing Status Old drainage noted 11/17/20 0952   Wound Cleansed Cleansed with saline 11/17/20 0952   Dressing/Treatment Moist to dry 11/03/20 0854   Offloading for Diabetic Foot Ulcers No 11/17/20 0952   Dressing Change Due 11/05/20 11/03/20 0854   Wound Length (cm) 2 cm 11/17/20 0952   Wound Width (cm) 1.3 cm 11/17/20 0952   Wound Depth (cm) 0.5 cm 11/17/20 0952   Wound Surface Area (cm^2) 2.6 cm^2 11/17/20 0952   Change in Wound Size % (l*w) -381.48 11/17/20 0952   Wound Volume (cm^3) 1.3 cm^3 11/17/20 0952   Wound Healing % -381 11/17/20 0952   Post-Procedure Length (cm) 2.1 cm 11/10/20 0933   Post-Procedure Width (cm) 1.5 cm 11/10/20 0933   Post-Procedure Depth (cm) 0.6 cm 11/10/20 0933   Post-Procedure Surface Area (cm^2) 3.15 cm^2 11/10/20 0933   Post-Procedure Volume (cm^3) 1.89 cm^3 11/10/20 0933   Wound Assessment Hyper granulation tissue;Fibrin 11/17/20 0952   Drainage Amount Scant 11/17/20 0952   Drainage Description Serosanguinous 11/17/20 0952   Odor None 11/17/20 0952   Trinh-wound Assessment Warm 11/17/20 0952   Margins Defined edges 11/17/20 0952   Wound Thickness Description not for Pressure Injury Full thickness 11/17/20 0952   Number of days: 21          Percent of Wound(s)/Ulcer(s) Debrided: 100%    Total Surface Area Debrided:  2 sq cm     Estimated Blood Loss:  Minimal    Hemostasis Achieved:  by pressure    Procedural Pain:  2  / 10     Post Procedural Pain:  0 / 10     Response to treatment:  Well tolerated by patient. Plan:     New Prescriptions    No medications on file     We will continue wet-to-dry dressings for the next week. Debride the posterior tunnel. The next week we will consider O snap VAC. She is done with the Cipro we will not put her back on that at this point. Treatment Note please see attached Discharge Instructions    Written patient dismissal instructions given to patient and signed by patient or POA.              Electronically signed by Blanca Roger MD on

## 2020-11-23 ENCOUNTER — TELEPHONE (OUTPATIENT)
Dept: WOUND CARE | Age: 65
End: 2020-11-23

## 2020-11-23 NOTE — TELEPHONE ENCOUNTER
I called patient back. She will continue wet to dry dressing changes. She states that she was told that she has to quarantine 10 days after her symptoms began. She states that she can come in on Wed. 12/02/20. When do you want to see her?

## 2020-11-23 NOTE — TELEPHONE ENCOUNTER
Patient canceled wound care appointment with Dr. Gareth Parker on 11/24 because she tested positive for COVID-19. Patient is in quarantine until 12/1. Patient has concerns about waiting for the wound vac. Please call her back at [phone].

## 2020-11-24 ENCOUNTER — E-VISIT (OUTPATIENT)
Dept: FAMILY MEDICINE CLINIC | Age: 65
End: 2020-11-24

## 2020-11-24 ASSESSMENT — LIFESTYLE VARIABLES
SMOKING_YEARS: 41
SMOKING_STATUS: NO, BUT I USED TO SMOKE
PACKS_PER_DAY: 1

## 2020-12-08 ENCOUNTER — HOSPITAL ENCOUNTER (OUTPATIENT)
Dept: WOUND CARE | Age: 65
Discharge: HOME | End: 2020-12-09
Payer: COMMERCIAL

## 2020-12-08 VITALS
WEIGHT: 230 LBS | SYSTOLIC BLOOD PRESSURE: 128 MMHG | HEART RATE: 97 BPM | RESPIRATION RATE: 16 BRPM | DIASTOLIC BLOOD PRESSURE: 80 MMHG | BODY MASS INDEX: 39.27 KG/M2 | TEMPERATURE: 97.3 F | HEIGHT: 64 IN

## 2020-12-08 PROCEDURE — 11042 DBRDMT SUBQ TIS 1ST 20SQCM/<: CPT | Performed by: SURGERY

## 2020-12-08 RX ORDER — BACITRACIN, NEOMYCIN, POLYMYXIN B 400; 3.5; 5 [USP'U]/G; MG/G; [USP'U]/G
OINTMENT TOPICAL ONCE
Status: CANCELLED | OUTPATIENT
Start: 2020-12-08

## 2020-12-08 RX ORDER — BETAMETHASONE DIPROPIONATE 0.05 %
OINTMENT (GRAM) TOPICAL ONCE
Status: CANCELLED | OUTPATIENT
Start: 2020-12-08

## 2020-12-08 RX ORDER — IPRATROPIUM BROMIDE AND ALBUTEROL SULFATE 2.5; .5 MG/3ML; MG/3ML
SOLUTION RESPIRATORY (INHALATION)
COMMUNITY
Start: 2020-11-18

## 2020-12-08 RX ORDER — LIDOCAINE 50 MG/G
OINTMENT TOPICAL ONCE
Status: CANCELLED | OUTPATIENT
Start: 2020-12-08

## 2020-12-08 RX ORDER — LIDOCAINE HYDROCHLORIDE 20 MG/ML
JELLY TOPICAL ONCE
Status: CANCELLED | OUTPATIENT
Start: 2020-12-08

## 2020-12-08 RX ORDER — GENTAMICIN SULFATE 1 MG/G
OINTMENT TOPICAL ONCE
Status: CANCELLED | OUTPATIENT
Start: 2020-12-08

## 2020-12-08 RX ORDER — LISINOPRIL 30 MG/1
TABLET ORAL
COMMUNITY
Start: 2020-11-17

## 2020-12-08 RX ORDER — BACITRACIN ZINC AND POLYMYXIN B SULFATE 500; 1000 [USP'U]/G; [USP'U]/G
OINTMENT TOPICAL ONCE
Status: CANCELLED | OUTPATIENT
Start: 2020-12-08

## 2020-12-08 RX ORDER — LIDOCAINE HYDROCHLORIDE 40 MG/ML
SOLUTION TOPICAL ONCE
Status: CANCELLED | OUTPATIENT
Start: 2020-12-08

## 2020-12-08 RX ORDER — DOXYCYCLINE HYCLATE 100 MG/1
100 CAPSULE ORAL 2 TIMES DAILY
Qty: 20 CAPSULE | Refills: 0 | Status: SHIPPED | OUTPATIENT
Start: 2020-12-08 | End: 2020-12-18

## 2020-12-08 RX ORDER — ALBUTEROL SULFATE 90 UG/1
AEROSOL, METERED RESPIRATORY (INHALATION)
COMMUNITY
Start: 2020-11-30 | End: 2021-01-11 | Stop reason: SDUPTHER

## 2020-12-08 RX ORDER — BENZONATATE 100 MG/1
100 CAPSULE ORAL 3 TIMES DAILY PRN
COMMUNITY
Start: 2020-11-30 | End: 2020-12-10

## 2020-12-08 RX ORDER — GINSENG 100 MG
CAPSULE ORAL ONCE
Status: CANCELLED | OUTPATIENT
Start: 2020-12-08

## 2020-12-08 RX ORDER — CLOBETASOL PROPIONATE 0.5 MG/G
OINTMENT TOPICAL ONCE
Status: CANCELLED | OUTPATIENT
Start: 2020-12-08

## 2020-12-08 NOTE — PROGRESS NOTES
[address]   Progress Note and Procedure Note      Caryle Avena Hand  MEDICAL RECORD NUMBER:  [id number]  AGE: 60 y.o. GENDER: female  : 1955  EPISODE DATE:  2020    Subjective:     Chief Complaint   Patient presents with    Wound Check     right shin         HISTORY of PRESENT ILLNESS HPI  Patient is 61-year-old here for a wound check on her right leg. This is a traumatic wound that she had a . It was getting better and the swelling was down in her leg last time we saw her. Last time we saw her was on . Since that time she has contracted Covid 19 and has been at home treated with that. She is passed her quarantine today. She is here to check her wound on her leg. PAST MEDICAL HISTORY        Diagnosis Date    Adjustment disorder with mixed emotional features 2020    Anemia 2020    Anxiety 2020    Asthma 2009    Chronic airway obstruction (HCC)     Chronic idiopathic constipation     Chronic pain     Chronic sinusitis     Colon cancer (HCC)     Degeneration of lumbar intervertebral disc     Diastolic heart failure (HCC)     GERD (gastroesophageal reflux disease) 2006    Herpes simplex     Hyperlipidemia 2006    Hypertension     Iron deficiency anemia 2020    LAYTON (obstructive sleep apnea) 2017    Osteoarthritis     Sciatica     Thrombosed hemorrhoids        PAST SURGICAL HISTORY    No past surgical history on file.     FAMILY HISTORY    Family History   Problem Relation Age of Onset    Heart Disease Mother     High Blood Pressure Mother     COPD Father     Heart Disease Father     High Blood Pressure Father     Cancer Father         lung       SOCIAL HISTORY    Social History     Tobacco Use    Smoking status: Former Smoker     Packs/day: 1.00     Types: Cigarettes     Start date: 7/10/1970     Last attempt to quit: 7/10/1984     Years since quittin.4    Smokeless tobacco: Never Used    Tobacco comment: quit cold turkey   Substance Use Topics    Alcohol use: Yes     Frequency: Monthly or less     Drinks per session: 1 or 2     Binge frequency: Never    Drug use: Never       ALLERGIES    Allergies   Allergen Reactions    Amitriptyline     Amoxicillin-Pot Clavulanate     Aspirin     Capsaicin-Menthol-Methyl Sal     Eggs Or Egg-Derived Products     Morphine     Nitrofurantoin Monohyd Macro     Nsaids        MEDICATIONS    Current Outpatient Medications on File Prior to Encounter   Medication Sig Dispense Refill    benzonatate (TESSALON) 100 MG capsule Take 100 mg by mouth 3 times daily as needed      albuterol sulfate  (90 Base) MCG/ACT inhaler ProAir HFA 90 mcg/actuation aerosol inhaler   INHALE 2 PUFFS BY MOUTH INTO LUNGS EVERY 4 HOURS AS NEEDED      fluticasone-salmeterol (ADVAIR DISKUS) 100-50 MCG/DOSE diskus inhaler Inhale 1 puff into the lungs 2 times daily      ipratropium-albuterol (DUONEB) 0.5-2.5 (3) MG/3ML SOLN nebulizer solution INHALE CONTENTS OF 1 VIAL BY NEBULIZAITON EVERY 6 HOURS AS NEEDED FOR WHEEZING OR SHORTNESS OF BREATH      lisinopril (PRINIVIL;ZESTRIL) 30 MG tablet TAKE ONE TABLET ONCE DAILY      potassium chloride (KLOR-CON M) 10 MEQ extended release tablet TAKE ONE TABLET BY MOUTH ONCE DAILY      lansoprazole (PREVACID) 30 MG delayed release capsule TAKE ONE CAPSULE BY MOUTH ONCE DAILY      ciprofloxacin (CIPRO) 500 MG tablet Take 1 tablet by mouth 2 times daily 14 tablet 0    furosemide (LASIX) 40 MG tablet Take 40 mg by mouth daily      baclofen (LIORESAL) 20 MG tablet Take 1 tablet by mouth 4 times daily 60 tablet 2    buPROPion (WELLBUTRIN XL) 150 MG extended release tablet Take 150 mg by mouth every morning      ferrous sulfate (FE TABS 325) 325 (65 Fe) MG EC tablet Take 1 tablet by mouth 2 times daily 60 tablet 5    albuterol sulfate HFA (VENTOLIN HFA) 108 (90 Base) MCG/ACT inhaler Inhale 2 puffs into the lungs every 6 hours as needed for Wheezing       albuterol (PROVENTIL) (2.5 MG/3ML) 0.083% nebulizer solution Take 2.5 mg by nebulization 3 times daily      ALPRAZolam (XANAX) 0.25 MG tablet Take 0.25 mg by mouth as needed for Sleep.  colestipol (COLESTID) 1 g tablet Take 1 g by mouth 2 times daily Take 2 tablets by mouth two times a day      fluticasone-salmeterol (ADVAIR DISKUS) 100-50 MCG/DOSE diskus inhaler Inhale 1 puff into the lungs every 12 hours      lisinopril (PRINIVIL;ZESTRIL) 10 MG tablet Take 10 mg by mouth daily      loratadine (CLARITIN) 10 MG capsule Take 10 mg by mouth daily      omeprazole (PRILOSEC) 40 MG delayed release capsule Take 40 mg by mouth daily      potassium chloride (MICRO-K) 10 MEQ extended release capsule Take 10 mEq by mouth daily      azelastine (ASTELIN) 0.1 % nasal spray 1 spray by Nasal route 2 times daily Use in each nostril as directed      fluticasone (FLONASE) 50 MCG/ACT nasal spray 2 sprays by Each Nostril route daily       No current facility-administered medications on file prior to encounter. Objective:      /80   Pulse 97   Temp 97.3 °F (36.3 °C)     Wt Readings from Last 3 Encounters:   11/17/20 230 lb (104.3 kg)   11/10/20 232 lb (105.2 kg)   11/03/20 227 lb (103 kg)       PHYSICAL EXAM  Physical Exam        There is some increased edema of the leg as well as some increased erythema around the wound. Consistent with a mild cellulitis. The wound bed has some fibrinoid slough in the base. Assessment:    1. Traumatic ulcer to the right leg with some edema and now mild cellulitis. This is probably due to the inactivity with the Covid. We will continue to treat with antibiotic and silver alginate and Unna boot. We will change it next week and then apply for pure apply the following week.     Problem List Items Addressed This Visit     Wound of right leg - Primary           Procedure Note  Indications:  Based on my examination of this patient's wound(s)/ulcer(s) today, debridement is required to promote healing and evaluate the wound base. Performed by: Amalia Oden MD    Consent obtained:  Yes    Time out taken:  Yes    Pain Control:         Debridement: Excisional Debridement    Using curette the wound(s)/ulcer(s) was/were debrided down through and including the removal of dermis. And SQ       Devitalized Tissue Debrided:  slough    Pre Debridement Measurements:  Are located in the [facility]  Documentation Flow Sheet    Wound/Ulcer #: 1    Post Debridement Measurements:  Wound/Ulcer Descriptions are Pre Debridement except measurements:    Wound 10/27/20 Leg Right; Lower (Active)   Wound Image   11/17/20 0952   Wound Etiology Traumatic 11/17/20 0952   Dressing Status Old drainage noted 11/17/20 0952   Wound Cleansed Cleansed with saline 11/17/20 0952   Dressing/Treatment Moist to dry 11/17/20 0952   Offloading for Diabetic Foot Ulcers No 11/17/20 0952   Dressing Change Due 11/18/20 11/17/20 0952   Wound Length (cm) 1.6 cm 12/08/20 1002   Wound Width (cm) 1.1 cm 12/08/20 1002   Wound Depth (cm) 0.4 cm 12/08/20 1002   Wound Surface Area (cm^2) 1.76 cm^2 12/08/20 1002   Change in Wound Size % (l*w) -225.93 12/08/20 1002   Wound Volume (cm^3) 0.7 cm^3 12/08/20 1002   Wound Healing % -159 12/08/20 1002   Post-Procedure Length (cm) 2 cm 11/17/20 0952   Post-Procedure Width (cm) 1.4 cm 11/17/20 0952   Post-Procedure Depth (cm) 0.6 cm 11/10/20 0933   Post-Procedure Surface Area (cm^2) 2.8 cm^2 11/17/20 0952   Post-Procedure Volume (cm^3) 1.89 cm^3 11/10/20 0933   Undermining Starts ___ O'Clock 7 11/17/20 0952   Undermining Ends___ O'Clock 11 11/17/20 0952   Undermining Maxium Distance (cm) 2 11/17/20 0952   Wound Assessment Hyper granulation tissue;Fibrin 11/17/20 0952   Drainage Amount Scant 11/17/20 0952   Drainage Description Serosanguinous 11/17/20 0952   Odor None 11/17/20 0952   Trinh-wound Assessment Warm 11/17/20 0952   Margins Defined edges 11/17/20 4918   Wound Thickness Description not for Pressure Injury Full thickness 11/17/20 0952   Number of days: 42          Percent of Wound(s)/Ulcer(s) Debrided: 100%    Total Surface Area Debrided:  2 sq cm     Estimated Blood Loss:  Minimal    Hemostasis Achieved:  by silver nitrate stick    Procedural Pain:  1  / 10     Post Procedural Pain:  1 / 10     Response to treatment:  Well tolerated by patient. Plan:     1. We will put the patient on doxycycline for 10 days  2. We will apply for the pure applied application for next week  3. We will put alginate in the wound and cover with a Mepilex and use an Unna boot on the leg to help with the edema. 4.  We will have the patient stop in next week for a wound care visit with the nurse to change the Unna boot and I will see her in 2 weeks  5. Continue to follow her Covid status with her primary care physician    New Prescriptions    DOXYCYCLINE HYCLATE (VIBRAMYCIN) 100 MG CAPSULE    Take 1 capsule by mouth 2 times daily for 10 days       Treatment Note please see attached Discharge Instructions    Written patient dismissal instructions given to patient and signed by patient or POA.              Electronically signed by Drea Doyle MD on 12/8/2020 at 10:54 AM

## 2020-12-08 NOTE — PLAN OF CARE
Problem: Wound:  Goal: Will show signs of wound healing; wound closure and no evidence of infection  Description: Will show signs of wound healing; wound closure and no evidence of infection  Outcome: Ongoing     Problem: Venous:  Goal: Signs of wound healing will improve  Description: Signs of wound healing will improve  Outcome: Ongoing     Problem: Compression therapy:  Goal: Will be free from complications associated with compression therapy  Description: Will be free from complications associated with compression therapy  Outcome: Ongoing     Problem: Weight control:  Goal: Ability to maintain an optimal weight for height and age will be supported  Description: Ability to maintain an optimal weight for height and age will be supported  Outcome: Ongoing     Problem: Falls - Risk of:  Goal: Will remain free from falls  Description: Will remain free from falls  Outcome: Ongoing

## 2020-12-15 ENCOUNTER — HOSPITAL ENCOUNTER (OUTPATIENT)
Dept: WOUND CARE | Age: 65
Discharge: HOME | End: 2020-12-16
Payer: COMMERCIAL

## 2020-12-15 VITALS
DIASTOLIC BLOOD PRESSURE: 82 MMHG | RESPIRATION RATE: 16 BRPM | HEART RATE: 88 BPM | TEMPERATURE: 97.9 F | HEIGHT: 64 IN | BODY MASS INDEX: 39.09 KG/M2 | WEIGHT: 229 LBS | SYSTOLIC BLOOD PRESSURE: 126 MMHG

## 2020-12-15 ASSESSMENT — PAIN DESCRIPTION - LOCATION: LOCATION: LEG

## 2020-12-15 ASSESSMENT — PAIN DESCRIPTION - FREQUENCY: FREQUENCY: CONTINUOUS

## 2020-12-15 ASSESSMENT — PAIN SCALES - GENERAL: PAINLEVEL_OUTOF10: 5

## 2020-12-15 ASSESSMENT — PAIN DESCRIPTION - ORIENTATION: ORIENTATION: RIGHT

## 2020-12-15 ASSESSMENT — PAIN DESCRIPTION - PAIN TYPE: TYPE: ACUTE PAIN

## 2020-12-15 ASSESSMENT — PAIN DESCRIPTION - ONSET: ONSET: GRADUAL

## 2020-12-15 ASSESSMENT — PAIN DESCRIPTION - DESCRIPTORS: DESCRIPTORS: ACHING

## 2020-12-15 NOTE — PROGRESS NOTES
Patient presented for dressing change per order of Dr. David Rico. Old dressing removed. Periwound and ulcer(s) cleansed. New dressing (silver AG and unna boot) applied to right leg. Right leg wound       Unna Boot Application   Below Knee    NAME:  Thu Kaur  YOB: 1955  MEDICAL RECORD NUMBER:  [id number]  DATE:  12/15/2020    Megan Avina boot: Applied moisturizing agent to dry skin as needed. Appied primary and secondary dressing as ordered. Applied Unna roll from toes to knee overlapping each time. Applied ace wrap or coban from toes to below the knee. Instructed patient/caregiver to keep dressing dry and intact. DO NOT REMOVE DRESSING. Instructed pt/family/caregiver to report excessive draining, loose bandage, wet dressing, severe pain or tingling in toes. Applied [place] dressing below the knee to right lower leg. Unna Boot(s) were applied per  Guidelines.      Electronically signed by Nathaly Snider RN on 12/15/2020 at 10:57 AM

## 2020-12-15 NOTE — PLAN OF CARE
Problem: Pain:  Goal: Pain level will decrease  Description: Pain level will decrease  Outcome: Ongoing  Goal: Control of acute pain  Description: Control of acute pain  Outcome: Ongoing  Goal: Control of chronic pain  Description: Control of chronic pain  Outcome: Ongoing     Problem: Wound:  Goal: Will show signs of wound healing; wound closure and no evidence of infection  Description: Will show signs of wound healing; wound closure and no evidence of infection  Outcome: Ongoing     Problem: Compression therapy:  Goal: Will be free from complications associated with compression therapy  Description: Will be free from complications associated with compression therapy  Outcome: Ongoing     Problem: Weight control:  Goal: Ability to maintain an optimal weight for height and age will be supported  Description: Ability to maintain an optimal weight for height and age will be supported  Outcome: Ongoing     Problem: Falls - Risk of:  Goal: Will remain free from falls  Description: Will remain free from falls  Outcome: Ongoing

## 2020-12-22 ENCOUNTER — HOSPITAL ENCOUNTER (OUTPATIENT)
Dept: WOUND CARE | Age: 65
Discharge: HOME | End: 2020-12-23
Payer: COMMERCIAL

## 2020-12-22 VITALS
WEIGHT: 222 LBS | DIASTOLIC BLOOD PRESSURE: 80 MMHG | HEIGHT: 64 IN | SYSTOLIC BLOOD PRESSURE: 136 MMHG | TEMPERATURE: 98.1 F | BODY MASS INDEX: 37.9 KG/M2 | RESPIRATION RATE: 20 BRPM | HEART RATE: 92 BPM

## 2020-12-22 PROCEDURE — 97597 DBRDMT OPN WND 1ST 20 CM/<: CPT | Performed by: SURGERY

## 2020-12-22 RX ORDER — GENTAMICIN SULFATE 1 MG/G
OINTMENT TOPICAL ONCE
Status: CANCELLED | OUTPATIENT
Start: 2020-12-22 | End: 2020-12-22

## 2020-12-22 RX ORDER — LIDOCAINE HYDROCHLORIDE 20 MG/ML
JELLY TOPICAL ONCE
Status: CANCELLED | OUTPATIENT
Start: 2020-12-22 | End: 2020-12-22

## 2020-12-22 RX ORDER — BETAMETHASONE DIPROPIONATE 0.05 %
OINTMENT (GRAM) TOPICAL ONCE
Status: CANCELLED | OUTPATIENT
Start: 2020-12-22 | End: 2020-12-22

## 2020-12-22 RX ORDER — LIDOCAINE HYDROCHLORIDE 40 MG/ML
SOLUTION TOPICAL ONCE
Status: CANCELLED | OUTPATIENT
Start: 2020-12-22 | End: 2020-12-22

## 2020-12-22 RX ORDER — LIDOCAINE 50 MG/G
OINTMENT TOPICAL ONCE
Status: CANCELLED | OUTPATIENT
Start: 2020-12-22 | End: 2020-12-22

## 2020-12-22 RX ORDER — GINSENG 100 MG
CAPSULE ORAL ONCE
Status: CANCELLED | OUTPATIENT
Start: 2020-12-22 | End: 2020-12-22

## 2020-12-22 RX ORDER — BACITRACIN, NEOMYCIN, POLYMYXIN B 400; 3.5; 5 [USP'U]/G; MG/G; [USP'U]/G
OINTMENT TOPICAL ONCE
Status: CANCELLED | OUTPATIENT
Start: 2020-12-22 | End: 2020-12-22

## 2020-12-22 RX ORDER — BACITRACIN ZINC AND POLYMYXIN B SULFATE 500; 1000 [USP'U]/G; [USP'U]/G
OINTMENT TOPICAL ONCE
Status: CANCELLED | OUTPATIENT
Start: 2020-12-22 | End: 2020-12-22

## 2020-12-22 RX ORDER — CLOBETASOL PROPIONATE 0.5 MG/G
OINTMENT TOPICAL ONCE
Status: CANCELLED | OUTPATIENT
Start: 2020-12-22 | End: 2020-12-22

## 2020-12-22 ASSESSMENT — PAIN SCALES - GENERAL: PAINLEVEL_OUTOF10: 0

## 2020-12-22 NOTE — PROGRESS NOTES
[place] Application   Below Knee    NAME:  Thu Kaur  YOB: 1955  MEDICAL RECORD NUMBER:  [id number]  DATE:  12/22/2020    Tim Dom boot: Applied moisturizing agent to dry skin as needed. Appied primary and secondary dressing as ordered. Applied Unna roll from toes to knee overlapping each time. Applied ace wrap or coban from toes to below the knee. Instructed patient/caregiver to keep dressing dry and intact. DO NOT REMOVE DRESSING. Instructed pt/family/caregiver to report excessive draining, loose bandage, wet dressing, severe pain or tingling in toes. Applied [place] dressing below the knee to right lower leg. Unna Boot(s) were applied per  Guidelines.      Electronically signed by Mimi Davila RN on 12/22/2020 at 10:12 AM

## 2020-12-22 NOTE — ADDENDUM NOTE
Encounter addended by: Mariella Griffiths RN on: 12/22/2020 10:22 AM   Actions taken: Charge Capture section accepted

## 2020-12-22 NOTE — PROGRESS NOTES
[address]   Progress Note and Procedure Note      David Kaur  MEDICAL RECORD NUMBER:  [id number]  AGE: 60 y.o. GENDER: female  : 1955  EPISODE DATE:  2020    Subjective:     Chief Complaint   Patient presents with    Wound Check     right lower leg         HISTORY of PRESENT ILLNESS HPI    Patient is here for a right lower leg wound. She had an injury on the . It is healing nicely. Been previously using alginate foam dressing and a Unna boot. We had thought about pure apply but she did not get qualified. PAST MEDICAL HISTORY        Diagnosis Date    Adjustment disorder with mixed emotional features 2020    Anemia 2020    Anxiety 2020    Asthma 2009    Chronic airway obstruction (HCC)     Chronic idiopathic constipation     Chronic pain     Chronic sinusitis     Colon cancer (HCC)     Degeneration of lumbar intervertebral disc     Diastolic heart failure (HCC)     GERD (gastroesophageal reflux disease) 2006    Herpes simplex     Hyperlipidemia 2006    Hypertension     Iron deficiency anemia 2020    LAYTON (obstructive sleep apnea) 2017    Osteoarthritis     Sciatica     Thrombosed hemorrhoids        PAST SURGICAL HISTORY    History reviewed. No pertinent surgical history.     FAMILY HISTORY    Family History   Problem Relation Age of Onset    Heart Disease Mother     High Blood Pressure Mother     COPD Father     Heart Disease Father     High Blood Pressure Father     Cancer Father         lung       SOCIAL HISTORY    Social History     Tobacco Use    Smoking status: Former Smoker     Packs/day: 1.00     Types: Cigarettes     Start date: 7/10/1970     Quit date: 7/10/1984     Years since quittin.4    Smokeless tobacco: Never Used    Tobacco comment: quit cold turkey   Substance Use Topics    Alcohol use: Yes     Frequency: Monthly or less     Drinks per session: 1 or 2 Binge frequency: Never    Drug use: Never       ALLERGIES    Allergies   Allergen Reactions    Amitriptyline     Amoxicillin-Pot Clavulanate     Aspirin     Capsaicin-Menthol-Methyl Sal     Eggs Or Egg-Derived Products     Morphine     Nitrofurantoin Monohyd Macro     Nsaids        MEDICATIONS    Current Outpatient Medications on File Prior to Encounter   Medication Sig Dispense Refill    ipratropium-albuterol (DUONEB) 0.5-2.5 (3) MG/3ML SOLN nebulizer solution INHALE CONTENTS OF 1 VIAL BY NEBULIZAITON EVERY 6 HOURS AS NEEDED FOR WHEEZING OR SHORTNESS OF BREATH      lisinopril (PRINIVIL;ZESTRIL) 30 MG tablet TAKE ONE TABLET ONCE DAILY      albuterol sulfate  (90 Base) MCG/ACT inhaler ProAir HFA 90 mcg/actuation aerosol inhaler   INHALE 2 PUFFS BY MOUTH INTO LUNGS EVERY 4 HOURS AS NEEDED      fluticasone-salmeterol (ADVAIR DISKUS) 100-50 MCG/DOSE diskus inhaler Inhale 1 puff into the lungs 2 times daily      potassium chloride (KLOR-CON M) 10 MEQ extended release tablet TAKE ONE TABLET BY MOUTH ONCE DAILY      lansoprazole (PREVACID) 30 MG delayed release capsule TAKE ONE CAPSULE BY MOUTH ONCE DAILY      ciprofloxacin (CIPRO) 500 MG tablet Take 1 tablet by mouth 2 times daily 14 tablet 0    furosemide (LASIX) 40 MG tablet Take 40 mg by mouth daily      baclofen (LIORESAL) 20 MG tablet Take 1 tablet by mouth 4 times daily 60 tablet 2    buPROPion (WELLBUTRIN XL) 150 MG extended release tablet Take 150 mg by mouth every morning      ferrous sulfate (FE TABS 325) 325 (65 Fe) MG EC tablet Take 1 tablet by mouth 2 times daily 60 tablet 5    albuterol sulfate HFA (VENTOLIN HFA) 108 (90 Base) MCG/ACT inhaler Inhale 2 puffs into the lungs every 6 hours as needed for Wheezing       albuterol (PROVENTIL) (2.5 MG/3ML) 0.083% nebulizer solution Take 2.5 mg by nebulization 3 times daily      ALPRAZolam (XANAX) 0.25 MG tablet Take 0.25 mg by mouth as needed for Sleep.       colestipol (COLESTID) 1 g tablet Take 1 g by mouth 2 times daily Take 2 tablets by mouth two times a day      fluticasone-salmeterol (ADVAIR DISKUS) 100-50 MCG/DOSE diskus inhaler Inhale 1 puff into the lungs every 12 hours      lisinopril (PRINIVIL;ZESTRIL) 10 MG tablet Take 10 mg by mouth daily      loratadine (CLARITIN) 10 MG capsule Take 10 mg by mouth daily      omeprazole (PRILOSEC) 40 MG delayed release capsule Take 40 mg by mouth daily      potassium chloride (MICRO-K) 10 MEQ extended release capsule Take 10 mEq by mouth daily      azelastine (ASTELIN) 0.1 % nasal spray 1 spray by Nasal route 2 times daily Use in each nostril as directed      fluticasone (FLONASE) 50 MCG/ACT nasal spray 2 sprays by Each Nostril route daily       No current facility-administered medications on file prior to encounter. Objective:      /80   Pulse 92   Temp 98.1 °F (36.7 °C) (Tympanic)   Resp 20   Ht 5' 4\" (1.626 m)   Wt 222 lb (100.7 kg)   BMI 38.11 kg/m²     Wt Readings from Last 3 Encounters:   12/22/20 222 lb (100.7 kg)   12/15/20 229 lb (103.9 kg)   12/08/20 230 lb (104.3 kg)       PHYSICAL EXAM  Physical Exam        The wound is mostly healed. There is still some depression of the wound but there is epithelialization over 80% of the wound. 20% on the posterior aspect of it has a little bit of hyper granulation tissue. This was treated with a curette and silver nitrate. The rest of the leg looks very good and the edema is less      Assessment:        Problem List Items Addressed This Visit     Wound of right leg - Primary           Procedure Note  Indications:  Based on my examination of this patient's wound(s)/ulcer(s) today, debridement is required to promote healing and evaluate the wound base.     Performed by: Yasmine Sigala MD    Consent obtained:  Yes    Time out taken:  Yes    Pain Control:         Debridement: Excisional Debridement    Using curette the wound(s)/ulcer(s) was/were debrided down through and including the removal of dermis. Devitalized Tissue Debrided:  biofilm    Pre Debridement Measurements:  Are located in the [facility]  Documentation Flow Sheet    Wound/Ulcer #: 1    Post Debridement Measurements:  Wound/Ulcer Descriptions are Pre Debridement except measurements:    Wound 10/27/20 Leg Right; Lower (Active)   Wound Image   12/15/20 1055   Wound Etiology Traumatic 12/15/20 1055   Dressing Status Old drainage noted 12/15/20 1055   Wound Cleansed Cleansed with saline 12/15/20 1055   Dressing/Treatment Alginate with Ag 12/15/20 1055   Offloading for Diabetic Foot Ulcers No offloading required 12/15/20 1055   Dressing Change Due 12/22/20 12/15/20 1055   Wound Length (cm) 1.2 cm 12/15/20 1055   Wound Width (cm) 1 cm 12/15/20 1055   Wound Depth (cm) 0.3 cm 12/15/20 1055   Wound Surface Area (cm^2) 1.2 cm^2 12/15/20 1055   Change in Wound Size % (l*w) -122.22 12/15/20 1055   Wound Volume (cm^3) 0.36 cm^3 12/15/20 1055   Wound Healing % -33 12/15/20 1055   Post-Procedure Length (cm) 1.6 cm 12/08/20 1002   Post-Procedure Width (cm) 1.2 cm 12/08/20 1002   Post-Procedure Depth (cm) 0.4 cm 12/08/20 1002   Post-Procedure Surface Area (cm^2) 1.92 cm^2 12/08/20 1002   Post-Procedure Volume (cm^3) 0.77 cm^3 12/08/20 1002   Undermining Starts ___ O'Clock 7 11/17/20 0952   Undermining Ends___ O'Clock 11 11/17/20 0952   Undermining Maxium Distance (cm) 2 11/17/20 0952   Wound Assessment Granulation tissue 12/15/20 1055   Drainage Amount Moderate 12/15/20 1055   Drainage Description Serosanguinous 12/15/20 1055   Odor None 12/15/20 1055   Trinh-wound Assessment Warm 12/15/20 1055   Margins Defined edges 12/15/20 1055   Wound Thickness Description not for Pressure Injury Full thickness 12/15/20 1055   Number of days: 56          Percent of Wound(s)/Ulcer(s) Debrided: 20%    Total Surface Area Debrided:  1 sq cm     Estimated Blood Loss:  Minimal    Hemostasis Achieved:  by silver nitrate stick    Procedural Pain:  1 / 10     Post Procedural Pain:  0 / 10     Response to treatment:  Well tolerated by patient. Plan:     1. We will continue with a foam dressing over the wound and an Unna boot for 1 more week. It will probably be healed at that time. We will see her next week    New Prescriptions    No medications on file       Treatment Note please see attached Discharge Instructions    Written patient dismissal instructions given to patient and signed by patient or POA.              Electronically signed by Shell West MD on 12/22/2020 at 10:02 AM

## 2020-12-29 ENCOUNTER — HOSPITAL ENCOUNTER (OUTPATIENT)
Dept: WOUND CARE | Age: 65
Discharge: HOME | End: 2020-12-30
Payer: COMMERCIAL

## 2020-12-29 VITALS
BODY MASS INDEX: 37.73 KG/M2 | WEIGHT: 221 LBS | HEART RATE: 82 BPM | HEIGHT: 64 IN | DIASTOLIC BLOOD PRESSURE: 78 MMHG | TEMPERATURE: 96.6 F | SYSTOLIC BLOOD PRESSURE: 130 MMHG | RESPIRATION RATE: 18 BRPM

## 2020-12-29 PROCEDURE — 99212 OFFICE O/P EST SF 10 MIN: CPT | Performed by: SURGERY

## 2020-12-29 RX ORDER — GENTAMICIN SULFATE 1 MG/G
OINTMENT TOPICAL ONCE
Status: CANCELLED | OUTPATIENT
Start: 2020-12-29 | End: 2020-12-29

## 2020-12-29 RX ORDER — LIDOCAINE HYDROCHLORIDE 20 MG/ML
JELLY TOPICAL ONCE
Status: CANCELLED | OUTPATIENT
Start: 2020-12-29 | End: 2020-12-29

## 2020-12-29 RX ORDER — BACITRACIN, NEOMYCIN, POLYMYXIN B 400; 3.5; 5 [USP'U]/G; MG/G; [USP'U]/G
OINTMENT TOPICAL ONCE
Status: CANCELLED | OUTPATIENT
Start: 2020-12-29 | End: 2020-12-29

## 2020-12-29 RX ORDER — LIDOCAINE 50 MG/G
OINTMENT TOPICAL ONCE
Status: CANCELLED | OUTPATIENT
Start: 2020-12-29 | End: 2020-12-29

## 2020-12-29 RX ORDER — GINSENG 100 MG
CAPSULE ORAL ONCE
Status: CANCELLED | OUTPATIENT
Start: 2020-12-29 | End: 2020-12-29

## 2020-12-29 RX ORDER — CLOBETASOL PROPIONATE 0.5 MG/G
OINTMENT TOPICAL ONCE
Status: CANCELLED | OUTPATIENT
Start: 2020-12-29 | End: 2020-12-29

## 2020-12-29 RX ORDER — LIDOCAINE HYDROCHLORIDE 40 MG/ML
SOLUTION TOPICAL ONCE
Status: CANCELLED | OUTPATIENT
Start: 2020-12-29 | End: 2020-12-29

## 2020-12-29 RX ORDER — BETAMETHASONE DIPROPIONATE 0.05 %
OINTMENT (GRAM) TOPICAL ONCE
Status: CANCELLED | OUTPATIENT
Start: 2020-12-29 | End: 2020-12-29

## 2020-12-29 RX ORDER — BACITRACIN ZINC AND POLYMYXIN B SULFATE 500; 1000 [USP'U]/G; [USP'U]/G
OINTMENT TOPICAL ONCE
Status: CANCELLED | OUTPATIENT
Start: 2020-12-29 | End: 2020-12-29

## 2020-12-29 ASSESSMENT — PAIN SCALES - GENERAL: PAINLEVEL_OUTOF10: 0

## 2020-12-29 NOTE — PROGRESS NOTES
[address]   Progress Note and Procedure Note      Hanna Kaur  MEDICAL RECORD NUMBER:  [id number]  AGE: 60 y.o. GENDER: female  : 1955  EPISODE DATE:  2020    Subjective:     Chief Complaint   Patient presents with    Wound Check     right leg          HISTORY of PRESENT ILLNESS HPI  Patient is here for evaluation of wound on the right leg. She was healing very well and almost healed last visit. We continued with foam dressing and Unna boot for 1 more week. PAST MEDICAL HISTORY        Diagnosis Date    Adjustment disorder with mixed emotional features 2020    Anemia 2020    Anxiety 2020    Asthma 2009    Chronic airway obstruction (HCC)     Chronic idiopathic constipation     Chronic pain     Chronic sinusitis     Colon cancer (HCC)     Degeneration of lumbar intervertebral disc     Diastolic heart failure (HCC)     GERD (gastroesophageal reflux disease) 2006    Herpes simplex     Hyperlipidemia 2006    Hypertension     Iron deficiency anemia 2020    LAYTON (obstructive sleep apnea) 2017    Osteoarthritis     Sciatica     Thrombosed hemorrhoids        PAST SURGICAL HISTORY    History reviewed. No pertinent surgical history.     FAMILY HISTORY    Family History   Problem Relation Age of Onset    Heart Disease Mother     High Blood Pressure Mother     COPD Father     Heart Disease Father     High Blood Pressure Father     Cancer Father         lung       SOCIAL HISTORY    Social History     Tobacco Use    Smoking status: Former Smoker     Packs/day: 1.00     Types: Cigarettes     Start date: 7/10/1970     Quit date: 7/10/1984     Years since quittin.4    Smokeless tobacco: Never Used    Tobacco comment: quit cold turkey   Substance Use Topics    Alcohol use: Yes     Frequency: Monthly or less     Drinks per session: 1 or 2     Binge frequency: Never    Drug use: Never ALLERGIES    Allergies   Allergen Reactions    Amitriptyline     Amoxicillin-Pot Clavulanate     Aspirin     Capsaicin-Menthol-Methyl Sal     Eggs Or Egg-Derived Products     Morphine     Nitrofurantoin Monohyd Macro     Nsaids        MEDICATIONS    Current Outpatient Medications on File Prior to Encounter   Medication Sig Dispense Refill    ipratropium-albuterol (DUONEB) 0.5-2.5 (3) MG/3ML SOLN nebulizer solution INHALE CONTENTS OF 1 VIAL BY NEBULIZAITON EVERY 6 HOURS AS NEEDED FOR WHEEZING OR SHORTNESS OF BREATH      lisinopril (PRINIVIL;ZESTRIL) 30 MG tablet TAKE ONE TABLET ONCE DAILY      albuterol sulfate  (90 Base) MCG/ACT inhaler ProAir HFA 90 mcg/actuation aerosol inhaler   INHALE 2 PUFFS BY MOUTH INTO LUNGS EVERY 4 HOURS AS NEEDED      fluticasone-salmeterol (ADVAIR DISKUS) 100-50 MCG/DOSE diskus inhaler Inhale 1 puff into the lungs 2 times daily      potassium chloride (KLOR-CON M) 10 MEQ extended release tablet TAKE ONE TABLET BY MOUTH ONCE DAILY      lansoprazole (PREVACID) 30 MG delayed release capsule TAKE ONE CAPSULE BY MOUTH ONCE DAILY      ciprofloxacin (CIPRO) 500 MG tablet Take 1 tablet by mouth 2 times daily 14 tablet 0    furosemide (LASIX) 40 MG tablet Take 40 mg by mouth daily      baclofen (LIORESAL) 20 MG tablet Take 1 tablet by mouth 4 times daily 60 tablet 2    buPROPion (WELLBUTRIN XL) 150 MG extended release tablet Take 150 mg by mouth every morning      ferrous sulfate (FE TABS 325) 325 (65 Fe) MG EC tablet Take 1 tablet by mouth 2 times daily 60 tablet 5    albuterol sulfate HFA (VENTOLIN HFA) 108 (90 Base) MCG/ACT inhaler Inhale 2 puffs into the lungs every 6 hours as needed for Wheezing       albuterol (PROVENTIL) (2.5 MG/3ML) 0.083% nebulizer solution Take 2.5 mg by nebulization 3 times daily      ALPRAZolam (XANAX) 0.25 MG tablet Take 0.25 mg by mouth as needed for Sleep.       colestipol (COLESTID) 1 g tablet Take 1 g by mouth 2 times daily Take 2 tablets by mouth two times a day      fluticasone-salmeterol (ADVAIR DISKUS) 100-50 MCG/DOSE diskus inhaler Inhale 1 puff into the lungs every 12 hours      lisinopril (PRINIVIL;ZESTRIL) 10 MG tablet Take 10 mg by mouth daily      loratadine (CLARITIN) 10 MG capsule Take 10 mg by mouth daily      omeprazole (PRILOSEC) 40 MG delayed release capsule Take 40 mg by mouth daily      potassium chloride (MICRO-K) 10 MEQ extended release capsule Take 10 mEq by mouth daily      azelastine (ASTELIN) 0.1 % nasal spray 1 spray by Nasal route 2 times daily Use in each nostril as directed      fluticasone (FLONASE) 50 MCG/ACT nasal spray 2 sprays by Each Nostril route daily       No current facility-administered medications on file prior to encounter. Objective:      /78   Pulse 82   Temp 96.6 °F (35.9 °C) (Tympanic)   Resp 18   Ht 5' 4\" (1.626 m)   Wt 221 lb (100.2 kg)   BMI 37.93 kg/m²     Wt Readings from Last 3 Encounters:   12/29/20 221 lb (100.2 kg)   12/22/20 222 lb (100.7 kg)   12/15/20 229 lb (103.9 kg)       PHYSICAL EXAM  Physical Exam      The wound is epithelialized over there is a little divot which should fill out. But overall it is healed    Assessment:        Problem List Items Addressed This Visit     Wound of right leg - Primary      1.  healed         Plan:     1. Recommend compression stockings 20-30 mm/hg  2. We will see on a as needed basis  New Prescriptions    No medications on file       Treatment Note please see attached Discharge Instructions    Written patient dismissal instructions given to patient and signed by patient or POA. Discharge Instructions       OK to cleanse wound with soap and water, pat dry. Keep band aid on during day, leave open to air at night. Continue to wear compression stockings at all times.  SIGNS OF INFECTION  - Redness, swelling, skin hot  - Wound bed turns black or stringy yellow  - Foul odor  - Increased drainage or pus  - Increased pain  - Fever greater than 100F    CALL YOUR DOCTOR OR SEEK MEDICAL ATTENTION IF SIGNS OF INFECTION.   DO NOT WAIT UNTIL YOUR NEXT APPOINTMENT    Call the Wound Care Nurse with any other questions or concerns- [phone]    Follow up as needed         Electronically signed by Larissa Sen MD on 12/29/2020 at 8:57 AM

## 2021-01-06 ENCOUNTER — TELEPHONE (OUTPATIENT)
Dept: PREADMISSION TESTING | Age: 66
End: 2021-01-06

## 2021-01-07 RX ORDER — LANOLIN ALCOHOL/MO/W.PET/CERES
325 CREAM (GRAM) TOPICAL 2 TIMES DAILY
Qty: 60 TABLET | Refills: 5 | Status: SHIPPED | OUTPATIENT
Start: 2021-01-07

## 2021-01-11 ENCOUNTER — VIRTUAL VISIT (OUTPATIENT)
Dept: FAMILY MEDICINE CLINIC | Age: 66
End: 2021-01-11
Payer: COMMERCIAL

## 2021-01-11 DIAGNOSIS — I10 ESSENTIAL HYPERTENSION: ICD-10-CM

## 2021-01-11 DIAGNOSIS — D50.9 IRON DEFICIENCY ANEMIA, UNSPECIFIED IRON DEFICIENCY ANEMIA TYPE: ICD-10-CM

## 2021-01-11 DIAGNOSIS — E78.5 HYPERLIPIDEMIA, UNSPECIFIED HYPERLIPIDEMIA TYPE: ICD-10-CM

## 2021-01-11 DIAGNOSIS — F41.9 ANXIETY: Primary | ICD-10-CM

## 2021-01-11 DIAGNOSIS — R73.9 ELEVATED BLOOD SUGAR: ICD-10-CM

## 2021-01-11 PROCEDURE — 99214 OFFICE O/P EST MOD 30 MIN: CPT | Performed by: NURSE PRACTITIONER

## 2021-01-11 RX ORDER — LORATADINE 10 MG/1
10 CAPSULE, LIQUID FILLED ORAL DAILY
Qty: 30 CAPSULE | Refills: 5 | Status: SHIPPED | OUTPATIENT
Start: 2021-01-11

## 2021-01-11 RX ORDER — ALPRAZOLAM 0.25 MG/1
0.25 TABLET ORAL NIGHTLY PRN
Qty: 30 TABLET | Refills: 0 | Status: SHIPPED | OUTPATIENT
Start: 2021-01-11 | End: 2021-02-10

## 2021-01-11 RX ORDER — AZELASTINE 1 MG/ML
1 SPRAY, METERED NASAL 2 TIMES DAILY
Qty: 1 BOTTLE | Refills: 5 | Status: SHIPPED | OUTPATIENT
Start: 2021-01-11

## 2021-01-11 ASSESSMENT — ENCOUNTER SYMPTOMS
BLURRED VISION: 0
SHORTNESS OF BREATH: 0
WHEEZING: 0

## 2021-01-11 ASSESSMENT — PATIENT HEALTH QUESTIONNAIRE - PHQ9
SUM OF ALL RESPONSES TO PHQ QUESTIONS 1-9: 2
1. LITTLE INTEREST OR PLEASURE IN DOING THINGS: 1
SUM OF ALL RESPONSES TO PHQ QUESTIONS 1-9: 2

## 2021-01-11 NOTE — PROGRESS NOTES
2021    TELEHEALTH EVALUATION -- Audio/Visual (During PHVOI-55 public health emergency)    HPI:    Ga Kaur (:  1955) has requested an audio/video evaluation for the following concern(s):    Pt presents to the clinic for a 6 month follow up of chronic medical conditions. She has a history of significant anxiety. She had COVID at the beginning of November and that significantly increased her anxiety level. She is feeling better overall. She still notes fatigue and brain fog. She is no longer short of breath or requiring oxygen to keep her levels above 92%. She has been taking a 1/2 to 1 tablet weekly of her xanax since her diagnosis of COVID. She has a history of elevated glucose. She is due for labs. She follows with hematology for her history of anemia. Hypertension  This is a chronic problem. The current episode started more than 1 year ago. The problem is unchanged. The problem is controlled. Associated symptoms include anxiety, headaches and malaise/fatigue. Pertinent negatives include no blurred vision, chest pain, palpitations, peripheral edema or shortness of breath. There are no associated agents to hypertension. Risk factors for coronary artery disease include dyslipidemia, obesity and stress. Past treatments include ACE inhibitors. The current treatment provides moderate improvement. Compliance problems include diet. Hyperlipidemia  This is a chronic problem. The current episode started more than 1 year ago. Exacerbating diseases include obesity. Factors aggravating her hyperlipidemia include fatty foods. Pertinent negatives include no chest pain, leg pain, myalgias or shortness of breath. Current antihyperlipidemic treatment includes diet change. Improvement on treatment: due for labs. Compliance problems include adherence to diet and adherence to exercise.   Risk factors for coronary artery disease include dyslipidemia, hypertension, obesity and post-menopausal. Past Medical History:   Diagnosis Date    Adjustment disorder with mixed emotional features 2020    Anemia 2020    Anxiety 2020    Asthma 2009    Chronic airway obstruction (HCC)     Chronic idiopathic constipation     Chronic pain     Chronic sinusitis     Colon cancer (HCC)     Degeneration of lumbar intervertebral disc     Diastolic heart failure (HCC)     GERD (gastroesophageal reflux disease) 2006    Herpes simplex     Hyperlipidemia 2006    Hypertension     Iron deficiency anemia 2020    LAYTON (obstructive sleep apnea) 2017    Osteoarthritis     Sciatica     Thrombosed hemorrhoids        No past surgical history on file.     Social History     Socioeconomic History    Marital status:      Spouse name: Not on file    Number of children: Not on file    Years of education: Not on file    Highest education level: Not on file   Occupational History    Not on file   Social Needs    Financial resource strain: Not on file    Food insecurity     Worry: Not on file     Inability: Not on file    Transportation needs     Medical: Not on file     Non-medical: Not on file   Tobacco Use    Smoking status: Former Smoker     Packs/day: 1.00     Types: Cigarettes     Start date: 7/10/1970     Quit date: 7/10/1984     Years since quittin.5    Smokeless tobacco: Never Used    Tobacco comment: quit cold turkey   Substance and Sexual Activity    Alcohol use: Yes     Frequency: Monthly or less     Drinks per session: 1 or 2     Binge frequency: Never    Drug use: Never    Sexual activity: Not on file   Lifestyle    Physical activity     Days per week: Not on file     Minutes per session: Not on file    Stress: Not on file   Relationships    Social connections     Talks on phone: Not on file     Gets together: Not on file     Attends Restorationist service: Not on file     Active member of club or organization: Not on file Attends meetings of clubs or organizations: Not on file     Relationship status: Not on file    Intimate partner violence     Fear of current or ex partner: Not on file     Emotionally abused: Not on file     Physically abused: Not on file     Forced sexual activity: Not on file   Other Topics Concern    Not on file   Social History Narrative    Not on file       Family History   Problem Relation Age of Onset    Heart Disease Mother     High Blood Pressure Mother     COPD Father     Heart Disease Father     High Blood Pressure Father     Cancer Father         lung       Allergies   Allergen Reactions    Amitriptyline     Amoxicillin-Pot Clavulanate     Aspirin     Capsaicin-Menthol-Methyl Sal     Eggs Or Egg-Derived Products     Morphine     Nitrofurantoin Monohyd Macro     Nsaids        Current Outpatient Medications   Medication Sig Dispense Refill    loratadine (CLARITIN) 10 MG capsule Take 1 capsule by mouth daily 30 capsule 5    azelastine (ASTELIN) 0.1 % nasal spray 1 spray by Nasal route 2 times daily Use in each nostril as directed 1 Bottle 5    ALPRAZolam (XANAX) 0.25 MG tablet Take 1 tablet by mouth nightly as needed for Anxiety for up to 30 days.  30 tablet 0    ferrous sulfate (FE TABS 325) 325 (65 Fe) MG EC tablet Take 1 tablet by mouth 2 times daily 60 tablet 5    ipratropium-albuterol (DUONEB) 0.5-2.5 (3) MG/3ML SOLN nebulizer solution INHALE CONTENTS OF 1 VIAL BY NEBULIZAITON EVERY 6 HOURS AS NEEDED FOR WHEEZING OR SHORTNESS OF BREATH      lansoprazole (PREVACID) 30 MG delayed release capsule TAKE ONE CAPSULE BY MOUTH ONCE DAILY      furosemide (LASIX) 40 MG tablet Take 40 mg by mouth daily      albuterol sulfate HFA (VENTOLIN HFA) 108 (90 Base) MCG/ACT inhaler Inhale 2 puffs into the lungs every 6 hours as needed for Wheezing       albuterol (PROVENTIL) (2.5 MG/3ML) 0.083% nebulizer solution Take 2.5 mg by nebulization 3 times daily furosemide (LASIX) 40 MG tablet Take 40 mg by mouth daily Yes Historical Provider, MD   albuterol sulfate HFA (VENTOLIN HFA) 108 (90 Base) MCG/ACT inhaler Inhale 2 puffs into the lungs every 6 hours as needed for Wheezing  Yes Historical Provider, MD   albuterol (PROVENTIL) (2.5 MG/3ML) 0.083% nebulizer solution Take 2.5 mg by nebulization 3 times daily Yes Historical Provider, MD   colestipol (COLESTID) 1 g tablet Take 1 g by mouth 2 times daily Take 2 tablets by mouth two times a day Yes Historical Provider, MD   potassium chloride (MICRO-K) 10 MEQ extended release capsule Take 10 mEq by mouth daily Yes Historical Provider, MD   fluticasone (FLONASE) 50 MCG/ACT nasal spray 2 sprays by Each Nostril route daily Yes Historical Provider, MD   lisinopril (PRINIVIL;ZESTRIL) 30 MG tablet TAKE ONE TABLET ONCE DAILY  Historical Provider, MD   fluticasone-salmeterol (ADVAIR DISKUS) 100-50 MCG/DOSE diskus inhaler Inhale 1 puff into the lungs every 12 hours  Historical Provider, MD       Social History     Tobacco Use    Smoking status: Former Smoker     Packs/day: 1.00     Types: Cigarettes     Start date: 7/10/1970     Quit date: 7/10/1984     Years since quittin.5    Smokeless tobacco: Never Used    Tobacco comment: quit cold turkey   Substance Use Topics    Alcohol use: Yes     Frequency: Monthly or less     Drinks per session: 1 or 2     Binge frequency: Never    Drug use: Never            PHYSICAL EXAMINATION:  [ INSTRUCTIONS:  \"[x]\" Indicates a positive item  \"[]\" Indicates a negative item  -- DELETE ALL ITEMS NOT EXAMINED]  Vital Signs: (As obtained by patient/caregiver or practitioner observation)    Blood pressure- 114/78 Heart rate- 85  Pulse oximetry- 97%    Constitutional: [x] Appears well-developed and well-nourished [x] No apparent distress      [] Abnormal-   Mental status  [x] Alert and awake  [x] Oriented to person/place/time [x]Able to follow commands      Eyes:  EOM    [x]  Normal  [] Abnormal- Sclera  [x]  Normal  [] Abnormal -         Discharge [x]  None visible  [] Abnormal -    HENT:   [x] Normocephalic, atraumatic. [] Abnormal   [x] Mouth/Throat: Mucous membranes are moist.     External Ears [x] Normal  [] Abnormal-     Neck: [x] No visualized mass     Pulmonary/Chest: [x] Respiratory effort normal.  [x] No visualized signs of difficulty breathing or respiratory distress        [] Abnormal-      Musculoskeletal:   [x] Normal gait with no signs of ataxia         [x] Normal range of motion of neck        [] Abnormal-       Neurological:        [x] No Facial Asymmetry (Cranial nerve 7 motor function) (limited exam to video visit)          [x] No gaze palsy        [] Abnormal-         Skin:        [x] No significant exanthematous lesions or discoloration noted on facial skin         [] Abnormal-            Psychiatric:       [x] Normal Affect [x] No Hallucinations        [x] Abnormal- anxious mood         ASSESSMENT/PLAN:  1. Anxiety  Stable continue current medication   - ALPRAZolam (XANAX) 0.25 MG tablet; Take 1 tablet by mouth nightly as needed for Anxiety for up to 30 days. Dispense: 30 tablet; Refill: 0    2. Essential hypertension  Stable continue current medication     3. Hyperlipidemia, unspecified hyperlipidemia type  Due for labs    4. Iron deficiency anemia, unspecified iron deficiency anemia type  Continue to follow with hematology     5. Elevated blood sugar  Due for labs    Discussed that the symptoms of COVID may continue for sometime. She is improving overall. She is to be tested for COVID for a pre op for back injections. We discussed that she may test positive on this test however she should not have to quarantine and this should not prevent her from getting the procedure done. Pt is to discuss with the health department as well.  Pt verbalizes understanding    Pt to return in 6 months sooner if needed    Controlled Substance Monitoring:    Acute and Chronic Pain Monitoring: No flowsheet data found. Return in about 6 months (around 7/11/2021), or if symptoms worsen or fail to improve. Alicia Trujillo is a 60 y.o. female being evaluated by a Virtual Visit (video visit) encounter to address concerns as mentioned above. A caregiver was present when appropriate. Due to this being a TeleHealth encounter (During CDSYG-11 public health emergency), evaluation of the following organ systems was limited: Vitals/Constitutional/EENT/Resp/CV/GI//MS/Neuro/Skin/Heme-Lymph-Imm. Pursuant to the emergency declaration under the [address] [place] [address], [address] [place] [address] authority and the [employer] Act, this Virtual Visit was conducted with patient's (and/or legal guardian's) consent, to reduce the patient's risk of exposure to COVID-19 and provide necessary medical care. The patient (and/or legal guardian) has also been advised to contact this office for worsening conditions or problems, and seek emergency medical treatment and/or call 911 if deemed necessary. Patient identification was verified at the start of the visit: Yes    Total time spent on this encounter: Not billed by time    Services were provided through a video synchronous discussion virtually to substitute for in-person clinic visit. Patient and provider were located at their individual homes. --DIAMOND Quan CNP on 1/11/2021 at 10:08 PM    An electronic signature was used to authenticate this note.

## 2021-01-13 ENCOUNTER — HOSPITAL ENCOUNTER (OUTPATIENT)
Dept: PREADMISSION TESTING | Age: 66
Setting detail: SPECIMEN
Discharge: HOME | End: 2021-01-17
Payer: COMMERCIAL

## 2021-01-13 DIAGNOSIS — Z11.59 ENCOUNTER FOR SCREENING FOR OTHER VIRAL DISEASES: Primary | ICD-10-CM

## 2021-01-13 PROCEDURE — U0003 INFECTIOUS AGENT DETECTION BY NUCLEIC ACID (DNA OR RNA); SEVERE ACUTE RESPIRATORY SYNDROME CORONAVIRUS 2 (SARS-COV-2) (CORONAVIRUS DISEASE [COVID-19]), AMPLIFIED PROBE TECHNIQUE, MAKING USE OF HIGH THROUGHPUT TECHNOLOGIES AS DESCRIBED BY CMS-2020-01-R: HCPCS

## 2021-01-14 LAB — SARS-COV-2, NAA: NOT DETECTED

## 2021-01-15 ENCOUNTER — OFFICE VISIT (OUTPATIENT)
Dept: ORTHOPEDIC SURGERY | Age: 66
End: 2021-01-15
Payer: COMMERCIAL

## 2021-01-15 VITALS
BODY MASS INDEX: 37.73 KG/M2 | DIASTOLIC BLOOD PRESSURE: 80 MMHG | SYSTOLIC BLOOD PRESSURE: 136 MMHG | WEIGHT: 221 LBS | HEART RATE: 77 BPM | HEIGHT: 64 IN

## 2021-01-15 DIAGNOSIS — M54.16 LUMBAR RADICULOPATHY: ICD-10-CM

## 2021-01-15 DIAGNOSIS — M70.62 TROCHANTERIC BURSITIS OF BOTH HIPS: Primary | ICD-10-CM

## 2021-01-15 DIAGNOSIS — M51.36 DDD (DEGENERATIVE DISC DISEASE), LUMBAR: ICD-10-CM

## 2021-01-15 DIAGNOSIS — M70.61 TROCHANTERIC BURSITIS OF BOTH HIPS: Primary | ICD-10-CM

## 2021-01-15 PROCEDURE — 99214 OFFICE O/P EST MOD 30 MIN: CPT | Performed by: PHYSICIAN ASSISTANT

## 2021-01-15 PROCEDURE — 20610 DRAIN/INJ JOINT/BURSA W/O US: CPT | Performed by: PHYSICIAN ASSISTANT

## 2021-01-15 RX ORDER — BUPIVACAINE HYDROCHLORIDE 5 MG/ML
2 INJECTION, SOLUTION PERINEURAL ONCE
Status: COMPLETED | OUTPATIENT
Start: 2021-01-15 | End: 2021-01-18

## 2021-01-15 RX ORDER — LIDOCAINE HYDROCHLORIDE 10 MG/ML
2 INJECTION, SOLUTION INFILTRATION; PERINEURAL ONCE
Status: COMPLETED | OUTPATIENT
Start: 2021-01-15 | End: 2021-01-18

## 2021-01-15 RX ORDER — METHYLPREDNISOLONE ACETATE 40 MG/ML
40 INJECTION, SUSPENSION INTRA-ARTICULAR; INTRALESIONAL; INTRAMUSCULAR; SOFT TISSUE ONCE
Status: COMPLETED | OUTPATIENT
Start: 2021-01-15 | End: 2021-01-18

## 2021-01-15 NOTE — PROGRESS NOTES
Orthopedic Office Note  [facility]e Greene 80  [address], [address] [place] [id number] [phone]      CHIEF COMPLAINT:    Chief Complaint   Patient presents with    Hip Pain     rech penny hips       HISTORY OF PRESENT ILLNESS:      The patient is a 60 y.o. female  who presents today for recheck of her hips and back. The patient states that she continues to have back pain that travels down the legs. She also reports bilateral lateral sided hip pain. The patient states that she has had a complicated past few months. She reports a history of a right lower leg wound that needed to be treated with the wound care center but has subsequently healed. In the middle of her wound healing she did contract COVID-19 but has recovered well with only a mild hazy-like feeling. The patient states that this did postpone her lumbar injections with pain management but she is set to receive these in the next week. The patient states that the pain through the hip region is sore and achy and worst with direct pressure and laying on the side. She states that she often wakes up at night when lying on 1 side for too long and feels like crying due to the pain. She denies redness, warmth, fevers or chills. The patient did slip out of the bathtub a few days ago and states that she landed on her left side with only soreness through the left shoulder region.     Past Medical History:    Past Medical History:   Diagnosis Date    Adjustment disorder with mixed emotional features 02/29/2020    Anemia 02/29/2020    Anxiety 02/29/2020    Asthma 03/03/2009    Chronic airway obstruction (HCC)     Chronic idiopathic constipation     Chronic pain     Chronic sinusitis     Colon cancer (HCC)     Degeneration of lumbar intervertebral disc     Diastolic heart failure (HCC)     GERD (gastroesophageal reflux disease) 11/03/2006    Herpes simplex  Hyperlipidemia 12/18/2006    Hypertension     Iron deficiency anemia 02/29/2020    LAYTON (obstructive sleep apnea) 12/06/2017    Osteoarthritis     Sciatica     Thrombosed hemorrhoids        Past Surgical History:    History reviewed. No pertinent surgical history. Medications Prior to Admission:   Current Outpatient Medications   Medication Sig Dispense Refill    loratadine (CLARITIN) 10 MG capsule Take 1 capsule by mouth daily 30 capsule 5    azelastine (ASTELIN) 0.1 % nasal spray 1 spray by Nasal route 2 times daily Use in each nostril as directed 1 Bottle 5    ALPRAZolam (XANAX) 0.25 MG tablet Take 1 tablet by mouth nightly as needed for Anxiety for up to 30 days.  30 tablet 0    ferrous sulfate (FE TABS 325) 325 (65 Fe) MG EC tablet Take 1 tablet by mouth 2 times daily 60 tablet 5    ipratropium-albuterol (DUONEB) 0.5-2.5 (3) MG/3ML SOLN nebulizer solution INHALE CONTENTS OF 1 VIAL BY NEBULIZAITON EVERY 6 HOURS AS NEEDED FOR WHEEZING OR SHORTNESS OF BREATH      lisinopril (PRINIVIL;ZESTRIL) 30 MG tablet TAKE ONE TABLET ONCE DAILY      lansoprazole (PREVACID) 30 MG delayed release capsule TAKE ONE CAPSULE BY MOUTH ONCE DAILY      furosemide (LASIX) 40 MG tablet Take 40 mg by mouth daily      albuterol sulfate HFA (VENTOLIN HFA) 108 (90 Base) MCG/ACT inhaler Inhale 2 puffs into the lungs every 6 hours as needed for Wheezing       albuterol (PROVENTIL) (2.5 MG/3ML) 0.083% nebulizer solution Take 2.5 mg by nebulization 3 times daily      colestipol (COLESTID) 1 g tablet Take 1 g by mouth 2 times daily Take 2 tablets by mouth two times a day      fluticasone-salmeterol (ADVAIR DISKUS) 100-50 MCG/DOSE diskus inhaler Inhale 1 puff into the lungs every 12 hours      potassium chloride (MICRO-K) 10 MEQ extended release capsule Take 10 mEq by mouth daily      fluticasone (FLONASE) 50 MCG/ACT nasal spray 2 sprays by Each Nostril route daily No current facility-administered medications for this visit. Allergies:  Amitriptyline, Amoxicillin-pot clavulanate, Aspirin, Capsaicin-menthol-methyl sal, Eggs or egg-derived products, Morphine, Nitrofurantoin monohyd macro, and Nsaids    Social History:   Social History     Tobacco Use   Smoking Status Former Smoker    Packs/day: 1.00    Types: Cigarettes    Start date: 7/10/1970   Prerna Rader Quit date: 7/10/1984    Years since quittin.5   Smokeless Tobacco Never Used   Tobacco Comment    quit cold turkey     Social History     Substance and Sexual Activity   Alcohol Use Yes    Frequency: Monthly or less    Drinks per session: 1 or 2    Binge frequency: Never     Social History     Substance and Sexual Activity   Drug Use Never       Family History:  Family History   Problem Relation Age of Onset    Heart Disease Mother     High Blood Pressure Mother     COPD Father     Heart Disease Father     High Blood Pressure Father     Cancer Father         lung         REVIEW OF SYSTEMS:  Please see the ROS form attached to today's encounter. I have reviewed it with the patient during the visit. All other systems were reviewed and are negative. PHYSICAL EXAM:  Patient is a age-appropriate 71-year-old female, alert and oriented ×3 and in no acute distress. Well-dressed and well-groomed and stands with normal body position. In a calm mood. Patient is normocephalic and exhibits nonlabored respirations. Clear conjunctiva and pupils that are reactive. No head trauma. Normal muscle tone and bulk. No lymphadenopathy. No open wounds, masses, or skin lesions. Deep tendon reflexes are intact and symmetric. Skin is intact. Palpable pulses distally. Brisk capillary refill. The patient has discomfort through the lumbar spine with lumbar range of motion as well as with palpation along the lumbar spine and paraspinal muscles. Straight leg raise is mildly positive bilaterally. She has good strength in the lower extremities. She has good hip range of motion. No instability. She is tender to palpation over the trochanteric bursa bilaterally. Logroll test is negative. She has good knee range of motion with no abnormality or instability. She ambulates with a nonantalgic gait. With respect of the shoulder she has full shoulder range of motion and no weakness with resisted rotator cuff testing. No instability. Radiology:  No new radiographs today. Previous radiographs were reviewed. ASSESSMENT/PLAN:  1. Trochanteric bursitis of both hips    2. Lumbar radiculopathy    3. DDD (degenerative disc disease), lumbar        We have discussed continued treatment with the patient at length. I have recommended she continue treatment for her lumbar spine with pain management and consider epidural injections. She discussed treatment of her hip with pain management and both pain management and I have recommended a repeat trochanteric bursa corticosteroid injection. The patient would like to repeat bilateral trochanteric bursa injections. Risks and benefits were discussed with the patient and they have elected to proceed with the injections. Injection was performed into both trochanteric bursa using a combination of 1cc of Depo-Medrol and local anesthetic. The patient tolerated the injections well. We will follow up after her epidural injections have been completed for discussion of results and further treatment options. No orders of the defined types were placed in this encounter. John Height, PA-C

## 2021-01-18 ENCOUNTER — ANESTHESIA (OUTPATIENT)
Dept: OPERATING ROOM | Age: 66
End: 2021-01-18
Payer: COMMERCIAL

## 2021-01-18 ENCOUNTER — HOSPITAL ENCOUNTER (OUTPATIENT)
Age: 66
Setting detail: OUTPATIENT SURGERY
Discharge: HOME | End: 2021-01-18
Attending: PHYSICAL MEDICINE & REHABILITATION | Admitting: PHYSICAL MEDICINE & REHABILITATION
Payer: COMMERCIAL

## 2021-01-18 ENCOUNTER — APPOINTMENT (OUTPATIENT)
Dept: GENERAL RADIOLOGY | Age: 66
End: 2021-01-18
Attending: PHYSICAL MEDICINE & REHABILITATION
Payer: COMMERCIAL

## 2021-01-18 ENCOUNTER — ANESTHESIA EVENT (OUTPATIENT)
Dept: OPERATING ROOM | Age: 66
End: 2021-01-18
Payer: COMMERCIAL

## 2021-01-18 VITALS
BODY MASS INDEX: 37.73 KG/M2 | DIASTOLIC BLOOD PRESSURE: 65 MMHG | HEIGHT: 64 IN | WEIGHT: 221 LBS | SYSTOLIC BLOOD PRESSURE: 135 MMHG | HEART RATE: 74 BPM | OXYGEN SATURATION: 95 % | TEMPERATURE: 97 F | RESPIRATION RATE: 18 BRPM

## 2021-01-18 VITALS — OXYGEN SATURATION: 96 % | SYSTOLIC BLOOD PRESSURE: 127 MMHG | DIASTOLIC BLOOD PRESSURE: 59 MMHG

## 2021-01-18 PROCEDURE — 6360000002 HC RX W HCPCS: Performed by: PHYSICAL MEDICINE & REHABILITATION

## 2021-01-18 PROCEDURE — 3700000001 HC ADD 15 MINUTES (ANESTHESIA): Performed by: PHYSICAL MEDICINE & REHABILITATION

## 2021-01-18 PROCEDURE — 7100000011 HC PHASE II RECOVERY - ADDTL 15 MIN: Performed by: PHYSICAL MEDICINE & REHABILITATION

## 2021-01-18 PROCEDURE — 6360000004 HC RX CONTRAST MEDICATION: Performed by: PHYSICAL MEDICINE & REHABILITATION

## 2021-01-18 PROCEDURE — 3600000056 HC PAIN LEVEL 4 BASE: Performed by: PHYSICAL MEDICINE & REHABILITATION

## 2021-01-18 PROCEDURE — 3209999900 FLUORO FOR SURGICAL PROCEDURES

## 2021-01-18 PROCEDURE — 3600000057 HC PAIN LEVEL 4 ADDL 15 MIN: Performed by: PHYSICAL MEDICINE & REHABILITATION

## 2021-01-18 PROCEDURE — 64493 INJ PARAVERT F JNT L/S 1 LEV: CPT | Performed by: PHYSICAL MEDICINE & REHABILITATION

## 2021-01-18 PROCEDURE — 64494 INJ PARAVERT F JNT L/S 2 LEV: CPT | Performed by: PHYSICAL MEDICINE & REHABILITATION

## 2021-01-18 PROCEDURE — 2500000003 HC RX 250 WO HCPCS: Performed by: PHYSICAL MEDICINE & REHABILITATION

## 2021-01-18 PROCEDURE — 2580000003 HC RX 258: Performed by: PHYSICAL MEDICINE & REHABILITATION

## 2021-01-18 PROCEDURE — 2709999900 HC NON-CHARGEABLE SUPPLY: Performed by: PHYSICAL MEDICINE & REHABILITATION

## 2021-01-18 PROCEDURE — 7100000010 HC PHASE II RECOVERY - FIRST 15 MIN: Performed by: PHYSICAL MEDICINE & REHABILITATION

## 2021-01-18 PROCEDURE — 2500000003 HC RX 250 WO HCPCS: Performed by: NURSE ANESTHETIST, CERTIFIED REGISTERED

## 2021-01-18 PROCEDURE — 3700000000 HC ANESTHESIA ATTENDED CARE: Performed by: PHYSICAL MEDICINE & REHABILITATION

## 2021-01-18 PROCEDURE — 6360000002 HC RX W HCPCS: Performed by: NURSE ANESTHETIST, CERTIFIED REGISTERED

## 2021-01-18 RX ORDER — LIDOCAINE HYDROCHLORIDE 20 MG/ML
INJECTION, SOLUTION EPIDURAL; INFILTRATION; INTRACAUDAL; PERINEURAL PRN
Status: DISCONTINUED | OUTPATIENT
Start: 2021-01-18 | End: 2021-01-18 | Stop reason: ALTCHOICE

## 2021-01-18 RX ORDER — SODIUM CHLORIDE 0.9 % (FLUSH) 0.9 %
10 SYRINGE (ML) INJECTION PRN
Status: DISCONTINUED | OUTPATIENT
Start: 2021-01-18 | End: 2021-01-18 | Stop reason: HOSPADM

## 2021-01-18 RX ORDER — DEXAMETHASONE SODIUM PHOSPHATE 10 MG/ML
INJECTION INTRAMUSCULAR; INTRAVENOUS PRN
Status: DISCONTINUED | OUTPATIENT
Start: 2021-01-18 | End: 2021-01-18 | Stop reason: ALTCHOICE

## 2021-01-18 RX ORDER — PROPOFOL 10 MG/ML
INJECTION, EMULSION INTRAVENOUS PRN
Status: DISCONTINUED | OUTPATIENT
Start: 2021-01-18 | End: 2021-01-18 | Stop reason: SDUPTHER

## 2021-01-18 RX ORDER — SODIUM CHLORIDE, SODIUM LACTATE, POTASSIUM CHLORIDE, CALCIUM CHLORIDE 600; 310; 30; 20 MG/100ML; MG/100ML; MG/100ML; MG/100ML
INJECTION, SOLUTION INTRAVENOUS CONTINUOUS
Status: DISCONTINUED | OUTPATIENT
Start: 2021-01-18 | End: 2021-01-18 | Stop reason: SDUPTHER

## 2021-01-18 RX ORDER — SODIUM CHLORIDE, SODIUM LACTATE, POTASSIUM CHLORIDE, CALCIUM CHLORIDE 600; 310; 30; 20 MG/100ML; MG/100ML; MG/100ML; MG/100ML
INJECTION, SOLUTION INTRAVENOUS CONTINUOUS
Status: DISCONTINUED | OUTPATIENT
Start: 2021-01-18 | End: 2021-01-18 | Stop reason: HOSPADM

## 2021-01-18 RX ORDER — LIDOCAINE HYDROCHLORIDE 20 MG/ML
INJECTION, SOLUTION EPIDURAL; INFILTRATION; INTRACAUDAL; PERINEURAL PRN
Status: DISCONTINUED | OUTPATIENT
Start: 2021-01-18 | End: 2021-01-18 | Stop reason: SDUPTHER

## 2021-01-18 RX ORDER — SODIUM CHLORIDE 0.9 % (FLUSH) 0.9 %
10 SYRINGE (ML) INJECTION EVERY 12 HOURS SCHEDULED
Status: DISCONTINUED | OUTPATIENT
Start: 2021-01-18 | End: 2021-01-18 | Stop reason: HOSPADM

## 2021-01-18 RX ADMIN — METHYLPREDNISOLONE ACETATE 40 MG: 40 INJECTION, SUSPENSION INTRA-ARTICULAR; INTRALESIONAL; INTRAMUSCULAR; SOFT TISSUE at 07:14

## 2021-01-18 RX ADMIN — LIDOCAINE HYDROCHLORIDE 100 MG: 20 INJECTION, SOLUTION EPIDURAL; INFILTRATION; INTRACAUDAL; PERINEURAL at 08:25

## 2021-01-18 RX ADMIN — SODIUM CHLORIDE, POTASSIUM CHLORIDE, SODIUM LACTATE AND CALCIUM CHLORIDE: 600; 310; 30; 20 INJECTION, SOLUTION INTRAVENOUS at 07:36

## 2021-01-18 RX ADMIN — PROPOFOL 300 MG: 10 INJECTION, EMULSION INTRAVENOUS at 08:25

## 2021-01-18 RX ADMIN — LIDOCAINE HYDROCHLORIDE 2 ML: 10 INJECTION, SOLUTION INFILTRATION; PERINEURAL at 07:15

## 2021-01-18 RX ADMIN — BUPIVACAINE HYDROCHLORIDE 10 MG: 5 INJECTION, SOLUTION PERINEURAL at 07:17

## 2021-01-18 RX ADMIN — BUPIVACAINE HYDROCHLORIDE 10 MG: 5 INJECTION, SOLUTION PERINEURAL at 07:16

## 2021-01-18 ASSESSMENT — ENCOUNTER SYMPTOMS
RESPIRATORY NEGATIVE: 1
ALLERGIC/IMMUNOLOGIC NEGATIVE: 1
BACK PAIN: 1
EYES NEGATIVE: 1
DIARRHEA: 0
CONSTIPATION: 0

## 2021-01-18 ASSESSMENT — PULMONARY FUNCTION TESTS
PIF_VALUE: 0

## 2021-01-18 ASSESSMENT — COPD QUESTIONNAIRES: CAT_SEVERITY: MODERATE

## 2021-01-18 ASSESSMENT — PAIN SCALES - GENERAL
PAINLEVEL_OUTOF10: 0
PAINLEVEL_OUTOF10: 0

## 2021-01-18 NOTE — H&P
Subjective:       Patient is here today for a diagnostic/therapeutic injection. 1/18/21    Active Hospital Problems    Diagnosis Date Noted    Lumbar facet joint syndrome [M47.816] 11/03/2020    Panniculitis involving lumbar region [M54.06] 11/03/2020       HPI: Patient is here today for adiagnostic/therapeutic injection. The most recent [facility] office visit notes have been reviewed and are unchanged. Review of Systems   Constitutional: Positive for fatigue. HENT: Negative. Eyes: Negative. Respiratory: Negative. Cardiovascular: Negative. Gastrointestinal: Negative for constipation and diarrhea. Endocrine: Negative. Genitourinary: Negative for difficulty urinating and flank pain. Musculoskeletal: Positive for back pain and myalgias. Skin: Negative. Allergic/Immunologic: Negative. Neurological: Positive for weakness and numbness. Hematological: Negative. Psychiatric/Behavioral: Positive for sleep disturbance. Allergies   Allergen Reactions    Morphine Hives    Amitriptyline Nausea Only    Amoxicillin-Pot Clavulanate Nausea Only    Aspirin Other (See Comments)     Sensitive \"gets sick\"    Capsaicin-Menthol-Methyl Sal Dermatitis    Eggs Or Egg-Derived Products Other (See Comments)    Nitrofurantoin Monohyd Macro Nausea Only    Nsaids Nausea Only          Prior to Admission medications    Medication Sig Start Date End Date Taking? Authorizing Provider   loratadine (CLARITIN) 10 MG capsule Take 1 capsule by mouth daily 1/11/21  Yes DIAMOND Shepherd CNP   azelastine (ASTELIN) 0.1 % nasal spray 1 spray by Nasal route 2 times daily Use in each nostril as directed 1/11/21  Yes DIAMOND Shepherd CNP   ALPRAZolam (XANAX) 0.25 MG tablet Take 1 tablet by mouth nightly as needed for Anxiety for up to 30 days.  1/11/21 2/10/21 Yes DIAMOND Conde CNP   ferrous sulfate (FE TABS 325) 325 (65 Fe) MG EC tablet Take 1 tablet by mouth 2 times daily 1/7/21  Yes Óscar Lobato MD   ipratropium-albuterol (DUONEB) 0.5-2.5 (3) MG/3ML SOLN nebulizer solution INHALE CONTENTS OF 1 VIAL BY NEBULIZAITON EVERY 6 HOURS AS NEEDED FOR WHEEZING OR SHORTNESS OF BREATH 11/18/20  Yes Historical Provider, MD   lisinopril (PRINIVIL;ZESTRIL) 30 MG tablet TAKE ONE TABLET ONCE DAILY 11/17/20  Yes Historical Provider, MD   lansoprazole (PREVACID) 30 MG delayed release capsule TAKE ONE CAPSULE BY MOUTH ONCE DAILY 10/13/20  Yes Historical Provider, MD   furosemide (LASIX) 40 MG tablet Take 40 mg by mouth daily   Yes Historical Provider, MD   albuterol sulfate HFA (VENTOLIN HFA) 108 (90 Base) MCG/ACT inhaler Inhale 2 puffs into the lungs every 6 hours as needed for Wheezing    Yes Historical Provider, MD   albuterol (PROVENTIL) (2.5 MG/3ML) 0.083% nebulizer solution Take 2.5 mg by nebulization 3 times daily   Yes Historical Provider, MD   colestipol (COLESTID) 1 g tablet Take 1 g by mouth 2 times daily Take 2 tablets by mouth two times a day   Yes Historical Provider, MD   fluticasone-salmeterol (ADVAIR DISKUS) 100-50 MCG/DOSE diskus inhaler Inhale 1 puff into the lungs every 12 hours   Yes Historical Provider, MD   potassium chloride (MICRO-K) 10 MEQ extended release capsule Take 10 mEq by mouth daily   Yes Historical Provider, MD   fluticasone (FLONASE) 50 MCG/ACT nasal spray 2 sprays by Each Nostril route daily   Yes Historical Provider, MD       Past Medical History:   Diagnosis Date    Adjustment disorder with mixed emotional features 02/29/2020    Anemia 02/29/2020    Anxiety 02/29/2020    Asthma 03/03/2009    Chronic airway obstruction (HCC)     Chronic idiopathic constipation     Chronic pain     Chronic sinusitis     Colon cancer ([facility] [address]     Degeneration of lumbar intervertebral disc     Diastolic heart failure (HCC)     GERD (gastroesophageal reflux disease) 11/03/2006    Herpes simplex     Hyperlipidemia 2006    Hypertension     Iron deficiency anemia 2020    LAYTON (obstructive sleep apnea) 2017    Osteoarthritis     Sciatica     Thrombosed hemorrhoids        History reviewed. No pertinent surgical history. Family andSocial History reviewed in the electronic medical record. Imaging: Reviewed available imaging in oursystem with the patient. No results found. Objective:     Vitals:    21 0714   BP: (!) 158/78   Pulse: 78   Resp: 18   Temp: 97 °F (36.1 °C)   SpO2: 95%          Physical Exam  Constitutional:       General: She is not in acute distress. Appearance: Normal appearance. She is well-developed. She is not diaphoretic. HENT:      Head: Normocephalic and atraumatic. Right Ear: External ear normal. No decreased hearing noted. Left Ear: External ear normal. No decreased hearing noted. Nose: Nose normal.   Eyes:      General: Lids are normal. No scleral icterus. Conjunctiva/sclera: Conjunctivae normal.   Neck:      Trachea: Phonation normal.   Cardiovascular:      Comments: No BLE edema present  Pulmonary:      Effort: Pulmonary effort is normal. No accessory muscle usage or respiratory distress. Abdominal:      General: Abdomen is flat. Palpations: Abdomen is soft. Genitourinary:     Comments: deferred  Skin:     General: Skin is warm and dry. Coloration: Skin is not pale. Findings: No erythema or rash. Neurological:      General: No focal deficit present. Mental Status: She is alert and oriented to person, place, and time. Psychiatric:         Mood and Affect: Mood normal.         Speech: Speech normal.         Behavior: Behavior normal.       Neurologic Exam     Mental Status   Oriented to person, place, and time.    Speech: speech is normal     Motor Exam     Strength   Right quadriceps: 5/5  Left quadriceps: 5/5  Right hamstrin/5  Left hamstrin/5    Back Exam     Tenderness   The patient is experiencing

## 2021-01-18 NOTE — OP NOTE
ZYGAPOPHYSEAL JOINT INJECTION    1/18/21  The patient was counseled at length about the risks of nathan Covid-19 during their perioperative period and any recovery window from their procedure. The patient was made aware that nathan Covid-19  may worsen their prognosis for recovering from their procedure  and lend to a higher morbidity and/or mortality risk. All material risks, benefits, and reasonable alternatives including postponing the procedure were discussed. The patient does wish to proceed with the procedure at this time.       Surgeon: Jorge Jennings MD    Pre-operative Diagnosis:   Patient Active Problem List   Diagnosis Code    Iron malabsorption K90.9    Iron deficiency anemia D50.9    Adjustment disorder with mixed emotional features F43.29    Anemia D64.9    Anxiety F41.9    Asthma J45.909    Benign neoplasm of colon D12.6    Benign neoplasm of stomach D13.1    Chronic idiopathic constipation K59.04    Chronic pain G89.29    Chronic airway obstruction (HCC) J44.9    Decreased range of motion of shoulder M25.619    Degeneration of lumbar intervertebral disc M51.36    Depression K97.3    Diastolic heart failure (HCC) I50.30    Disorder of soft tissue M79.9    Edema of lower extremity R60.0    Facial pressure R44.8    Gastroesophageal reflux disease K21.9    Headache R51.9    Herpes simplex B00.9    History of multiple allergies Z91.89    Hyperlipidemia E78.5    Essential hypertension I10    Obstructive sleep apnea syndrome G47.33    Osteoarthrosis M19.90    Panic disorder with agoraphobia F40.01    PND (post-nasal drip) R09.82    Primary localized osteoarthritis of pelvic region and thigh M16.10    Right hip pain M25.551    Sciatica M54.30    Shoulder pain M25.519    Somnolence R40.0    Strain of tendon of left rotator cuff S46.012A    Stress F43.9    Throat clearing R68.89    Thrombosed hemorrhoids K64.5    Trochanteric bursitis of left hip M70.62    Wound of right leg S81.801A    Lumbar facet joint syndrome M47.816    Panniculitis involving lumbar region M54.06    Lumbar radiculopathy M54.16       Post-operative Diagnosis: Same    INDICATION:  Previous treatment and examination findings are noted in the H&P and previous clinic notes.  Bilateral L4-5 5-S1 facet joint injections have been requested for diagnostic and therapeutic reasons. Conservative treatment was ineffective i.e.: ice, NSAIDS, rest, narcotic medication, chiropractic care, physical therapy and message therapy. Patient is unable to perform the following ADL's: ambulating, grooming, sitting and standing    Pain Assessment: 0-10  Pain Level: 6     Pain Orientation: Lower  Pain Location: Back  Pain Descriptors: Constant    Last Plain films: 2020      EXAMINATION:   B L4-5 5-S1 facet joint injections with arthrography. CONSENT:  Written consent was obtained from the patient on preprinted consent form after explaining the procedure, indications, potential complications and outcomes.  Alternative treatments were also discussed. DISCUSSION:  The patient was sterilely prepped and draped in the usual fashion in the prone position.  Time out\" was verified for correct patient, side, level and procedure. SEDATION:  No conscious sedation was performed during the procedure.  The patient remained awake and conversed throughout the procedure.  The patient underwent pulse oximetry and blood pressure monitoring independently by a trained observer, as well as by a physician. PROCEDURE[de-identified]  Under image-intensifier control, a standard technique was employed, a 22 gauge needle x 5 inch spinal needle was guided successfully to cannulate the B L4-5 5-S1 zygapophyseal joint via a posterior lateral approach. Instillation of .5 mL of Omnipaque 240 contrast medium demonstrated contiguous flow into the joint and the joint capsule. No vascular spread was noted.  Digital subtraction was not employed to evaluate for vascular spread.] The patient was monitored for any untoward reaction to contrast medium before proceeding with procedure #2. Dexamethasone (Decadron 10 mg/mL) was injected into each joint. A total of 4 joints were injected. PROVOCATION: The patient did not  report pain reproduction in a concordant distribution upon capsular distention of the lumbar facet joints from the contrast injection. ARTHROGRAPHY: The lumbar facet arthrogram revealed contrast in the joint space in the superior and inferior recesses; no contrast extravasation. The patient tolerated the procedure well and without complications and was noted to be in stable condition prior to discharge from the procedure center with discharge instructions. IMPRESSIONS:  1. lumbar facet arthrogram is abnormal: . .  2. lumbar facet joint injection negative for provocation of the patient's pain symptoms. EBL: no blood loss    SPECIMEN: none    RECOMMENDATIONS:  1. Complete and return Post-Procedure Pain and Activity Diary.   2. Contact the [address] for symptom exacerbation, fever or unusual symptoms. 3. Post-procedure care according to verbal and written discharge instructions  4. Continue with PT as per MD directions. 5. Consider diagnositc MBB if there is > 80% pain relief and improved activity scores. SPINE FLUOROSCOPIC IMAGE INTERPRETATION    EXAMINATION:  AP, Lateral, and Oblique views. FLUORO TIME: 23 seconds    DISCUSSION:  Spot views of the spine reveal normal alignment and segmentation. Spinal needles are positioned in the lumbar facet joint. Contrast outlines the joint space and reveals . Tanvi [place] Visualized spine reveals disc space . Tanvi [place] Soft tissues reveal no abnormalities.     IMPRESSION: lumbar facet arthrogram shows satisfactory needle placement and contrast dispersal.    Electronically signed by Kenneth Lieberman MD on 1/18/2021 at 8:18 AM.

## 2021-01-18 NOTE — ANESTHESIA PRE PROCEDURE
Department of Anesthesiology  Preprocedure Note       Name:  Renae Buckley   Age:  60 y.o.  :  1955                                          MRN:  [id number]         Date:  2021      Surgeon: Mira Gordonr):  Oswaldo Scott MD    Procedure: Procedure(s):  Bilateral L4-L5 L5-S1 FACET    Medications prior to admission:   Prior to Admission medications    Medication Sig Start Date End Date Taking? Authorizing Provider   loratadine (CLARITIN) 10 MG capsule Take 1 capsule by mouth daily 21  Yes DIAMOND Brown CNP   azelastine (ASTELIN) 0.1 % nasal spray 1 spray by Nasal route 2 times daily Use in each nostril as directed 21  Yes IDAMOND Brown CNP   ALPRAZolam (XANAX) 0.25 MG tablet Take 1 tablet by mouth nightly as needed for Anxiety for up to 30 days.  1/11/21 2/10/21 Yes DIAMOND Conde CNP   ferrous sulfate (FE TABS 325) 325 (65 Fe) MG EC tablet Take 1 tablet by mouth 2 times daily 21  Yes Brenton Agustin MD   ipratropium-albuterol (DUONEB) 0.5-2.5 (3) MG/3ML SOLN nebulizer solution INHALE CONTENTS OF 1 VIAL BY NEBULIZAITON EVERY 6 HOURS AS NEEDED FOR WHEEZING OR SHORTNESS OF BREATH 20  Yes Historical Provider, MD   lisinopril (PRINIVIL;ZESTRIL) 30 MG tablet TAKE ONE TABLET ONCE DAILY 20  Yes Historical Provider, MD   lansoprazole (PREVACID) 30 MG delayed release capsule TAKE ONE CAPSULE BY MOUTH ONCE DAILY 10/13/20  Yes Historical Provider, MD   furosemide (LASIX) 40 MG tablet Take 40 mg by mouth daily   Yes Historical Provider, MD   albuterol sulfate HFA (VENTOLIN HFA) 108 (90 Base) MCG/ACT inhaler Inhale 2 puffs into the lungs every 6 hours as needed for Wheezing    Yes Historical Provider, MD   albuterol (PROVENTIL) (2.5 MG/3ML) 0.083% nebulizer solution Take 2.5 mg by nebulization 3 times daily   Yes Historical Provider, MD colestipol (COLESTID) 1 g tablet Take 1 g by mouth 2 times daily Take 2 tablets by mouth two times a day   Yes Historical Provider, MD   fluticasone-salmeterol (ADVAIR DISKUS) 100-50 MCG/DOSE diskus inhaler Inhale 1 puff into the lungs every 12 hours   Yes Historical Provider, MD   potassium chloride (MICRO-K) 10 MEQ extended release capsule Take 10 mEq by mouth daily   Yes Historical Provider, MD   fluticasone (FLONASE) 50 MCG/ACT nasal spray 2 sprays by Each Nostril route daily   Yes Historical Provider, MD       Current medications:    Current Facility-Administered Medications   Medication Dose Route Frequency Provider Last Rate Last Admin    lactated ringers infusion   Intravenous Continuous Mary Ellen Castillo MD           Allergies:     Allergies   Allergen Reactions    Amitriptyline     Amoxicillin-Pot Clavulanate     Aspirin     Capsaicin-Menthol-Methyl Sal     Eggs Or Egg-Derived Products     Morphine     Nitrofurantoin Monohyd Macro     Nsaids        Problem List:    Patient Active Problem List   Diagnosis Code    Iron malabsorption K90.9    Iron deficiency anemia D50.9    Adjustment disorder with mixed emotional features F43.29    Anemia D64.9    Anxiety F41.9    Asthma J45.909    Benign neoplasm of colon D12.6    Benign neoplasm of stomach D13.1    Chronic idiopathic constipation K59.04    Chronic pain G89.29    Chronic airway obstruction (HCC) J44.9    Decreased range of motion of shoulder M25.619    Degeneration of lumbar intervertebral disc M51.36    Depression G37.3    Diastolic heart failure (HCC) I50.30    Disorder of soft tissue M79.9    Edema of lower extremity R60.0    Facial pressure R44.8    Gastroesophageal reflux disease K21.9    Headache R51.9    Herpes simplex B00.9    History of multiple allergies Z91.89    Hyperlipidemia E78.5    Essential hypertension I10    Obstructive sleep apnea syndrome G47.33    Osteoarthrosis M19.90  Panic disorder with agoraphobia F40.01    PND (post-nasal drip) R09.82    Primary localized osteoarthritis of pelvic region and thigh M16.10    Right hip pain M25.551    Sciatica M54.30    Shoulder pain M25.519    Somnolence R40.0    Strain of tendon of left rotator cuff S46.012A    Stress F43.9    Throat clearing R68.89    Thrombosed hemorrhoids K64.5    Trochanteric bursitis of left hip M70.62    Wound of right leg S81.801A    Lumbar facet joint syndrome M47.816    Panniculitis involving lumbar region M54.06    Lumbar radiculopathy M54.16       Past Medical History:        Diagnosis Date    Adjustment disorder with mixed emotional features 2020    Anemia 2020    Anxiety 2020    Asthma 2009    Chronic airway obstruction (HCC)     Chronic idiopathic constipation     Chronic pain     Chronic sinusitis     Colon cancer (HCC)     Degeneration of lumbar intervertebral disc     Diastolic heart failure (HCC)     GERD (gastroesophageal reflux disease) 2006    Herpes simplex     Hyperlipidemia 2006    Hypertension     Iron deficiency anemia 2020    LAYTON (obstructive sleep apnea) 2017    Osteoarthritis     Sciatica     Thrombosed hemorrhoids        Past Surgical History:  History reviewed. No pertinent surgical history.     Social History:    Social History     Tobacco Use    Smoking status: Former Smoker     Packs/day: 1.00     Types: Cigarettes     Start date: 7/10/1970     Quit date: 7/10/1984     Years since quittin.5    Smokeless tobacco: Never Used    Tobacco comment: quit cold turkey   Substance Use Topics    Alcohol use: Yes     Frequency: Monthly or less     Drinks per session: 1 or 2     Binge frequency: Never                                Counseling given: Not Answered  Comment: quit cold turkey      Vital Signs (Current):   Vitals:    21 0714   BP: (!) 158/78   Pulse: 78   Resp: 18   Temp: 36.1 °C (97 °F) TM distance: >3 FB   Neck ROM: full  Mouth opening: > = 3 FB Dental: normal exam         Pulmonary:normal exam    (+) COPD: moderate and no interval change,  sleep apnea: on noncompliant,  asthma:                            Cardiovascular:    (+) hypertension:,                   Neuro/Psych:   (+) neuromuscular disease:, headaches:, psychiatric history:            GI/Hepatic/Renal:   (+) GERD:, morbid obesity          Endo/Other:                     Abdominal:           Vascular:                                    Anesthesia Plan      general and TIVA     ASA 3       Induction: intravenous. Anesthetic plan and risks discussed with patient.                     Jerone Alpers, DIAMOND - CRNA   1/18/2021

## 2021-01-18 NOTE — INTERVAL H&P NOTE
I have interviewed and examined the patient and reviewed the recent History and Physical.  There have been no changes to the recent H&P documentation. The surgical consent form has been signed. Last anticoagulant medication use was:na    Premedication taken for contrast allergy? No    Valium taken for oral sedation? No    Outpatient Medications Marked as Taking for the 1/18/21 encounter [facility] Encounter)   Medication Sig Dispense Refill    loratadine (CLARITIN) 10 MG capsule Take 1 capsule by mouth daily 30 capsule 5    azelastine (ASTELIN) 0.1 % nasal spray 1 spray by Nasal route 2 times daily Use in each nostril as directed 1 Bottle 5    ALPRAZolam (XANAX) 0.25 MG tablet Take 1 tablet by mouth nightly as needed for Anxiety for up to 30 days.  30 tablet 0    ferrous sulfate (FE TABS 325) 325 (65 Fe) MG EC tablet Take 1 tablet by mouth 2 times daily 60 tablet 5    ipratropium-albuterol (DUONEB) 0.5-2.5 (3) MG/3ML SOLN nebulizer solution INHALE CONTENTS OF 1 VIAL BY NEBULIZAITON EVERY 6 HOURS AS NEEDED FOR WHEEZING OR SHORTNESS OF BREATH      lisinopril (PRINIVIL;ZESTRIL) 30 MG tablet TAKE ONE TABLET ONCE DAILY      lansoprazole (PREVACID) 30 MG delayed release capsule TAKE ONE CAPSULE BY MOUTH ONCE DAILY      furosemide (LASIX) 40 MG tablet Take 40 mg by mouth daily      albuterol sulfate HFA (VENTOLIN HFA) 108 (90 Base) MCG/ACT inhaler Inhale 2 puffs into the lungs every 6 hours as needed for Wheezing       albuterol (PROVENTIL) (2.5 MG/3ML) 0.083% nebulizer solution Take 2.5 mg by nebulization 3 times daily      colestipol (COLESTID) 1 g tablet Take 1 g by mouth 2 times daily Take 2 tablets by mouth two times a day      fluticasone-salmeterol (ADVAIR DISKUS) 100-50 MCG/DOSE diskus inhaler Inhale 1 puff into the lungs every 12 hours      potassium chloride (MICRO-K) 10 MEQ extended release capsule Take 10 mEq by mouth daily      fluticasone (FLONASE) 50 MCG/ACT nasal spray 2 sprays by Each Nostril route daily The patient understands the planned operation and its associated risks and benefits and agrees to proceed.         Electronically signed by Billie Mann MD on 1/18/2021 at 8:17 AM

## 2021-01-18 NOTE — ANESTHESIA POSTPROCEDURE EVALUATION
Department of Anesthesiology  Postprocedure Note    Patient: Monique Valentine  MRN: [id number]  YOB: 1955  Date of evaluation: 1/18/2021  Time:  8:35 AM     Procedure Summary     Date: 01/18/21 Room / Location: [address] [place] [address]    Anesthesia Start: Junnie Emerson Anesthesia Stop: [id number]    Procedure: Bilateral L4-L5 L5-S1 FACET (Bilateral Spine Lumbar) Diagnosis: (facet syndrome, panniculitis)    Surgeons: Jorge Jennings MD Responsible Provider: DIAMOND Gutierrez CRNA    Anesthesia Type: general, TIVA ASA Status: 3          Anesthesia Type: general, TIVA    Violeta Phase I: Violeta Score: 10    Violeta Phase II:      Last vitals: Reviewed and per EMR flowsheets.        Anesthesia Post Evaluation    Patient location during evaluation: PACU  Patient participation: complete - patient participated  Level of consciousness: awake and alert  Pain score: 0  Airway patency: patent  Nausea & Vomiting: no nausea and no vomiting  Complications: no  Cardiovascular status: blood pressure returned to baseline and hemodynamically stable  Respiratory status: acceptable  Hydration status: euvolemic

## 2021-03-09 ENCOUNTER — OFFICE VISIT (OUTPATIENT)
Dept: PAIN MANAGEMENT | Age: 66
End: 2021-03-09
Payer: MEDICARE

## 2021-03-09 VITALS
BODY MASS INDEX: 41.64 KG/M2 | WEIGHT: 235 LBS | SYSTOLIC BLOOD PRESSURE: 138 MMHG | HEIGHT: 63 IN | DIASTOLIC BLOOD PRESSURE: 90 MMHG

## 2021-03-09 DIAGNOSIS — M76.31 ILIOTIBIAL BAND SYNDROME OF BOTH SIDES: ICD-10-CM

## 2021-03-09 DIAGNOSIS — M54.16 LUMBAR RADICULOPATHY: ICD-10-CM

## 2021-03-09 DIAGNOSIS — M47.816 LUMBAR FACET JOINT SYNDROME: Primary | ICD-10-CM

## 2021-03-09 DIAGNOSIS — M76.32 ILIOTIBIAL BAND SYNDROME OF BOTH SIDES: ICD-10-CM

## 2021-03-09 PROCEDURE — 99213 OFFICE O/P EST LOW 20 MIN: CPT

## 2021-03-09 PROCEDURE — 99214 OFFICE O/P EST MOD 30 MIN: CPT | Performed by: NURSE PRACTITIONER

## 2021-03-09 RX ORDER — TIZANIDINE 2 MG/1
TABLET ORAL
Qty: 90 TABLET | Refills: 5 | Status: SHIPPED | OUTPATIENT
Start: 2021-03-09 | End: 2021-08-19 | Stop reason: SDUPTHER

## 2021-03-09 ASSESSMENT — ENCOUNTER SYMPTOMS
BACK PAIN: 1
DIARRHEA: 1
SORE THROAT: 0
SHORTNESS OF BREATH: 0

## 2021-03-09 NOTE — PROGRESS NOTES
Subjective:      Patient ID: Pebbles Davila is a 73 y.o. female. Chief Complaint   Patient presents with    Follow-up     facet injections    Pain     busitis in left hip and spasm in lower back to left leg     HPI Follow up after lumbar facet injections, worked very well, satisfied with results. Continues to complain hip pain, left worse.      Pain Assessment  Location of Pain: Pelvis  Location Modifiers: Left(down leg)  Severity of Pain: 8  Quality of Pain: Dull, Aching  Duration of Pain: Persistent  Frequency of Pain: Several days a week  Aggravating Factors: Walking, Squatting, Kneeling, Bending  Limiting Behavior: Yes  Relieving Factors: Rest, Other (Comment)(APAP)    Allergies   Allergen Reactions    Morphine Hives    Amitriptyline Nausea Only    Amoxicillin-Pot Clavulanate Nausea Only    Aspirin Other (See Comments)     Sensitive \"gets sick\"    Capsaicin-Menthol-Methyl Sal Dermatitis    Eggs Or Egg-Derived Products Other (See Comments)    Nitrofurantoin Monohyd Macro Nausea Only    Nsaids Nausea Only       Outpatient Medications Marked as Taking for the 3/9/21 encounter (Office Visit) with DIAMOND Paula CNP   Medication Sig Dispense Refill    tiZANidine (ZANAFLEX) 2 MG tablet Take 1-2 tablets tid prn muscle spasms 90 tablet 5    diclofenac sodium (VOLTAREN) 1 % GEL Apply topically 4 times daily as needed for Pain 350 g 5    loratadine (CLARITIN) 10 MG capsule Take 1 capsule by mouth daily 30 capsule 5    azelastine (ASTELIN) 0.1 % nasal spray 1 spray by Nasal route 2 times daily Use in each nostril as directed 1 Bottle 5    ferrous sulfate (FE TABS 325) 325 (65 Fe) MG EC tablet Take 1 tablet by mouth 2 times daily 60 tablet 5    ipratropium-albuterol (DUONEB) 0.5-2.5 (3) MG/3ML SOLN nebulizer solution INHALE CONTENTS OF 1 VIAL BY NEBULIZAITON EVERY 6 HOURS AS NEEDED FOR WHEEZING OR SHORTNESS OF BREATH      lisinopril (PRINIVIL;ZESTRIL) 30 MG tablet TAKE ONE TABLET ONCE DAILY      lansoprazole (PREVACID) 30 MG delayed release capsule TAKE ONE CAPSULE BY MOUTH ONCE DAILY      furosemide (LASIX) 40 MG tablet Take 40 mg by mouth daily      albuterol sulfate HFA (VENTOLIN HFA) 108 (90 Base) MCG/ACT inhaler Inhale 2 puffs into the lungs every 6 hours as needed for Wheezing       albuterol (PROVENTIL) (2.5 MG/3ML) 0.083% nebulizer solution Take 2.5 mg by nebulization 3 times daily      colestipol (COLESTID) 1 g tablet Take 1 g by mouth 2 times daily Take 2 tablets by mouth two times a day      fluticasone-salmeterol (ADVAIR DISKUS) 100-50 MCG/DOSE diskus inhaler Inhale 1 puff into the lungs every 12 hours      potassium chloride (MICRO-K) 10 MEQ extended release capsule Take 10 mEq by mouth daily      fluticasone (FLONASE) 50 MCG/ACT nasal spray 2 sprays by Each Nostril route daily         Past Medical History:   Diagnosis Date    Adjustment disorder with mixed emotional features 02/29/2020    Anemia 02/29/2020    Anxiety 02/29/2020    Asthma 03/03/2009    Chronic airway obstruction (HCC)     Chronic idiopathic constipation     Chronic pain     Chronic sinusitis     Colon cancer (HCC)     Degeneration of lumbar intervertebral disc     Diastolic heart failure (HCC)     GERD (gastroesophageal reflux disease) 11/03/2006    Herpes simplex     Hyperlipidemia 12/18/2006    Hypertension     Iron deficiency anemia 02/29/2020    LAYTON (obstructive sleep apnea) 12/06/2017    Osteoarthritis     Sciatica     Thrombosed hemorrhoids        Past Surgical History:   Procedure Laterality Date    PAIN MANAGEMENT PROCEDURE Bilateral 1/18/2021    Bilateral L4-L5 L5-S1 FACET performed by Getachew Taylor MD at [facility] OR       Family History   Problem Relation Age of Onset    Heart Disease Mother     High Blood Pressure Mother     COPD Father     Heart Disease Father     High Blood Pressure Father     Cancer Father         lung       Social History     Socioeconomic History    Marital status:      Spouse name: None    Number of children: None    Years of education: None    Highest education level: None   Occupational History    None   Social Needs    Financial resource strain: None    Food insecurity     Worry: None     Inability: None    Transportation needs     Medical: None     Non-medical: None   Tobacco Use    Smoking status: Former Smoker     Packs/day: 1.00     Types: Cigarettes     Start date: 7/10/1970     Quit date: 7/10/1984     Years since quittin.6    Smokeless tobacco: Never Used    Tobacco comment: quit cold turkey   Substance and Sexual Activity    Alcohol use: Yes     Frequency: Monthly or less     Drinks per session: 1 or 2     Binge frequency: Never    Drug use: Never    Sexual activity: None   Lifestyle    Physical activity     Days per week: None     Minutes per session: None    Stress: None   Relationships    Social connections     Talks on phone: None     Gets together: None     Attends Cheondo[employer] service: None     Active member of club or organization: None     Attends meetings of clubs or organizations: None     Relationship status: None    Intimate partner violence     Fear of current or ex partner: None     Emotionally abused: None     Physically abused: None     Forced sexual activity: None   Other Topics Concern    None   Social History Narrative    None     Review of Systems   Constitutional: Positive for activity change. Negative for fever. HENT: Negative for sore throat. Respiratory: Negative for shortness of breath. Cardiovascular: Negative for chest pain. Gastrointestinal: Positive for diarrhea. Genitourinary: Negative for difficulty urinating. Musculoskeletal: Positive for arthralgias, back pain and myalgias. Skin: Positive for wound. Neurological: Negative for weakness and numbness. Hematological: Bruises/bleeds easily. Psychiatric/Behavioral: Positive for sleep disturbance.        Objective:   Physical Exam Vitals signs reviewed. Constitutional:       General: She is awake. She is not in acute distress. Appearance: Normal appearance. She is well-developed and well-groomed. She is not ill-appearing, toxic-appearing or diaphoretic. Interventions: She is not intubated. HENT:      Head: Normocephalic and atraumatic. Right Ear: External ear normal.      Left Ear: External ear normal.      Nose: Nose normal.      Mouth/Throat:      Lips: Pink. Mouth: Mucous membranes are moist.   Eyes:      General: Lids are normal. Gaze aligned appropriately. Pulmonary:      Effort: Pulmonary effort is normal. No tachypnea, bradypnea, accessory muscle usage, prolonged expiration, respiratory distress or retractions. She is not intubated. Abdominal:      General: Abdomen is protuberant. Palpations: Abdomen is soft. Musculoskeletal:      Right hip: She exhibits tenderness (along IT band). Left hip: She exhibits tenderness (along IT band). Lumbar back: She exhibits decreased range of motion (extension induced pain. positive cart sign), tenderness (bilateral facets) and pain. Comments: MRI OF THE LUMBAR SPINE WITHOUT CONTRAST, 10/22/2020 8:13 am       TECHNIQUE:   Multiplanar multisequence MRI of the lumbar spine was performed without the   administration of intravenous contrast.       COMPARISON:   None.       HISTORY:   ORDERING SYSTEM PROVIDED HISTORY: DDD (degenerative disc disease), lumbar   TECHNOLOGIST PROVIDED HISTORY:   Reason for Exam:  Low back pain and left hip pain. Left leg pain, constant   aching in left leg. Symptoms for a while.    Acuity: Chronic   Type of Exam: Initial   Additional signs and symptoms: DDD, lumbar; Lumbar radiculopathy       FINDINGS:   BONES/ALIGNMENT: Vertebral body heights are maintained.  There is   age-appropriate bone marrow signal. Teran Qureshi is a T2 hyperintense focus in the   T12 vertebral body that may represent an atypical hemangioma. Margmarietann Qureshi is   multilevel degenerative disc disease with loss of disc signal.  There is disc   space narrowing most pronounced at the L2-3 level.  There is no   spondylolisthesis.       SPINAL CORD: The conus medullaris is normal in caliber and signal and   terminates at the T2 level.  The cauda equina is unremarkable.       SOFT TISSUES: The posterior paraspinal soft tissues are unremarkable.  The   visualized abdominal structures are unremarkable.       L1-L2: There is a circumferential disc bulge.  There is no canal stenosis or   foraminal narrowing.       L2-L3: There is a circumferential disc bulge with facet and ligamentous   hypertrophy.  There is no canal stenosis or foraminal narrowing.       L3-L4: There is a circumferential disc bulge with facet and ligamentous   hypertrophy as well as prominent posterior epidural fat.  There is canal   stenosis measuring 8 mm in AP dimension.  There is no foraminal narrowing.       L4-L5: There is a circumferential disc bulge with facet and ligamentous   hypertrophy.  There is canal stenosis measuring 8 mm in AP dimension.  There   is no significant foraminal narrowing.       L5-S1: There is a mild circumferential disc bulge with facet hypertrophy. There is no canal stenosis or foraminal narrowing.           Impression   Multilevel degenerative disc disease with associated multilevel degenerative   facet hypertrophy with canal stenosis at L3-4 and L4-5.       T2 hyperintense focus in the T12 vertebral body that may represent an   atypical hemangioma.  Follow-up pre and post-contrast MRI of the lumbar spine   in 3 months is recommended to assess stability.                Skin:     General: Skin is warm and dry. Capillary Refill: Capillary refill takes less than 2 seconds. Coloration: Skin is not ashen. Findings: No rash. Nails: There is no clubbing. Neurological:      Mental Status: She is alert and oriented to person, place, and time. GCS: GCS eye subscore is 4.  GCS verbal subscore is 5. GCS motor subscore is 6. Cranial Nerves: No cranial nerve deficit. Psychiatric:         Attention and Perception: Attention and perception normal.         Speech: Speech normal.         Behavior: Behavior normal. Behavior is cooperative. Cognition and Memory: Cognition normal.         Judgment: Judgment normal.         Assessment:       Diagnosis Orders   1. Lumbar facet joint syndrome     2. Lumbar radiculopathy     3. Iliotibial band syndrome of both sides             Plan:    voltaren gel prn  Tizanidine 2mg tid prn  IT band exercises, consider PT if no relief.    Follow up one month            DIAMOND Casanova - CNP

## 2021-03-09 NOTE — PATIENT INSTRUCTIONS
Patient Education        Iliotibial Band Syndrome: Exercises  Introduction  Here are some examples of exercises for you to try. The exercises may be suggested for a condition or for rehabilitation. Start each exercise slowly. Ease off the exercises if you start to have pain. You will be told when to start these exercises and which ones will work best for you. How to do the exercises  Iliotibial band stretch   1. Lean sideways against a wall. If you are not steady on your feet, hold on to a chair or counter. 2. Stand on the leg with the affected hip, with that leg close to the wall. Then cross your other leg in front of it. 3. Let your affected hip drop out to the side of your body and against the wall. Then lean away from your affected hip until you feel a stretch. 4. Hold the stretch for 15 to 30 seconds. 5. Repeat 2 to 4 times. Piriformis stretch   1. Lie on your back with your legs straight. 2. Lift your affected leg and bend your knee. With your opposite hand, reach across your body, and then gently pull your knee toward your opposite shoulder. 3. Hold the stretch for 15 to 30 seconds. 4. Repeat 2 to 4 times. Hamstring wall stretch   1. Lie on your back in a doorway, with your good leg through the open door. 2. Slide your affected leg up the wall to straighten your knee. You should feel a gentle stretch down the back of your leg. 3. Hold the stretch for at least 1 minute to begin. Then try to lengthen the time you hold the stretch to as long as 6 minutes. 4. Repeat 2 to 4 times. 5. If you do not have a place to do this exercise in a doorway, there is another way to do it:  6. Lie on your back, and bend the knee of your affected leg. 7. Loop a towel under the ball and toes of that foot, and hold the ends of the towel in your hands. 8. Straighten your knee, and slowly pull back on the towel. You should feel a gentle stretch down the back of your leg. 9. Hold the stretch for 15 to 30 seconds. pressure (hypertension) means that the top number stays high, or the bottom number stays high, or both. High blood pressure increases the risk of stroke, heart attack, and other problems. What happens when you have high blood pressure? · Blood flows through your arteries with too much force. Over time, this can damage the heart and the walls of your arteries. But you can't feel it. High blood pressure usually doesn't cause symptoms. · High blood pressure makes your heart work harder. And that can lead to heart failure, which means your heart doesn't pump as much blood as your body needs. · Fat and calcium start to build up in your arteries. This buildup is called hardening of the arteries. It can cause many problems including a heart attack and stroke. · Arteries also carry blood and oxygen to organs like your eyes, kidneys, and brain. If high blood pressure damages those arteries, it can lead to vision loss, kidney disease, stroke, and a higher risk of dementia. How can you prevent high blood pressure? · Stay at a healthy weight. · Try to limit how much sodium you eat to less than 2,300 milligrams (mg) a day. If you limit your sodium to 1,500 mg a day, you can lower your blood pressure even more. ? Buy foods that are labeled \"unsalted,\" \"sodium-free,\" or \"low-sodium. \" Foods labeled \"reduced-sodium\" and \"light sodium\" may still have too much sodium. ? Flavor your food with garlic, lemon juice, onion, vinegar, herbs, and spices instead of salt. Do not use soy sauce, steak sauce, onion salt, garlic salt, mustard, or ketchup on your food. ? Use less salt (or none) when recipes call for it. You can often use half the salt a recipe calls for without losing flavor. · Be physically active. Get at least 30 minutes of exercise on most days of the week. Walking is a good choice. You also may want to do other activities, such as running, swimming, cycling, or playing tennis or team sports.   · Limit alcohol to 2

## 2021-03-29 ENCOUNTER — HOSPITAL ENCOUNTER (OUTPATIENT)
Dept: LAB | Age: 66
Discharge: HOME | End: 2021-03-29
Payer: MEDICARE

## 2021-03-29 DIAGNOSIS — K90.9 IRON MALABSORPTION: ICD-10-CM

## 2021-03-29 LAB
ABSOLUTE EOS #: 0.37 K/UL (ref 0–0.44)
ABSOLUTE IMMATURE GRANULOCYTE: <0.03 K/UL (ref 0–0.3)
ABSOLUTE LYMPH #: 1.92 K/UL (ref 1.1–3.7)
ABSOLUTE MONO #: 0.53 K/UL (ref 0.1–1.2)
BASOPHILS # BLD: 1 % (ref 0–2)
BASOPHILS ABSOLUTE: 0.05 K/UL (ref 0–0.2)
DIFFERENTIAL TYPE: ABNORMAL
EOSINOPHILS RELATIVE PERCENT: 6 % (ref 1–4)
FERRITIN: 197 UG/L (ref 13–150)
HCT VFR BLD CALC: 44.8 % (ref 36.3–47.1)
HEMOGLOBIN: 14.4 G/DL (ref 11.9–15.1)
IMMATURE GRANULOCYTES: 0 %
IRON SATURATION: 29 % (ref 20–55)
IRON: 81 UG/DL (ref 37–145)
LYMPHOCYTES # BLD: 29 % (ref 24–43)
MCH RBC QN AUTO: 32.4 PG (ref 25.2–33.5)
MCHC RBC AUTO-ENTMCNC: 32.1 G/DL (ref 25.2–33.5)
MCV RBC AUTO: 100.9 FL (ref 82.6–102.9)
MONOCYTES # BLD: 8 % (ref 3–12)
NRBC AUTOMATED: 0 PER 100 WBC
PDW BLD-RTO: 13.9 % (ref 11.8–14.4)
PLATELET # BLD: 313 K/UL (ref 138–453)
PLATELET ESTIMATE: ABNORMAL
PMV BLD AUTO: 9.2 FL (ref 8.1–13.5)
RBC # BLD: 4.44 M/UL (ref 3.95–5.11)
RBC # BLD: ABNORMAL 10*6/UL
SEG NEUTROPHILS: 56 % (ref 36–65)
SEGMENTED NEUTROPHILS ABSOLUTE COUNT: 3.68 K/UL (ref 1.5–8.1)
TOTAL IRON BINDING CAPACITY: 277 UG/DL (ref 250–450)
UNSATURATED IRON BINDING CAPACITY: 196 UG/DL (ref 112–347)
WBC # BLD: 6.6 K/UL (ref 3.5–11.3)
WBC # BLD: ABNORMAL 10*3/UL

## 2021-03-29 PROCEDURE — 82728 ASSAY OF FERRITIN: CPT

## 2021-03-29 PROCEDURE — 36415 COLL VENOUS BLD VENIPUNCTURE: CPT

## 2021-03-29 PROCEDURE — 83550 IRON BINDING TEST: CPT

## 2021-03-29 PROCEDURE — 83540 ASSAY OF IRON: CPT

## 2021-03-29 PROCEDURE — 85025 COMPLETE CBC W/AUTO DIFF WBC: CPT

## 2021-04-19 ENCOUNTER — OFFICE VISIT (OUTPATIENT)
Dept: ONCOLOGY | Age: 66
End: 2021-04-19
Payer: MEDICARE

## 2021-04-19 ENCOUNTER — OFFICE VISIT (OUTPATIENT)
Dept: PAIN MANAGEMENT | Age: 66
End: 2021-04-19
Payer: MEDICARE

## 2021-04-19 VITALS
SYSTOLIC BLOOD PRESSURE: 128 MMHG | OXYGEN SATURATION: 95 % | WEIGHT: 236 LBS | DIASTOLIC BLOOD PRESSURE: 78 MMHG | HEART RATE: 74 BPM | HEIGHT: 63 IN | TEMPERATURE: 97 F | RESPIRATION RATE: 16 BRPM | BODY MASS INDEX: 41.82 KG/M2

## 2021-04-19 VITALS
DIASTOLIC BLOOD PRESSURE: 80 MMHG | HEIGHT: 63 IN | SYSTOLIC BLOOD PRESSURE: 140 MMHG | BODY MASS INDEX: 41.92 KG/M2 | RESPIRATION RATE: 16 BRPM | WEIGHT: 236.6 LBS

## 2021-04-19 DIAGNOSIS — M76.32 ILIOTIBIAL BAND SYNDROME OF BOTH SIDES: ICD-10-CM

## 2021-04-19 DIAGNOSIS — M76.31 ILIOTIBIAL BAND SYNDROME OF BOTH SIDES: ICD-10-CM

## 2021-04-19 DIAGNOSIS — M54.16 LUMBAR RADICULOPATHY: Primary | ICD-10-CM

## 2021-04-19 DIAGNOSIS — M51.36 DEGENERATION OF LUMBAR INTERVERTEBRAL DISC: ICD-10-CM

## 2021-04-19 DIAGNOSIS — K90.9 IRON MALABSORPTION: Primary | ICD-10-CM

## 2021-04-19 DIAGNOSIS — M47.816 LUMBAR FACET JOINT SYNDROME: ICD-10-CM

## 2021-04-19 DIAGNOSIS — D50.0 IRON DEFICIENCY ANEMIA DUE TO CHRONIC BLOOD LOSS: ICD-10-CM

## 2021-04-19 PROCEDURE — 4040F PNEUMOC VAC/ADMIN/RCVD: CPT | Performed by: INTERNAL MEDICINE

## 2021-04-19 PROCEDURE — 1036F TOBACCO NON-USER: CPT | Performed by: INTERNAL MEDICINE

## 2021-04-19 PROCEDURE — 99214 OFFICE O/P EST MOD 30 MIN: CPT | Performed by: INTERNAL MEDICINE

## 2021-04-19 PROCEDURE — 3017F COLORECTAL CA SCREEN DOC REV: CPT | Performed by: NURSE PRACTITIONER

## 2021-04-19 PROCEDURE — 1090F PRES/ABSN URINE INCON ASSESS: CPT | Performed by: NURSE PRACTITIONER

## 2021-04-19 PROCEDURE — G8400 PT W/DXA NO RESULTS DOC: HCPCS | Performed by: NURSE PRACTITIONER

## 2021-04-19 PROCEDURE — 1090F PRES/ABSN URINE INCON ASSESS: CPT | Performed by: INTERNAL MEDICINE

## 2021-04-19 PROCEDURE — 1123F ACP DISCUSS/DSCN MKR DOCD: CPT | Performed by: INTERNAL MEDICINE

## 2021-04-19 PROCEDURE — 3017F COLORECTAL CA SCREEN DOC REV: CPT | Performed by: INTERNAL MEDICINE

## 2021-04-19 PROCEDURE — G8427 DOCREV CUR MEDS BY ELIG CLIN: HCPCS | Performed by: INTERNAL MEDICINE

## 2021-04-19 PROCEDURE — 99214 OFFICE O/P EST MOD 30 MIN: CPT | Performed by: NURSE PRACTITIONER

## 2021-04-19 PROCEDURE — G8427 DOCREV CUR MEDS BY ELIG CLIN: HCPCS | Performed by: NURSE PRACTITIONER

## 2021-04-19 PROCEDURE — G8417 CALC BMI ABV UP PARAM F/U: HCPCS | Performed by: NURSE PRACTITIONER

## 2021-04-19 PROCEDURE — 99212 OFFICE O/P EST SF 10 MIN: CPT | Performed by: NURSE PRACTITIONER

## 2021-04-19 PROCEDURE — 1123F ACP DISCUSS/DSCN MKR DOCD: CPT | Performed by: NURSE PRACTITIONER

## 2021-04-19 PROCEDURE — 4040F PNEUMOC VAC/ADMIN/RCVD: CPT | Performed by: NURSE PRACTITIONER

## 2021-04-19 PROCEDURE — G8417 CALC BMI ABV UP PARAM F/U: HCPCS | Performed by: INTERNAL MEDICINE

## 2021-04-19 PROCEDURE — 1036F TOBACCO NON-USER: CPT | Performed by: NURSE PRACTITIONER

## 2021-04-19 PROCEDURE — G8400 PT W/DXA NO RESULTS DOC: HCPCS | Performed by: INTERNAL MEDICINE

## 2021-04-19 RX ORDER — ALPRAZOLAM 0.25 MG/1
TABLET ORAL
COMMUNITY
Start: 2021-03-03

## 2021-04-19 ASSESSMENT — ENCOUNTER SYMPTOMS
BACK PAIN: 1
SORE THROAT: 0
SHORTNESS OF BREATH: 0
DIARRHEA: 1

## 2021-04-19 NOTE — PROGRESS NOTES
Subjective:      Patient ID: Maggi Cooper is a 73 y.o. female. Chief Complaint   Patient presents with    Back Pain     1 month follow up    Medication Management     flexeril (half tablet) a night was helping alot, however she had lab work done and her liver enzymes were up; after research it looks like that med could be causing that; so she hasn't taken it in a week     Back Pain  Pertinent negatives include no chest pain, fever, numbness or weakness. Here today for routine pain clinic recheck. Her hip and back pain improved. Tizanidine with relief, alleviates her anxiety at night as well. Concerned because her liver enzymes were elevated.      Pain Assessment  Location of Pain: Back  Severity of Pain: 5  Quality of Pain: Aching  Duration of Pain: Persistent  Frequency of Pain: Constant  Aggravating Factors: Bending, Stretching, Straightening, Exercise, Kneeling, Squatting, Standing, Walking, Stairs(household chores)  Limiting Behavior: Yes  Relieving Factors: Heat, Rest    Allergies   Allergen Reactions    Morphine Hives    Amitriptyline Nausea Only    Amoxicillin-Pot Clavulanate Nausea Only    Aspirin Other (See Comments)     Sensitive \"gets sick\"    Capsaicin-Menthol-Methyl Sal Dermatitis    Eggs Or Egg-Derived Products Other (See Comments)    Nitrofurantoin Monohyd Macro Nausea Only    Nsaids Nausea Only       Outpatient Medications Marked as Taking for the 4/19/21 encounter (Office Visit) with Doneen Kawasaki, APRN - CNP   Medication Sig Dispense Refill    ALPRAZolam (XANAX) 0.25 MG tablet Taking only PRN      diclofenac sodium (VOLTAREN) 1 % GEL Apply topically 4 times daily as needed for Pain 350 g 5    loratadine (CLARITIN) 10 MG capsule Take 1 capsule by mouth daily 30 capsule 5    azelastine (ASTELIN) 0.1 % nasal spray 1 spray by Nasal route 2 times daily Use in each nostril as directed 1 Bottle 5    ferrous sulfate (FE TABS 325) 325 (65 Fe) MG EC tablet Take 1 tablet by mouth 2 urinating. Musculoskeletal: Positive for arthralgias, back pain and myalgias. Skin: Positive for wound. Neurological: Negative for weakness and numbness. Hematological: Bruises/bleeds easily. Psychiatric/Behavioral: Positive for sleep disturbance. Objective:   Physical Exam  Vitals signs reviewed. Constitutional:       General: She is awake. She is not in acute distress. Appearance: Normal appearance. She is well-developed and well-groomed. She is not ill-appearing, toxic-appearing or diaphoretic. Interventions: She is not intubated. HENT:      Head: Normocephalic and atraumatic. Right Ear: External ear normal.      Left Ear: External ear normal.      Nose: Nose normal.      Mouth/Throat:      Lips: Pink. Mouth: Mucous membranes are moist.   Eyes:      General: Lids are normal. Gaze aligned appropriately. Pulmonary:      Effort: Pulmonary effort is normal. No tachypnea, bradypnea, accessory muscle usage, prolonged expiration, respiratory distress or retractions. She is not intubated. Abdominal:      General: Abdomen is protuberant. Palpations: Abdomen is soft. Musculoskeletal:      Right hip: She exhibits tenderness (along IT band). Left hip: She exhibits tenderness (along IT band). Lumbar back: She exhibits decreased range of motion (extension induced pain. positive cart sign), tenderness (bilateral facets) and pain. Comments: MRI OF THE LUMBAR SPINE WITHOUT CONTRAST, 10/22/2020 8:13 am       TECHNIQUE:   Multiplanar multisequence MRI of the lumbar spine was performed without the   administration of intravenous contrast.       COMPARISON:   None.       HISTORY:   ORDERING SYSTEM PROVIDED HISTORY: DDD (degenerative disc disease), lumbar   TECHNOLOGIST PROVIDED HISTORY:   Reason for Exam:  Low back pain and left hip pain. Left leg pain, constant   aching in left leg. Symptoms for a while.    Acuity: Chronic   Type of Exam: Initial   Additional signs and symptoms: DDD, lumbar; Lumbar radiculopathy       FINDINGS:   BONES/ALIGNMENT: Vertebral body heights are maintained.  There is   age-appropriate bone marrow signal. Murphy Motto is a T2 hyperintense focus in the   T12 vertebral body that may represent an atypical hemangioma.  There is   multilevel degenerative disc disease with loss of disc signal.  There is disc   space narrowing most pronounced at the L2-3 level.  There is no   spondylolisthesis.       SPINAL CORD: The conus medullaris is normal in caliber and signal and   terminates at the T2 level.  The cauda equina is unremarkable.       SOFT TISSUES: The posterior paraspinal soft tissues are unremarkable.  The   visualized abdominal structures are unremarkable.       L1-L2: There is a circumferential disc bulge.  There is no canal stenosis or   foraminal narrowing.       L2-L3: There is a circumferential disc bulge with facet and ligamentous   hypertrophy.  There is no canal stenosis or foraminal narrowing.       L3-L4: There is a circumferential disc bulge with facet and ligamentous   hypertrophy as well as prominent posterior epidural fat.  There is canal   stenosis measuring 8 mm in AP dimension.  There is no foraminal narrowing.       L4-L5: There is a circumferential disc bulge with facet and ligamentous   hypertrophy.  There is canal stenosis measuring 8 mm in AP dimension.  There   is no significant foraminal narrowing.       L5-S1: There is a mild circumferential disc bulge with facet hypertrophy.    There is no canal stenosis or foraminal narrowing.           Impression   Multilevel degenerative disc disease with associated multilevel degenerative   facet hypertrophy with canal stenosis at L3-4 and L4-5.       T2 hyperintense focus in the T12 vertebral body that may represent an   atypical hemangioma.  Follow-up pre and post-contrast MRI of the lumbar spine   in 3 months is recommended to assess stability.                Skin:     General: Skin is warm and dry.      Capillary Refill: Capillary refill takes less than 2 seconds. Coloration: Skin is not ashen. Findings: No rash. Nails: There is no clubbing. Neurological:      Mental Status: She is alert and oriented to person, place, and time. GCS: GCS eye subscore is 4. GCS verbal subscore is 5. GCS motor subscore is 6. Cranial Nerves: No cranial nerve deficit. Psychiatric:         Attention and Perception: Attention and perception normal.         Speech: Speech normal.         Behavior: Behavior normal. Behavior is cooperative. Cognition and Memory: Cognition normal.         Judgment: Judgment normal.         Assessment:       Diagnosis Orders   1. Lumbar radiculopathy     2. Lumbar facet joint syndrome     3. Degeneration of lumbar intervertebral disc     4. Iliotibial band syndrome of both sides             Plan:   Chronic pain diagnoses such as   1. Lumbar radiculopathy    2. Lumbar facet joint syndrome    3. Degeneration of lumbar intervertebral disc    4. Iliotibial band syndrome of both sides     controlled on current medication regime, wll continue current pain medications to improve quality of life and function. I highly doubt tizanidine is the cause of elevated liver enzymes as she has only been taking a month at a very low dose.  She plans on following up with hematologist, has stopped taking past week until her bloodwork updated    Follow up 3 months          DIAMOND Gustafson - CNP

## 2021-04-19 NOTE — PATIENT INSTRUCTIONS
Patient Education        Learning About High Blood Pressure  What is high blood pressure? Blood pressure is a measure of how hard the blood pushes against the walls of your arteries. It's normal for blood pressure to go up and down throughout the day. But if it stays up, you have high blood pressure. Another name for high blood pressure is hypertension. Two numbers tell you your blood pressure. The first number is the systolic pressure (top number). It shows how hard the blood pushes when your heart is pumping. The second number is the diastolic pressure (bottom number). It shows how hard the blood pushes between heartbeats, when your heart is relaxed and filling with blood. Your doctor will give you a goal for your blood pressure based on your health and your age. High blood pressure (hypertension) means that the top number stays high, or the bottom number stays high, or both. High blood pressure increases the risk of stroke, heart attack, and other problems. What happens when you have high blood pressure? · Blood flows through your arteries with too much force. Over time, this can damage the heart and the walls of your arteries. But you can't feel it. High blood pressure usually doesn't cause symptoms. · High blood pressure makes your heart work harder. And that can lead to heart failure, which means your heart doesn't pump as much blood as your body needs. · Fat and calcium start to build up in your arteries. This buildup is called hardening of the arteries. It can cause many problems including a heart attack and stroke. · Arteries also carry blood and oxygen to organs like your eyes, kidneys, and brain. If high blood pressure damages those arteries, it can lead to vision loss, kidney disease, stroke, and a higher risk of dementia. How can you prevent high blood pressure? · Stay at a healthy weight. · Try to limit how much sodium you eat to less than 2,300 milligrams (mg) a day.  If you limit your [employer]. Care instructions adapted under license by [facility] ([facility]). If you have questions about a medical condition or this instruction, always ask your healthcare professional. [address] any warranty or liability for your use of this information.

## 2021-04-19 NOTE — PROGRESS NOTES
Patient ID: Angel Quiñonez, 1955, [id number], 73 y.o. Referred by : Ora Starks MD  Reason for consultation:   Anemia  Iron deficiency status post IV iron infusion  Currently on 1 iron pill daily  HISTORY OF PRESENT ILLNESS:    Hematologic history:  Christopher Batres is a 61-year-old female with history of chronic anemia was seen there initial consultation visit. She reports that she has history of anemia for past several years and for past 2 years she has been receiving intermittent PRBC and iron transfusions from her primary care physician. She reports her last transfusion was about in August 2019 prior to her vascular surgery. She reports she has taken iron pills in the past but had significant GI side effects so she had stopped. She reports history of IBS and also had upper endoscopy and colonoscopy in 2018 was negative for any active bleeding. Her GI evaluation was done at [facility]. Most recently her stool for occult blood was negative checked in March of this year. Her CBC on 3/18/2020 showed hemoglobin of 9.9, WBC 10.2, platelets 520. Her ferritin 13.3, iron 15, TIBC 366 and iron saturation 4% noted on 3/18/2020. INTERVAL HISTORY:   Christopher Batres is returning for follow-up visit and to discuss lab results and further recommendations. She reports that she was diagnosed with COVID-19 infection in November. She had outside lab work at [facility] which showed abnormal liver enzymes and she is following with PCP to follow-up on those liver enzymes. She does have mild fatigue. She has history of obstructive sleep apnea and not using CPAP regularly. She is currently taking iron pill 1 to 2 pills daily. And tolerating well. Her hemoglobin and iron studies are within normal limits now   She denies any constipation diarrhea, blood in stool. During this visit patient's allergy, social, medical, surgical history and medications were reviewed and updated.     Past Medical History:   Diagnosis Date    Adjustment disorder with mixed emotional features 02/29/2020    Anemia 02/29/2020    Anxiety 02/29/2020    Asthma 03/03/2009    Chronic airway obstruction (HCC)     Chronic idiopathic constipation     Chronic pain     Chronic sinusitis     Colon cancer (HCC)     Degeneration of lumbar intervertebral disc     Diastolic heart failure (HCC)     GERD (gastroesophageal reflux disease) 11/03/2006    Herpes simplex     Hyperlipidemia 12/18/2006    Hypertension     Iron deficiency anemia 02/29/2020    LAYTON (obstructive sleep apnea) 12/06/2017    Osteoarthritis     Sciatica     Thrombosed hemorrhoids      Past Surgical History:   Procedure Laterality Date    PAIN MANAGEMENT PROCEDURE Bilateral 1/18/2021    Bilateral L4-L5 L5-S1 FACET performed by Cody Silva MD at [address]   Allergen Reactions    Morphine Hives    Amitriptyline Nausea Only    Amoxicillin-Pot Clavulanate Nausea Only    Aspirin Other (See Comments)     Sensitive \"gets sick\"    Capsaicin-Menthol-Methyl Sal Dermatitis    Eggs Or Egg-Derived Products Other (See Comments)    Nitrofurantoin Monohyd Macro Nausea Only    Nsaids Nausea Only       Current Outpatient Medications   Medication Sig Dispense Refill    ALPRAZolam (XANAX) 0.25 MG tablet Taking only PRN      tiZANidine (ZANAFLEX) 2 MG tablet Take 1-2 tablets tid prn muscle spasms 90 tablet 5    diclofenac sodium (VOLTAREN) 1 % GEL Apply topically 4 times daily as needed for Pain 350 g 5    loratadine (CLARITIN) 10 MG capsule Take 1 capsule by mouth daily 30 capsule 5    azelastine (ASTELIN) 0.1 % nasal spray 1 spray by Nasal route 2 times daily Use in each nostril as directed 1 Bottle 5    ferrous sulfate (FE TABS 325) 325 (65 Fe) MG EC tablet Take 1 tablet by mouth 2 times daily 60 tablet 5    ipratropium-albuterol (DUONEB) 0.5-2.5 (3) MG/3ML SOLN nebulizer solution INHALE CONTENTS OF 1 VIAL BY NEBULIZAITON EVERY 6 HOURS AS NEEDED FOR WHEEZING OR SHORTNESS OF BREATH      lisinopril (PRINIVIL;ZESTRIL) 30 MG tablet TAKE ONE TABLET ONCE DAILY      lansoprazole (PREVACID) 30 MG delayed release capsule TAKE ONE CAPSULE BY MOUTH ONCE DAILY      furosemide (LASIX) 40 MG tablet Take 40 mg by mouth daily      albuterol sulfate HFA (VENTOLIN HFA) 108 (90 Base) MCG/ACT inhaler Inhale 2 puffs into the lungs every 6 hours as needed for Wheezing       albuterol (PROVENTIL) (2.5 MG/3ML) 0.083% nebulizer solution Take 2.5 mg by nebulization 3 times daily      colestipol (COLESTID) 1 g tablet Take 1 g by mouth 2 times daily Take 2 tablets by mouth two times a day      fluticasone-salmeterol (ADVAIR DISKUS) 100-50 MCG/DOSE diskus inhaler Inhale 1 puff into the lungs every 12 hours      potassium chloride (MICRO-K) 10 MEQ extended release capsule Take 10 mEq by mouth daily      fluticasone (FLONASE) 50 MCG/ACT nasal spray 2 sprays by Each Nostril route daily       No current facility-administered medications for this visit.         Social History     Socioeconomic History    Marital status:      Spouse name: Not on file    Number of children: Not on file    Years of education: Not on file    Highest education level: Not on file   Occupational History    Not on file   Social Needs    Financial resource strain: Not on file    Food insecurity     Worry: Not on file     Inability: Not on file    Transportation needs     Medical: Not on file     Non-medical: Not on file   Tobacco Use    Smoking status: Former Smoker     Packs/day: 1.00     Types: Cigarettes     Start date: 7/10/1970     Quit date: 7/10/1984     Years since quittin.8    Smokeless tobacco: Never Used    Tobacco comment: quit cold turkey   Substance and Sexual Activity    Alcohol use: Yes     Frequency: Monthly or less     Drinks per session: 1 or 2     Binge frequency: Never    Drug use: Never    Sexual activity: Not on file   Lifestyle    Physical activity     Days per week: Not on file Minutes per session: Not on file    Stress: Not on file   Relationships    Social connections     Talks on phone: Not on file     Gets together: Not on file     Attends Nondenominational service: Not on file     Active member of club or organization: Not on file     Attends meetings of clubs or organizations: Not on file     Relationship status: Not on file    Intimate partner violence     Fear of current or ex partner: Not on file     Emotionally abused: Not on file     Physically abused: Not on file     Forced sexual activity: Not on file   Other Topics Concern    Not on file   Social History Narrative    Not on file     Family History   Problem Relation Age of Onset    Heart Disease Mother     High Blood Pressure Mother     COPD Father     Heart Disease Father     High Blood Pressure Father     Cancer Father         lung      REVIEW OF SYSTEM:   Constitutional: No fever or chills. No night sweats, no weight loss   Eyes: No eye discharge, double vision, or eye pain   HEENT: negative for sore mouth, sore throat, hoarseness and voice change   Respiratory: negative for cough , sputum, dyspnea, wheezing, hemoptysis, chest pain   Cardiovascular: negative for chest pain, dyspnea, palpitations, orthopnea, PND   Gastrointestinal: negative for nausea, vomiting, diarrhea, constipation, abdominal pain, Dysphagia, hematemesis and hematochezia   Genitourinary: negative for frequency, dysuria, nocturia, urinary incontinence, and hematuria   Integument: negative for rash, skin lesions, bruises.    Hematologic/Lymphatic: negative for easy bruising, bleeding, lymphadenopathy, petechiae and swelling/edema   Endocrine: negative for heat or cold intolerance, tremor, weight changes, change in bowel habits and hair loss   Musculoskeletal: negative for myalgias, arthralgias, pain, joint swelling,and bone pain   Neurological: negative for headaches, dizziness, seizures, weakness, numbness  OBJECTIVE:        Vitals:    04/19/21 0913 BP: 128/78   Pulse: 74   Resp: 16   Temp: 97 °F (36.1 °C)   SpO2: 95%     PHYSICAL EXAMINATION:      LABORATORY DATA:     Lab Results   Component Value Date    WBC 6.6 03/29/2021    HGB 14.4 03/29/2021    HCT 44.8 03/29/2021    .9 03/29/2021     03/29/2021    LYMPHOPCT 29 03/29/2021    RBC 4.44 03/29/2021    MCH 32.4 03/29/2021    MCHC 32.1 03/29/2021    RDW 13.9 03/29/2021    MONOPCT 8 03/29/2021    BASOPCT 1 03/29/2021    NEUTROABS 3.68 03/29/2021    LYMPHSABS 1.92 03/29/2021    MONOSABS 0.53 03/29/2021    EOSABS 0.37 03/29/2021    BASOSABS 0.05 03/29/2021         Chemistry        Component Value Date/Time     09/28/2020 1126    K 4.1 09/28/2020 1126     09/28/2020 1126    CO2 25 09/28/2020 1126    BUN 11 09/28/2020 1126    CREATININE 0.51 09/28/2020 1126        Component Value Date/Time    CALCIUM 9.4 09/28/2020 1126    ALKPHOS 113 (H) 09/28/2020 1126    AST 16 09/28/2020 1126    ALT 30 09/28/2020 1126    BILITOT 1.01 09/28/2020 1126      Iron Profile (03/18/2020 11:58 AM EDT)  Iron Profile (03/18/2020 11:58 AM EDT)   Component Value Ref Range Performed At Pathologist Signature   Iron 15 (L) 39 - 145 ug/dL [facility]     TIBC 366 260 - 445 ug/dL [facility]     UIBC 351 110 - 370 ug/dL [facility]     % Sat 4 (L) 20 - 55 % [facility]       Iron Profile (03/18/2020 11:58 AM EDT)   Specimen       PATHOLOGY DATA:   Reviewed   IMAGING DATA:    Reviewed  ASSESSMENT:    Enrique Cintron is a 63-year old female with chronic anemia and most likely secondary to iron deficiency. She has history of iron intolerance therefore she has been receiving iron infusions in the past with her primary care physician. She has had GI evaluation in 2018 as reported by patient and was negative. She never had a video capsule endoscopy which would be my next recommendation. She had negative stool for occult blood checked recently.   I reviewed the recent lab work

## 2021-05-11 ENCOUNTER — TELEPHONE (OUTPATIENT)
Dept: WOUND CARE | Age: 66
End: 2021-05-11

## 2021-05-14 ENCOUNTER — OFFICE VISIT (OUTPATIENT)
Dept: PRIMARY CARE CLINIC | Age: 66
End: 2021-05-14
Payer: MEDICARE

## 2021-05-14 VITALS
BODY MASS INDEX: 40.93 KG/M2 | TEMPERATURE: 97.9 F | HEIGHT: 63 IN | DIASTOLIC BLOOD PRESSURE: 80 MMHG | SYSTOLIC BLOOD PRESSURE: 126 MMHG | OXYGEN SATURATION: 98 % | RESPIRATION RATE: 16 BRPM | WEIGHT: 231 LBS | HEART RATE: 85 BPM

## 2021-05-14 DIAGNOSIS — S81.802A OPEN WOUND OF LEFT LOWER LEG, INITIAL ENCOUNTER: Primary | ICD-10-CM

## 2021-05-14 PROCEDURE — 1036F TOBACCO NON-USER: CPT | Performed by: FAMILY MEDICINE

## 2021-05-14 PROCEDURE — 4040F PNEUMOC VAC/ADMIN/RCVD: CPT | Performed by: FAMILY MEDICINE

## 2021-05-14 PROCEDURE — 3017F COLORECTAL CA SCREEN DOC REV: CPT | Performed by: FAMILY MEDICINE

## 2021-05-14 PROCEDURE — 99214 OFFICE O/P EST MOD 30 MIN: CPT | Performed by: FAMILY MEDICINE

## 2021-05-14 PROCEDURE — G8400 PT W/DXA NO RESULTS DOC: HCPCS | Performed by: FAMILY MEDICINE

## 2021-05-14 PROCEDURE — G8427 DOCREV CUR MEDS BY ELIG CLIN: HCPCS | Performed by: FAMILY MEDICINE

## 2021-05-14 PROCEDURE — 1123F ACP DISCUSS/DSCN MKR DOCD: CPT | Performed by: FAMILY MEDICINE

## 2021-05-14 PROCEDURE — G8417 CALC BMI ABV UP PARAM F/U: HCPCS | Performed by: FAMILY MEDICINE

## 2021-05-14 PROCEDURE — 99212 OFFICE O/P EST SF 10 MIN: CPT | Performed by: FAMILY MEDICINE

## 2021-05-14 PROCEDURE — 1090F PRES/ABSN URINE INCON ASSESS: CPT | Performed by: FAMILY MEDICINE

## 2021-05-14 RX ORDER — TIZANIDINE HYDROCHLORIDE 4 MG/1
1 CAPSULE, GELATIN COATED ORAL 2 TIMES DAILY PRN
COMMUNITY
End: 2021-12-20

## 2021-05-14 RX ORDER — CIPROFLOXACIN 500 MG/1
500 TABLET, FILM COATED ORAL 2 TIMES DAILY
Qty: 14 TABLET | Refills: 0 | Status: SHIPPED | OUTPATIENT
Start: 2021-05-14 | End: 2021-05-21

## 2021-05-14 RX ORDER — LIDOCAINE 50 MG/G
PATCH TOPICAL
COMMUNITY
Start: 2021-05-03 | End: 2021-12-20

## 2021-05-14 RX ORDER — HYDROCODONE BITARTRATE AND ACETAMINOPHEN 5; 325 MG/1; MG/1
1 TABLET ORAL 3 TIMES DAILY PRN
COMMUNITY
End: 2021-07-12

## 2021-05-14 RX ORDER — CLINDAMYCIN HYDROCHLORIDE 300 MG/1
1 CAPSULE ORAL 3 TIMES DAILY
COMMUNITY
Start: 2021-05-05 | End: 2021-05-18

## 2021-05-14 ASSESSMENT — ENCOUNTER SYMPTOMS: COLOR CHANGE: 1

## 2021-05-14 NOTE — PROGRESS NOTES
L/E APX 1045 P/W/D, easy RR, NAD Noted. Pt +tara CDD to with telfa and ACE Wrap. Edu Pt on P/C/<3SCR with +comprehension, -questions upon D/C.
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
## 2021-05-17 DIAGNOSIS — M25.572 CHRONIC PAIN OF LEFT ANKLE: Primary | ICD-10-CM

## 2021-05-17 DIAGNOSIS — G89.29 CHRONIC PAIN OF LEFT ANKLE: Primary | ICD-10-CM

## 2021-05-17 NOTE — TELEPHONE ENCOUNTER
If I have an open slot I can see her tomorrow, otherwise the 20th is only 2 days later. If she has an acute problems she should be seen by the  today or go to the ED.

## 2021-05-18 ENCOUNTER — OFFICE VISIT (OUTPATIENT)
Dept: SURGERY | Age: 66
End: 2021-05-18
Payer: MEDICARE

## 2021-05-18 VITALS
DIASTOLIC BLOOD PRESSURE: 84 MMHG | TEMPERATURE: 97.2 F | BODY MASS INDEX: 40.03 KG/M2 | WEIGHT: 226 LBS | SYSTOLIC BLOOD PRESSURE: 140 MMHG | RESPIRATION RATE: 18 BRPM | HEART RATE: 100 BPM

## 2021-05-18 DIAGNOSIS — S87.82XD CRUSHING INJURY OF LEFT LOWER EXTREMITY, SUBSEQUENT ENCOUNTER: Primary | ICD-10-CM

## 2021-05-18 DIAGNOSIS — L03.116 CELLULITIS OF LEFT LEG: ICD-10-CM

## 2021-05-18 PROCEDURE — 3017F COLORECTAL CA SCREEN DOC REV: CPT | Performed by: SURGERY

## 2021-05-18 PROCEDURE — G8417 CALC BMI ABV UP PARAM F/U: HCPCS | Performed by: SURGERY

## 2021-05-18 PROCEDURE — 1090F PRES/ABSN URINE INCON ASSESS: CPT | Performed by: SURGERY

## 2021-05-18 PROCEDURE — 4040F PNEUMOC VAC/ADMIN/RCVD: CPT | Performed by: SURGERY

## 2021-05-18 PROCEDURE — G8400 PT W/DXA NO RESULTS DOC: HCPCS | Performed by: SURGERY

## 2021-05-18 PROCEDURE — 99213 OFFICE O/P EST LOW 20 MIN: CPT | Performed by: SURGERY

## 2021-05-18 PROCEDURE — 1036F TOBACCO NON-USER: CPT | Performed by: SURGERY

## 2021-05-18 PROCEDURE — G8427 DOCREV CUR MEDS BY ELIG CLIN: HCPCS | Performed by: SURGERY

## 2021-05-18 PROCEDURE — 1123F ACP DISCUSS/DSCN MKR DOCD: CPT | Performed by: SURGERY

## 2021-05-18 RX ORDER — MUPIROCIN CALCIUM 20 MG/G
CREAM TOPICAL
COMMUNITY
End: 2021-12-20

## 2021-05-18 RX ORDER — LIDOCAINE 5% 5 G/100G
CREAM TOPICAL
COMMUNITY
End: 2021-05-18

## 2021-05-18 RX ORDER — CIPROFLOXACIN 500 MG/1
1 TABLET, FILM COATED ORAL 2 TIMES DAILY
COMMUNITY
Start: 2021-05-14 | End: 2021-05-18

## 2021-05-18 RX ORDER — HYDROCODONE BITARTRATE AND ACETAMINOPHEN 5; 325 MG/1; MG/1
TABLET ORAL
COMMUNITY
End: 2021-07-12

## 2021-05-18 RX ORDER — SULFAMETHOXAZOLE AND TRIMETHOPRIM 800; 160 MG/1; MG/1
1 TABLET ORAL 2 TIMES DAILY
Qty: 20 TABLET | Refills: 0 | Status: SHIPPED | OUTPATIENT
Start: 2021-05-18 | End: 2021-05-28
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
## 2021-05-18 NOTE — PATIENT INSTRUCTIONS
Today a compression dressing has been applied to your lower leg. Its purpose is to reduce swelling and help treat your wound. It will stay on until your next appointment. 1. It must stay dry. You can shower with a bag covering it or purchase a shower boot at  a medical supply store. 2. If the dressing falls more than 2 inches or gets wet, call us ([phone]) to come in  to have it changed. 3. If the top or bottom rolls, you can snip through it to relieve the constriction. 4. To help reduce swelling, when sitting elevate your leg, preferably to heart level. Avoid standing in one place for long periods of time. 5.  A rare complication is a decrease of arterial blood flow to your toes. If your   leg becomes more painful with numbness or tingling or a purple color to your toes,  carefully remove the dressing by cutting it off or unwrapping it. If normal sensation does not return to the limb, seek medical help. Continue to elevate legs when in chair. SIGNS OF INFECTION  - Redness, swelling, skin hot  - Wound bed turns black or stringy yellow  - Foul odor  - Increased drainage or pus  - Increased pain  - Fever greater than 100F    CALL YOUR DOCTOR OR SEEK MEDICAL ATTENTION IF SIGNS OF INFECTION.   DO NOT WAIT UNTIL YOUR NEXT APPOINTMENT    Call the Wound Care Nurse with any other questions or concerns- [phone]  Follow up as scheduled

## 2021-05-20 ENCOUNTER — TELEMEDICINE (OUTPATIENT)
Dept: PAIN MANAGEMENT | Age: 66
End: 2021-05-20
Payer: MEDICARE

## 2021-05-20 DIAGNOSIS — M47.816 LUMBAR FACET JOINT SYNDROME: ICD-10-CM

## 2021-05-20 DIAGNOSIS — M79.605 PAIN OF LEFT LOWER EXTREMITY: Primary | ICD-10-CM

## 2021-05-20 DIAGNOSIS — M54.16 LUMBAR RADICULOPATHY: ICD-10-CM

## 2021-05-20 PROCEDURE — 3017F COLORECTAL CA SCREEN DOC REV: CPT | Performed by: NURSE PRACTITIONER

## 2021-05-20 PROCEDURE — G8427 DOCREV CUR MEDS BY ELIG CLIN: HCPCS | Performed by: NURSE PRACTITIONER

## 2021-05-20 PROCEDURE — 4040F PNEUMOC VAC/ADMIN/RCVD: CPT | Performed by: NURSE PRACTITIONER

## 2021-05-20 PROCEDURE — 99214 OFFICE O/P EST MOD 30 MIN: CPT | Performed by: NURSE PRACTITIONER

## 2021-05-20 PROCEDURE — 99211 OFF/OP EST MAY X REQ PHY/QHP: CPT | Performed by: NURSE PRACTITIONER

## 2021-05-20 PROCEDURE — G8400 PT W/DXA NO RESULTS DOC: HCPCS | Performed by: NURSE PRACTITIONER

## 2021-05-20 PROCEDURE — 1090F PRES/ABSN URINE INCON ASSESS: CPT | Performed by: NURSE PRACTITIONER

## 2021-05-20 PROCEDURE — 1123F ACP DISCUSS/DSCN MKR DOCD: CPT | Performed by: NURSE PRACTITIONER

## 2021-05-20 RX ORDER — ACETAMINOPHEN AND CODEINE PHOSPHATE 300; 30 MG/1; MG/1
1 TABLET ORAL EVERY 8 HOURS PRN
Qty: 42 TABLET | Refills: 1 | Status: SHIPPED | OUTPATIENT
Start: 2021-05-20 | End: 2021-06-03

## 2021-05-20 ASSESSMENT — ENCOUNTER SYMPTOMS
DIARRHEA: 1
BACK PAIN: 1
SHORTNESS OF BREATH: 0
SORE THROAT: 0
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
## 2021-05-21 ENCOUNTER — OFFICE VISIT (OUTPATIENT)
Dept: SURGERY | Age: 66
End: 2021-05-21
Payer: MEDICARE

## 2021-05-21 VITALS
SYSTOLIC BLOOD PRESSURE: 128 MMHG | HEART RATE: 87 BPM | HEIGHT: 63 IN | DIASTOLIC BLOOD PRESSURE: 59 MMHG | TEMPERATURE: 98.6 F | BODY MASS INDEX: 40.4 KG/M2 | WEIGHT: 228 LBS

## 2021-05-21 DIAGNOSIS — L03.116 CELLULITIS OF LEFT LEG: ICD-10-CM

## 2021-05-21 DIAGNOSIS — S87.82XD CRUSHING INJURY OF LEFT LOWER EXTREMITY, SUBSEQUENT ENCOUNTER: Primary | ICD-10-CM

## 2021-05-21 PROCEDURE — G8427 DOCREV CUR MEDS BY ELIG CLIN: HCPCS | Performed by: SURGERY

## 2021-05-21 PROCEDURE — G8400 PT W/DXA NO RESULTS DOC: HCPCS | Performed by: SURGERY

## 2021-05-21 PROCEDURE — 1123F ACP DISCUSS/DSCN MKR DOCD: CPT | Performed by: SURGERY

## 2021-05-21 PROCEDURE — 4040F PNEUMOC VAC/ADMIN/RCVD: CPT | Performed by: SURGERY

## 2021-05-21 PROCEDURE — 99212 OFFICE O/P EST SF 10 MIN: CPT | Performed by: SURGERY

## 2021-05-21 PROCEDURE — 1090F PRES/ABSN URINE INCON ASSESS: CPT | Performed by: SURGERY

## 2021-05-21 PROCEDURE — 1036F TOBACCO NON-USER: CPT | Performed by: SURGERY

## 2021-05-21 PROCEDURE — G8417 CALC BMI ABV UP PARAM F/U: HCPCS | Performed by: SURGERY

## 2021-05-21 PROCEDURE — 3017F COLORECTAL CA SCREEN DOC REV: CPT | Performed by: SURGERY

## 2021-05-21 NOTE — PROGRESS NOTES
Her for ongoing management of her left leg wound. It is feeling better. BP (!) 128/59   Pulse 87   Temp 98.6 °F (37 °C) (Temporal)   Ht 5' 3\" (1.6 m)   Wt 228 lb (103.4 kg)   BMI 40.39 kg/m²         Her leg is much less red and edematous. Looks l cirilo some of the skin is going to be full thickness injury. IMP/PLAN  1) Observe for now  2) Recheck in wound care Tuesday.   3) Continue compression and foam  4) Complete sufla/tmep

## 2021-05-25 ENCOUNTER — HOSPITAL ENCOUNTER (OUTPATIENT)
Dept: WOUND CARE | Age: 66
Discharge: HOME | End: 2021-05-26
Payer: MEDICARE

## 2021-05-25 VITALS
HEART RATE: 80 BPM | TEMPERATURE: 98 F | WEIGHT: 223 LBS | RESPIRATION RATE: 16 BRPM | DIASTOLIC BLOOD PRESSURE: 86 MMHG | SYSTOLIC BLOOD PRESSURE: 120 MMHG | HEIGHT: 63 IN | BODY MASS INDEX: 39.51 KG/M2 | OXYGEN SATURATION: 96 %

## 2021-05-25 DIAGNOSIS — S81.802D OPEN WOUND OF LEFT LOWER LEG, SUBSEQUENT ENCOUNTER: Primary | ICD-10-CM

## 2021-05-25 PROBLEM — S81.802A OPEN WOUND OF LEFT LOWER LEG: Status: ACTIVE | Noted: 2021-05-25

## 2021-05-25 PROCEDURE — 99214 OFFICE O/P EST MOD 30 MIN: CPT

## 2021-05-25 PROCEDURE — 11042 DBRDMT SUBQ TIS 1ST 20SQCM/<: CPT | Performed by: SURGERY

## 2021-05-25 RX ORDER — GINSENG 100 MG
CAPSULE ORAL ONCE
Status: CANCELLED | OUTPATIENT
Start: 2021-05-25 | End: 2021-05-25

## 2021-05-25 RX ORDER — CLOBETASOL PROPIONATE 0.5 MG/G
OINTMENT TOPICAL ONCE
Status: CANCELLED | OUTPATIENT
Start: 2021-05-25 | End: 2021-05-25

## 2021-05-25 RX ORDER — LIDOCAINE 50 MG/G
OINTMENT TOPICAL ONCE
Status: CANCELLED | OUTPATIENT
Start: 2021-05-25 | End: 2021-05-25

## 2021-05-25 RX ORDER — LIDOCAINE HYDROCHLORIDE 20 MG/ML
JELLY TOPICAL ONCE
Status: CANCELLED | OUTPATIENT
Start: 2021-05-25 | End: 2021-05-25

## 2021-05-25 RX ORDER — LIDOCAINE HYDROCHLORIDE 40 MG/ML
SOLUTION TOPICAL ONCE
Status: CANCELLED | OUTPATIENT
Start: 2021-05-25 | End: 2021-05-25

## 2021-05-25 RX ORDER — BACITRACIN, NEOMYCIN, POLYMYXIN B 400; 3.5; 5 [USP'U]/G; MG/G; [USP'U]/G
OINTMENT TOPICAL ONCE
Status: CANCELLED | OUTPATIENT
Start: 2021-05-25 | End: 2021-05-25

## 2021-05-25 RX ORDER — GENTAMICIN SULFATE 1 MG/G
OINTMENT TOPICAL ONCE
Status: CANCELLED | OUTPATIENT
Start: 2021-05-25 | End: 2021-05-25

## 2021-05-25 RX ORDER — BETAMETHASONE DIPROPIONATE 0.05 %
OINTMENT (GRAM) TOPICAL ONCE
Status: CANCELLED | OUTPATIENT
Start: 2021-05-25 | End: 2021-05-25

## 2021-05-25 RX ORDER — BACITRACIN ZINC AND POLYMYXIN B SULFATE 500; 1000 [USP'U]/G; [USP'U]/G
OINTMENT TOPICAL ONCE
Status: CANCELLED | OUTPATIENT
Start: 2021-05-25 | End: 2021-05-25

## 2021-05-25 RX ORDER — LIDOCAINE 40 MG/G
CREAM TOPICAL ONCE
Status: CANCELLED | OUTPATIENT
Start: 2021-05-25 | End: 2021-05-25

## 2021-05-25 ASSESSMENT — PAIN SCALES - GENERAL: PAINLEVEL_OUTOF10: 0

## 2021-05-25 NOTE — PLAN OF CARE
Problem: Wound:  Goal: Will show signs of wound healing; wound closure and no evidence of infection  Description: Will show signs of wound healing; wound closure and no evidence of infection  Outcome: Ongoing     Problem: Compression therapy:  Goal: Will be free from complications associated with compression therapy  Description: Will be free from complications associated with compression therapy  Outcome: Ongoing     Problem: Weight control:  Goal: Ability to maintain an optimal weight for height and age will be supported  Description: Ability to maintain an optimal weight for height and age will be supported  Outcome: Ongoing     Problem: Falls - Risk of:  Goal: Will remain free from falls  Description: Will remain free from falls  Outcome: Ongoing

## 2021-05-26 ENCOUNTER — NURSE ONLY (OUTPATIENT)
Dept: SURGERY | Age: 66
End: 2021-05-26
Payer: MEDICARE

## 2021-05-26 ENCOUNTER — TELEPHONE (OUTPATIENT)
Dept: WOUND CARE | Age: 66
End: 2021-05-26

## 2021-05-26 VITALS
HEART RATE: 61 BPM | DIASTOLIC BLOOD PRESSURE: 80 MMHG | RESPIRATION RATE: 16 BRPM | TEMPERATURE: 98.1 F | SYSTOLIC BLOOD PRESSURE: 120 MMHG | WEIGHT: 223 LBS | HEIGHT: 63 IN | BODY MASS INDEX: 39.51 KG/M2

## 2021-05-26 DIAGNOSIS — S87.82XD CRUSHING INJURY OF LEFT LOWER EXTREMITY, SUBSEQUENT ENCOUNTER: Primary | ICD-10-CM

## 2021-05-26 PROCEDURE — 99213 OFFICE O/P EST LOW 20 MIN: CPT | Performed by: SURGERY

## 2021-05-26 NOTE — TELEPHONE ENCOUNTER
Spoke with patient, states dressing is completely saturated from wound drainage. Scheduled nurse visit to change foam and TLC wrap today. Patient agrees to come in. Patient denies any pain to leg.

## 2021-05-26 NOTE — TELEPHONE ENCOUNTER
Patient had her wound on her left leg debrided in the office on 5/25/2021. She states it started to soak through the back side of the bandage but when she woke up this morning it is soaked through the front and back. She put an extra sock on and that is also starting to soak through. She doesn't think she can wait until her next appointment for the bandage to be changed. Please call Maureen Sandifer back at [phone].

## 2021-05-26 NOTE — PATIENT INSTRUCTIONS
SIGNS OF INFECTION  - Redness, swelling, skin hot  - Wound bed turns black or stringy yellow  - Foul odor  - Increased drainage or pus  - Increased pain  - Fever greater than 100F    CALL YOUR DOCTOR OR SEEK MEDICAL ATTENTION IF SIGNS OF INFECTION.   DO NOT WAIT UNTIL YOUR NEXT APPOINTMENT    Call the Wound Care Nurse with any other questions or concerns- [phone]

## 2021-06-01 ENCOUNTER — OFFICE VISIT (OUTPATIENT)
Dept: SURGERY | Age: 66
End: 2021-06-01
Payer: MEDICARE

## 2021-06-01 VITALS
HEART RATE: 76 BPM | BODY MASS INDEX: 40.21 KG/M2 | SYSTOLIC BLOOD PRESSURE: 124 MMHG | DIASTOLIC BLOOD PRESSURE: 60 MMHG | RESPIRATION RATE: 18 BRPM | TEMPERATURE: 97 F | WEIGHT: 227 LBS

## 2021-06-01 DIAGNOSIS — L97.922 LEG ULCER, LEFT, WITH FAT LAYER EXPOSED (HCC): ICD-10-CM

## 2021-06-01 DIAGNOSIS — S87.82XD CRUSHING INJURY OF LEFT LOWER EXTREMITY, SUBSEQUENT ENCOUNTER: Primary | ICD-10-CM

## 2021-06-01 PROBLEM — S87.82XA CRUSHING INJURY OF LEFT LEG: Status: ACTIVE | Noted: 2021-06-01

## 2021-06-01 PROCEDURE — 11042 DBRDMT SUBQ TIS 1ST 20SQCM/<: CPT | Performed by: SURGERY

## 2021-06-01 PROCEDURE — 11045 DBRDMT SUBQ TISS EACH ADDL: CPT | Performed by: SURGERY

## 2021-06-01 PROCEDURE — 99213 OFFICE O/P EST LOW 20 MIN: CPT | Performed by: SURGERY
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
## 2021-06-01 NOTE — PATIENT INSTRUCTIONS
Super absorbent and two-layer compression wrap applied today. Keep dressing clean dry and intact till next appointment. SIGNS OF INFECTION  - Redness, swelling, skin hot  - Wound bed turns black or stringy yellow  - Foul odor  - Increased drainage or pus  - Increased pain  - Fever greater than 100F    CALL YOUR DOCTOR OR SEEK MEDICAL ATTENTION IF SIGNS OF INFECTION.   DO NOT WAIT UNTIL YOUR NEXT APPOINTMENT    Call the Wound Care Nurse with any other questions or concerns- [phone]  Follow up as scheduled

## 2021-06-04 ENCOUNTER — OFFICE VISIT (OUTPATIENT)
Dept: ORTHOPEDIC SURGERY | Age: 66
End: 2021-06-04
Payer: MEDICARE

## 2021-06-04 VITALS
DIASTOLIC BLOOD PRESSURE: 82 MMHG | HEART RATE: 76 BPM | HEIGHT: 63 IN | WEIGHT: 227 LBS | BODY MASS INDEX: 40.22 KG/M2 | SYSTOLIC BLOOD PRESSURE: 136 MMHG

## 2021-06-04 DIAGNOSIS — M70.62 TROCHANTERIC BURSITIS OF BOTH HIPS: Primary | ICD-10-CM

## 2021-06-04 DIAGNOSIS — M54.16 LUMBAR RADICULOPATHY: ICD-10-CM

## 2021-06-04 DIAGNOSIS — M70.61 TROCHANTERIC BURSITIS OF BOTH HIPS: Primary | ICD-10-CM

## 2021-06-04 PROCEDURE — 1123F ACP DISCUSS/DSCN MKR DOCD: CPT | Performed by: PHYSICIAN ASSISTANT

## 2021-06-04 PROCEDURE — 99214 OFFICE O/P EST MOD 30 MIN: CPT | Performed by: PHYSICIAN ASSISTANT

## 2021-06-04 PROCEDURE — G8427 DOCREV CUR MEDS BY ELIG CLIN: HCPCS | Performed by: PHYSICIAN ASSISTANT

## 2021-06-04 PROCEDURE — 1036F TOBACCO NON-USER: CPT | Performed by: PHYSICIAN ASSISTANT

## 2021-06-04 PROCEDURE — G8417 CALC BMI ABV UP PARAM F/U: HCPCS | Performed by: PHYSICIAN ASSISTANT

## 2021-06-04 PROCEDURE — 20610 DRAIN/INJ JOINT/BURSA W/O US: CPT | Performed by: PHYSICIAN ASSISTANT

## 2021-06-04 PROCEDURE — 1090F PRES/ABSN URINE INCON ASSESS: CPT | Performed by: PHYSICIAN ASSISTANT

## 2021-06-04 PROCEDURE — G8400 PT W/DXA NO RESULTS DOC: HCPCS | Performed by: PHYSICIAN ASSISTANT

## 2021-06-04 PROCEDURE — 4040F PNEUMOC VAC/ADMIN/RCVD: CPT | Performed by: PHYSICIAN ASSISTANT

## 2021-06-04 PROCEDURE — 3017F COLORECTAL CA SCREEN DOC REV: CPT | Performed by: PHYSICIAN ASSISTANT

## 2021-06-04 RX ORDER — BUPIVACAINE HYDROCHLORIDE 5 MG/ML
2 INJECTION, SOLUTION PERINEURAL ONCE
Status: COMPLETED | OUTPATIENT
Start: 2021-06-04 | End: 2021-06-04

## 2021-06-04 RX ORDER — METHYLPREDNISOLONE ACETATE 40 MG/ML
40 INJECTION, SUSPENSION INTRA-ARTICULAR; INTRALESIONAL; INTRAMUSCULAR; SOFT TISSUE ONCE
Status: COMPLETED | OUTPATIENT
Start: 2021-06-04 | End: 2021-06-04

## 2021-06-04 RX ORDER — LIDOCAINE HYDROCHLORIDE 10 MG/ML
2 INJECTION, SOLUTION INFILTRATION; PERINEURAL ONCE
Status: COMPLETED | OUTPATIENT
Start: 2021-06-04 | End: 2021-06-04

## 2021-06-04 RX ADMIN — BUPIVACAINE HYDROCHLORIDE 10 MG: 5 INJECTION, SOLUTION PERINEURAL at 16:36

## 2021-06-04 RX ADMIN — METHYLPREDNISOLONE ACETATE 40 MG: 40 INJECTION, SUSPENSION INTRA-ARTICULAR; INTRALESIONAL; INTRAMUSCULAR; SOFT TISSUE at 16:38

## 2021-06-04 RX ADMIN — LIDOCAINE HYDROCHLORIDE 2 ML: 10 INJECTION, SOLUTION INFILTRATION; PERINEURAL at 16:37

## 2021-06-04 RX ADMIN — METHYLPREDNISOLONE ACETATE 40 MG: 40 INJECTION, SUSPENSION INTRA-ARTICULAR; INTRALESIONAL; INTRAMUSCULAR; SOFT TISSUE at 16:39

## 2021-06-08 ENCOUNTER — HOSPITAL ENCOUNTER (OUTPATIENT)
Dept: LAB | Age: 66
Discharge: HOME | End: 2021-06-08
Payer: MEDICARE

## 2021-06-08 ENCOUNTER — HOSPITAL ENCOUNTER (OUTPATIENT)
Dept: WOUND CARE | Age: 66
Discharge: HOME | End: 2021-06-09
Payer: MEDICARE

## 2021-06-08 VITALS
RESPIRATION RATE: 18 BRPM | WEIGHT: 226 LBS | HEART RATE: 80 BPM | BODY MASS INDEX: 40.04 KG/M2 | TEMPERATURE: 98.3 F | HEIGHT: 63 IN | DIASTOLIC BLOOD PRESSURE: 80 MMHG | SYSTOLIC BLOOD PRESSURE: 128 MMHG

## 2021-06-08 DIAGNOSIS — S81.802D OPEN WOUND OF LEFT LOWER LEG, SUBSEQUENT ENCOUNTER: ICD-10-CM

## 2021-06-08 DIAGNOSIS — L97.922 LEG ULCER, LEFT, WITH FAT LAYER EXPOSED (HCC): ICD-10-CM

## 2021-06-08 DIAGNOSIS — L97.922 LEG ULCER, LEFT, WITH FAT LAYER EXPOSED (HCC): Primary | ICD-10-CM

## 2021-06-08 LAB
ABSOLUTE EOS #: 0.16 K/UL (ref 0–0.44)
ABSOLUTE IMMATURE GRANULOCYTE: <0.03 K/UL (ref 0–0.3)
ABSOLUTE LYMPH #: 2.18 K/UL (ref 1.1–3.7)
ABSOLUTE MONO #: 0.59 K/UL (ref 0.1–1.2)
ANION GAP SERPL CALCULATED.3IONS-SCNC: 7 MMOL/L (ref 9–17)
BASOPHILS # BLD: 1 % (ref 0–2)
BASOPHILS ABSOLUTE: 0.05 K/UL (ref 0–0.2)
BUN BLDV-MCNC: 11 MG/DL (ref 8–23)
BUN/CREAT BLD: 21 (ref 9–20)
CALCIUM SERPL-MCNC: 9.5 MG/DL (ref 8.6–10.4)
CHLORIDE BLD-SCNC: 101 MMOL/L (ref 98–107)
CO2: 29 MMOL/L (ref 20–31)
CREAT SERPL-MCNC: 0.52 MG/DL (ref 0.5–0.9)
DIFFERENTIAL TYPE: NORMAL
EOSINOPHILS RELATIVE PERCENT: 2 % (ref 1–4)
GFR AFRICAN AMERICAN: >60 ML/MIN
GFR NON-AFRICAN AMERICAN: >60 ML/MIN
GFR SERPL CREATININE-BSD FRML MDRD: ABNORMAL ML/MIN/{1.73_M2}
GFR SERPL CREATININE-BSD FRML MDRD: ABNORMAL ML/MIN/{1.73_M2}
GLUCOSE BLD-MCNC: 103 MG/DL (ref 70–99)
HCT VFR BLD CALC: 43 % (ref 36.3–47.1)
HEMOGLOBIN: 13.8 G/DL (ref 11.9–15.1)
IMMATURE GRANULOCYTES: 0 %
LYMPHOCYTES # BLD: 30 % (ref 24–43)
MCH RBC QN AUTO: 32.1 PG (ref 25.2–33.5)
MCHC RBC AUTO-ENTMCNC: 32.1 G/DL (ref 25.2–33.5)
MCV RBC AUTO: 100 FL (ref 82.6–102.9)
MONOCYTES # BLD: 8 % (ref 3–12)
NRBC AUTOMATED: 0 PER 100 WBC
PDW BLD-RTO: 13.3 % (ref 11.8–14.4)
PLATELET # BLD: 299 K/UL (ref 138–453)
PLATELET ESTIMATE: NORMAL
PMV BLD AUTO: 8.8 FL (ref 8.1–13.5)
POTASSIUM SERPL-SCNC: 4.4 MMOL/L (ref 3.7–5.3)
RBC # BLD: 4.3 M/UL (ref 3.95–5.11)
RBC # BLD: NORMAL 10*6/UL
SEG NEUTROPHILS: 59 % (ref 36–65)
SEGMENTED NEUTROPHILS ABSOLUTE COUNT: 4.24 K/UL (ref 1.5–8.1)
SODIUM BLD-SCNC: 137 MMOL/L (ref 135–144)
WBC # BLD: 7.2 K/UL (ref 3.5–11.3)
WBC # BLD: NORMAL 10*3/UL

## 2021-06-08 PROCEDURE — 80048 BASIC METABOLIC PNL TOTAL CA: CPT

## 2021-06-08 PROCEDURE — 87075 CULTR BACTERIA EXCEPT BLOOD: CPT

## 2021-06-08 PROCEDURE — 87070 CULTURE OTHR SPECIMN AEROBIC: CPT

## 2021-06-08 PROCEDURE — 87176 TISSUE HOMOGENIZATION CULTR: CPT

## 2021-06-08 PROCEDURE — 85025 COMPLETE CBC W/AUTO DIFF WBC: CPT

## 2021-06-08 PROCEDURE — 11042 DBRDMT SUBQ TIS 1ST 20SQCM/<: CPT | Performed by: SURGERY

## 2021-06-08 PROCEDURE — 87205 SMEAR GRAM STAIN: CPT

## 2021-06-08 PROCEDURE — 99213 OFFICE O/P EST LOW 20 MIN: CPT

## 2021-06-08 PROCEDURE — 36415 COLL VENOUS BLD VENIPUNCTURE: CPT

## 2021-06-08 PROCEDURE — 11045 DBRDMT SUBQ TISS EACH ADDL: CPT | Performed by: SURGERY

## 2021-06-08 RX ORDER — LIDOCAINE 40 MG/G
CREAM TOPICAL ONCE
Status: CANCELLED | OUTPATIENT
Start: 2021-06-08 | End: 2021-06-08

## 2021-06-08 RX ORDER — LIDOCAINE HYDROCHLORIDE 40 MG/ML
SOLUTION TOPICAL ONCE
Status: CANCELLED | OUTPATIENT
Start: 2021-06-08 | End: 2021-06-08

## 2021-06-08 RX ORDER — LIDOCAINE HYDROCHLORIDE 20 MG/ML
JELLY TOPICAL ONCE
Status: CANCELLED | OUTPATIENT
Start: 2021-06-08 | End: 2021-06-08

## 2021-06-08 RX ORDER — CLOBETASOL PROPIONATE 0.5 MG/G
OINTMENT TOPICAL ONCE
Status: CANCELLED | OUTPATIENT
Start: 2021-06-08 | End: 2021-06-08

## 2021-06-08 RX ORDER — GINSENG 100 MG
CAPSULE ORAL ONCE
Status: CANCELLED | OUTPATIENT
Start: 2021-06-08 | End: 2021-06-08

## 2021-06-08 RX ORDER — BACITRACIN ZINC AND POLYMYXIN B SULFATE 500; 1000 [USP'U]/G; [USP'U]/G
OINTMENT TOPICAL ONCE
Status: CANCELLED | OUTPATIENT
Start: 2021-06-08 | End: 2021-06-08

## 2021-06-08 RX ORDER — BETAMETHASONE DIPROPIONATE 0.05 %
OINTMENT (GRAM) TOPICAL ONCE
Status: CANCELLED | OUTPATIENT
Start: 2021-06-08 | End: 2021-06-08

## 2021-06-08 RX ORDER — GENTAMICIN SULFATE 1 MG/G
OINTMENT TOPICAL ONCE
Status: CANCELLED | OUTPATIENT
Start: 2021-06-08 | End: 2021-06-08

## 2021-06-08 RX ORDER — BACITRACIN, NEOMYCIN, POLYMYXIN B 400; 3.5; 5 [USP'U]/G; MG/G; [USP'U]/G
OINTMENT TOPICAL ONCE
Status: CANCELLED | OUTPATIENT
Start: 2021-06-08 | End: 2021-06-08

## 2021-06-08 RX ORDER — LIDOCAINE 50 MG/G
OINTMENT TOPICAL ONCE
Status: CANCELLED | OUTPATIENT
Start: 2021-06-08 | End: 2021-06-08

## 2021-06-08 NOTE — PLAN OF CARE
Problem: Wound:  Goal: Will show signs of wound healing; wound closure and no evidence of infection  Description: Will show signs of wound healing; wound closure and no evidence of infection  Outcome: Ongoing     Problem: Smoking cessation:  Goal: Ability to formulate a plan to maintain a tobacco-free life will be supported  Description: Ability to formulate a plan to maintain a tobacco-free life will be supported  Outcome: Ongoing     Problem: Compression therapy:  Goal: Will be free from complications associated with compression therapy  Description: Will be free from complications associated with compression therapy  Outcome: Ongoing     Problem: Weight control:  Goal: Ability to maintain an optimal weight for height and age will be supported  Description: Ability to maintain an optimal weight for height and age will be supported  Outcome: Ongoing     Problem: Falls - Risk of:  Goal: Will remain free from falls  Description: Will remain free from falls  Outcome: Ongoing

## 2021-06-09 ENCOUNTER — TELEPHONE (OUTPATIENT)
Dept: SURGERY | Age: 66
End: 2021-06-09

## 2021-06-11 ENCOUNTER — TELEPHONE (OUTPATIENT)
Dept: WOUND CARE | Age: 66
End: 2021-06-11

## 2021-06-11 NOTE — TELEPHONE ENCOUNTER
Called patient and reviewed lab results. Tissue culture still in preliminary phase, but after 2 days shows no growth.  Told patient if anything changes will let her know, patient verbalized understanding

## 2021-06-13 LAB
CULTURE: NORMAL
DIRECT EXAM: NORMAL
DIRECT EXAM: NORMAL
Lab: NORMAL
SPECIMEN DESCRIPTION: NORMAL

## 2021-06-15 ENCOUNTER — OFFICE VISIT (OUTPATIENT)
Dept: SURGERY | Age: 66
End: 2021-06-15
Payer: MEDICARE

## 2021-06-15 VITALS
BODY MASS INDEX: 39.5 KG/M2 | DIASTOLIC BLOOD PRESSURE: 72 MMHG | TEMPERATURE: 96.7 F | SYSTOLIC BLOOD PRESSURE: 132 MMHG | HEART RATE: 88 BPM | RESPIRATION RATE: 18 BRPM | WEIGHT: 223 LBS

## 2021-06-15 DIAGNOSIS — L97.922 LEG ULCER, LEFT, WITH FAT LAYER EXPOSED (HCC): Primary | ICD-10-CM

## 2021-06-15 PROCEDURE — 11045 DBRDMT SUBQ TISS EACH ADDL: CPT | Performed by: SURGERY

## 2021-06-15 PROCEDURE — 99213 OFFICE O/P EST LOW 20 MIN: CPT | Performed by: SURGERY

## 2021-06-15 PROCEDURE — 11042 DBRDMT SUBQ TIS 1ST 20SQCM/<: CPT | Performed by: SURGERY
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
## 2021-06-15 NOTE — PATIENT INSTRUCTIONS
Silver collagen to left leg wound bed, two layer compression wrap applied to left leg. Keep dressing clean, dry and intact till next appt. If dressing gets wet or falls down-call clinic to get rewrapped. SIGNS OF INFECTION  - Redness, swelling, skin hot  - Wound bed turns black or stringy yellow  - Foul odor  - Increased drainage or pus  - Increased pain  - Fever greater than 100F    CALL YOUR DOCTOR OR SEEK MEDICAL ATTENTION IF SIGNS OF INFECTION. DO NOT WAIT UNTIL YOUR NEXT APPOINTMENT    Call the Wound Care Nurse with any other questions or concerns- [phone]   Follow up as scheduled    Checkin at radiology for wound care appt.

## 2021-06-22 ENCOUNTER — HOSPITAL ENCOUNTER (OUTPATIENT)
Dept: WOUND CARE | Age: 66
Discharge: HOME | End: 2021-06-23
Payer: MEDICARE

## 2021-06-22 VITALS
RESPIRATION RATE: 18 BRPM | WEIGHT: 223 LBS | HEIGHT: 63 IN | HEART RATE: 86 BPM | BODY MASS INDEX: 39.51 KG/M2 | DIASTOLIC BLOOD PRESSURE: 78 MMHG | SYSTOLIC BLOOD PRESSURE: 138 MMHG | TEMPERATURE: 97.2 F

## 2021-06-22 DIAGNOSIS — S81.802D OPEN WOUND OF LEFT LOWER LEG, SUBSEQUENT ENCOUNTER: Primary | ICD-10-CM

## 2021-06-22 PROCEDURE — 11045 DBRDMT SUBQ TISS EACH ADDL: CPT | Performed by: SURGERY

## 2021-06-22 PROCEDURE — 11046 DBRDMT MUSC&/FSCA EA ADDL: CPT | Performed by: SURGERY

## 2021-06-22 PROCEDURE — 29581 APPL MULTLAYER CMPRN SYS LEG: CPT

## 2021-06-22 PROCEDURE — 11042 DBRDMT SUBQ TIS 1ST 20SQCM/<: CPT | Performed by: SURGERY

## 2021-06-22 PROCEDURE — 11043 DBRDMT MUSC&/FSCA 1ST 20/<: CPT | Performed by: SURGERY

## 2021-06-22 PROCEDURE — 99213 OFFICE O/P EST LOW 20 MIN: CPT

## 2021-06-22 RX ORDER — LIDOCAINE 50 MG/G
OINTMENT TOPICAL ONCE
Status: CANCELLED | OUTPATIENT
Start: 2021-06-22 | End: 2021-06-22

## 2021-06-22 RX ORDER — BACITRACIN, NEOMYCIN, POLYMYXIN B 400; 3.5; 5 [USP'U]/G; MG/G; [USP'U]/G
OINTMENT TOPICAL ONCE
Status: CANCELLED | OUTPATIENT
Start: 2021-06-22 | End: 2021-06-22

## 2021-06-22 RX ORDER — BACITRACIN ZINC AND POLYMYXIN B SULFATE 500; 1000 [USP'U]/G; [USP'U]/G
OINTMENT TOPICAL ONCE
Status: CANCELLED | OUTPATIENT
Start: 2021-06-22 | End: 2021-06-22

## 2021-06-22 RX ORDER — LIDOCAINE 40 MG/G
CREAM TOPICAL ONCE
Status: CANCELLED | OUTPATIENT
Start: 2021-06-22 | End: 2021-06-22

## 2021-06-22 RX ORDER — GENTAMICIN SULFATE 1 MG/G
OINTMENT TOPICAL ONCE
Status: CANCELLED | OUTPATIENT
Start: 2021-06-22 | End: 2021-06-22

## 2021-06-22 RX ORDER — LIDOCAINE HYDROCHLORIDE 20 MG/ML
JELLY TOPICAL ONCE
Status: CANCELLED | OUTPATIENT
Start: 2021-06-22 | End: 2021-06-22

## 2021-06-22 RX ORDER — CLOBETASOL PROPIONATE 0.5 MG/G
OINTMENT TOPICAL ONCE
Status: CANCELLED | OUTPATIENT
Start: 2021-06-22 | End: 2021-06-22

## 2021-06-22 RX ORDER — GINSENG 100 MG
CAPSULE ORAL ONCE
Status: CANCELLED | OUTPATIENT
Start: 2021-06-22 | End: 2021-06-22

## 2021-06-22 RX ORDER — BETAMETHASONE DIPROPIONATE 0.05 %
OINTMENT (GRAM) TOPICAL ONCE
Status: CANCELLED | OUTPATIENT
Start: 2021-06-22 | End: 2021-06-22

## 2021-06-22 RX ORDER — LIDOCAINE HYDROCHLORIDE 40 MG/ML
SOLUTION TOPICAL ONCE
Status: CANCELLED | OUTPATIENT
Start: 2021-06-22 | End: 2021-06-22

## 2021-06-30 ENCOUNTER — OFFICE VISIT (OUTPATIENT)
Dept: SURGERY | Age: 66
End: 2021-06-30
Payer: MEDICARE

## 2021-06-30 VITALS
RESPIRATION RATE: 18 BRPM | BODY MASS INDEX: 39.5 KG/M2 | TEMPERATURE: 97.3 F | SYSTOLIC BLOOD PRESSURE: 132 MMHG | WEIGHT: 223 LBS | DIASTOLIC BLOOD PRESSURE: 80 MMHG | HEART RATE: 80 BPM

## 2021-06-30 DIAGNOSIS — S87.82XD CRUSHING INJURY OF LEFT LOWER EXTREMITY, SUBSEQUENT ENCOUNTER: ICD-10-CM

## 2021-06-30 DIAGNOSIS — L97.922 LEG ULCER, LEFT, WITH FAT LAYER EXPOSED (HCC): Primary | ICD-10-CM

## 2021-06-30 PROCEDURE — 11042 DBRDMT SUBQ TIS 1ST 20SQCM/<: CPT | Performed by: SURGERY

## 2021-06-30 PROCEDURE — 99213 OFFICE O/P EST LOW 20 MIN: CPT | Performed by: SURGERY

## 2021-06-30 PROCEDURE — 15271 SKIN SUB GRAFT TRNK/ARM/LEG: CPT | Performed by: SURGERY
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
## 2021-06-30 NOTE — PATIENT INSTRUCTIONS
Today a compression dressing has been applied to your lower leg. Its purpose is to reduce swelling and help treat your wound. It will stay on until your next appointment. 1. It must stay dry. You can shower with a bag covering it or purchase a shower boot at  a medical supply store. 2. If the dressing falls more than 2 inches or gets wet, call us ([phone]) to come in  to have it changed. 3. If the top or bottom rolls, you can snip through it to relieve the constriction. 4. To help reduce swelling, when sitting elevate your leg, preferably to heart level. Avoid standing in one place for long periods of time. 5.  A rare complication is a decrease of arterial blood flow to your toes. If your   leg becomes more painful with numbness or tingling or a purple color to your toes,  carefully remove the dressing by cutting it off or unwrapping it. If normal sensation does not return to the limb, seek medical help.      Follow up as scheduled

## 2021-06-30 NOTE — PROGRESS NOTES
NuShield Treatment Note    NAME:  Yuriy Kaur  YOB: 1955  MEDICAL RECORD NUMBER:  [id number]  DATE:  6/30/2021    Goal:  Patient will receive safe and proper application of skin substitute. Patient will comply with caring for dressing, offloading and reporting complications. Expiration date checked immediately prior to use. Package intact prior to use and no damage noted. Nushield was removed from protective sterile packaging by provider and applied to prepared ulcer bed. NuShield applied with notch to Top Upper Right Corner. NuShield was hydrated with sterile normal saline per provider. NuShield was applied to left leg and affixed with steri-strips by the provider. Coban or ace wrap was applied to secure graft and decrease edema. Patient/caregiver was instructed not to remove dressing and to keep it clean and dry. NuShield may be applied a total of 10 times per wound over a 12 week period. Additionally NuShield may only be used every 12 months per wound. Date of first application of NuShield for this current wound is June 30, 2021.       Application # 1 out of 10   3x4 cm applied to left leg            Guidelines followed    Electronically signed by Mackenzie Dent RN on 6/30/2021 at 4:16 PM

## 2021-07-06 ENCOUNTER — HOSPITAL ENCOUNTER (OUTPATIENT)
Dept: WOUND CARE | Age: 66
Discharge: HOME | End: 2021-07-07
Payer: MEDICARE

## 2021-07-06 VITALS
WEIGHT: 223 LBS | HEIGHT: 63 IN | SYSTOLIC BLOOD PRESSURE: 118 MMHG | RESPIRATION RATE: 18 BRPM | BODY MASS INDEX: 39.51 KG/M2 | HEART RATE: 70 BPM | DIASTOLIC BLOOD PRESSURE: 72 MMHG | TEMPERATURE: 98.1 F

## 2021-07-06 DIAGNOSIS — S81.802D OPEN WOUND OF LEFT LOWER LEG, SUBSEQUENT ENCOUNTER: Primary | ICD-10-CM

## 2021-07-06 PROCEDURE — 15271 SKIN SUB GRAFT TRNK/ARM/LEG: CPT | Performed by: SURGERY

## 2021-07-06 PROCEDURE — 99214 OFFICE O/P EST MOD 30 MIN: CPT

## 2021-07-06 PROCEDURE — 11042 DBRDMT SUBQ TIS 1ST 20SQCM/<: CPT | Performed by: SURGERY

## 2021-07-06 RX ORDER — LIDOCAINE HYDROCHLORIDE 20 MG/ML
JELLY TOPICAL ONCE
Status: CANCELLED | OUTPATIENT
Start: 2021-07-06 | End: 2021-07-06

## 2021-07-06 RX ORDER — LIDOCAINE 40 MG/G
CREAM TOPICAL ONCE
Status: CANCELLED | OUTPATIENT
Start: 2021-07-06 | End: 2021-07-06

## 2021-07-06 RX ORDER — BACITRACIN, NEOMYCIN, POLYMYXIN B 400; 3.5; 5 [USP'U]/G; MG/G; [USP'U]/G
OINTMENT TOPICAL ONCE
Status: CANCELLED | OUTPATIENT
Start: 2021-07-06 | End: 2021-07-06

## 2021-07-06 RX ORDER — LIDOCAINE 50 MG/G
OINTMENT TOPICAL ONCE
Status: CANCELLED | OUTPATIENT
Start: 2021-07-06 | End: 2021-07-06

## 2021-07-06 RX ORDER — GINSENG 100 MG
CAPSULE ORAL ONCE
Status: CANCELLED | OUTPATIENT
Start: 2021-07-06 | End: 2021-07-06

## 2021-07-06 RX ORDER — LIDOCAINE HYDROCHLORIDE 40 MG/ML
SOLUTION TOPICAL ONCE
Status: CANCELLED | OUTPATIENT
Start: 2021-07-06 | End: 2021-07-06

## 2021-07-06 RX ORDER — GENTAMICIN SULFATE 1 MG/G
OINTMENT TOPICAL ONCE
Status: CANCELLED | OUTPATIENT
Start: 2021-07-06 | End: 2021-07-06

## 2021-07-06 RX ORDER — BETAMETHASONE DIPROPIONATE 0.05 %
OINTMENT (GRAM) TOPICAL ONCE
Status: CANCELLED | OUTPATIENT
Start: 2021-07-06 | End: 2021-07-06

## 2021-07-06 RX ORDER — BACITRACIN ZINC AND POLYMYXIN B SULFATE 500; 1000 [USP'U]/G; [USP'U]/G
OINTMENT TOPICAL ONCE
Status: CANCELLED | OUTPATIENT
Start: 2021-07-06 | End: 2021-07-06

## 2021-07-06 RX ORDER — CLOBETASOL PROPIONATE 0.5 MG/G
OINTMENT TOPICAL ONCE
Status: CANCELLED | OUTPATIENT
Start: 2021-07-06 | End: 2021-07-06

## 2021-07-06 ASSESSMENT — PAIN SCALES - GENERAL: PAINLEVEL_OUTOF10: 0
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
## 2021-07-06 NOTE — PROGRESS NOTES
NuShield Treatment Note    NAME:  Arias Kaur  YOB: 1955  MEDICAL RECORD NUMBER:  [id number]  DATE:  7/6/2021    Goal:  Patient will receive safe and proper application of skin substitute. Patient will comply with caring for dressing, offloading and reporting complications. Expiration date checked immediately prior to use. Package intact prior to use and no damage noted. Nushield was removed from protective sterile packaging by provider and applied to prepared ulcer bed. NuShield applied with notch to Top Upper Right Corner. NuShield was hydrated with sterile normal saline per provider. NuShield was applied to left leg wound and affixed with steri-strips by the provider. Applied nonadherent and TLC wrap (Secondary Dressing)   Coban or ace wrap was applied to secure graft and decrease edema. Patient/caregiver was instructed not to remove dressing and to keep it clean and dry. NuShield may be applied a total of 10 times per wound over a 12 week period. Additionally NuShield may only be used every 12 months per wound. Date of first application of NuShield for this current wound is June 30, 2021.       Application # 2 out of 10   3x4 cm applied to left leg            Guidelines followed    Electronically signed by Nilson Elizondo RN on 7/6/2021 at 3:20 PM

## 2021-07-12 ENCOUNTER — OFFICE VISIT (OUTPATIENT)
Dept: PAIN MANAGEMENT | Age: 66
End: 2021-07-12
Payer: MEDICARE

## 2021-07-12 VITALS
WEIGHT: 230 LBS | BODY MASS INDEX: 40.75 KG/M2 | HEIGHT: 63 IN | DIASTOLIC BLOOD PRESSURE: 84 MMHG | SYSTOLIC BLOOD PRESSURE: 124 MMHG

## 2021-07-12 DIAGNOSIS — M76.31 ILIOTIBIAL BAND SYNDROME OF BOTH SIDES: ICD-10-CM

## 2021-07-12 DIAGNOSIS — M47.816 LUMBAR FACET JOINT SYNDROME: Primary | ICD-10-CM

## 2021-07-12 DIAGNOSIS — M76.32 ILIOTIBIAL BAND SYNDROME OF BOTH SIDES: ICD-10-CM

## 2021-07-12 DIAGNOSIS — M54.16 LUMBAR RADICULOPATHY: ICD-10-CM

## 2021-07-12 DIAGNOSIS — M51.36 DEGENERATION OF LUMBAR INTERVERTEBRAL DISC: ICD-10-CM

## 2021-07-12 PROCEDURE — G8400 PT W/DXA NO RESULTS DOC: HCPCS | Performed by: NURSE PRACTITIONER

## 2021-07-12 PROCEDURE — 1090F PRES/ABSN URINE INCON ASSESS: CPT | Performed by: NURSE PRACTITIONER

## 2021-07-12 PROCEDURE — G8417 CALC BMI ABV UP PARAM F/U: HCPCS | Performed by: NURSE PRACTITIONER

## 2021-07-12 PROCEDURE — G8427 DOCREV CUR MEDS BY ELIG CLIN: HCPCS | Performed by: NURSE PRACTITIONER

## 2021-07-12 PROCEDURE — 4040F PNEUMOC VAC/ADMIN/RCVD: CPT | Performed by: NURSE PRACTITIONER

## 2021-07-12 PROCEDURE — 1036F TOBACCO NON-USER: CPT | Performed by: NURSE PRACTITIONER

## 2021-07-12 PROCEDURE — 99214 OFFICE O/P EST MOD 30 MIN: CPT | Performed by: NURSE PRACTITIONER

## 2021-07-12 PROCEDURE — 3017F COLORECTAL CA SCREEN DOC REV: CPT | Performed by: NURSE PRACTITIONER

## 2021-07-12 PROCEDURE — 1123F ACP DISCUSS/DSCN MKR DOCD: CPT | Performed by: NURSE PRACTITIONER

## 2021-07-12 RX ORDER — ORPHENADRINE CITRATE 100 MG/1
100 TABLET, EXTENDED RELEASE ORAL 2 TIMES DAILY PRN
Qty: 30 TABLET | Refills: 5 | Status: SHIPPED | OUTPATIENT
Start: 2021-07-12 | End: 2021-08-11

## 2021-07-12 ASSESSMENT — ENCOUNTER SYMPTOMS
SHORTNESS OF BREATH: 0
BACK PAIN: 1
DIARRHEA: 1
SORE THROAT: 0

## 2021-07-12 NOTE — PROGRESS NOTES
Subjective:      Patient ID: Barbie Mahmood is a 73 y.o. female. Chief Complaint   Patient presents with    1 Month Follow-Up     lumbar radiculopathy     Back Pain  Pertinent negatives include no chest pain, fever, numbness or weakness. Here today for routine pain clinic recheck. Tizanidine at night making her dizzy but works very well. Improves pain, mood and sleep    Pain Assessment  Location of Pain: Back  Location Modifiers: Left, Right, Inferior  Severity of Pain: 4  Quality of Pain: Other (Comment) (spasm)  Duration of Pain: A few hours  Frequency of Pain: Constant  Aggravating Factors: Stairs, Walking, Standing  Limiting Behavior: Some  Relieving Factors: Rest    Allergies   Allergen Reactions    Morphine Hives    Amitriptyline Nausea Only    Amoxicillin-Pot Clavulanate Nausea Only and Other (See Comments)    Aspirin Other (See Comments)     Sensitive \"gets sick\"    Capsaicin-Menthol-Methyl Sal Dermatitis    Eggs Or Egg-Derived Products Other (See Comments)    Nitrofurantoin Monohyd Macro Nausea Only    Nsaids Nausea Only       Outpatient Medications Marked as Taking for the 7/12/21 encounter (Office Visit) with DIAMOND Arnett CNP   Medication Sig Dispense Refill    orphenadrine (NORFLEX) 100 MG extended release tablet Take 1 tablet by mouth 2 times daily as needed for Muscle spasms 30 tablet 5    mupirocin (BACTROBAN) 2 % cream       lidocaine (LIDODERM) 5 % APPLY 1 PATCH TOPICALLY EVERY TWELVE HOURS AS NEEDED FOR RIB PAIN.       ALPRAZolam (XANAX) 0.25 MG tablet Taking only PRN      diclofenac sodium (VOLTAREN) 1 % GEL Apply topically 4 times daily as needed for Pain 350 g 5    loratadine (CLARITIN) 10 MG capsule Take 1 capsule by mouth daily 30 capsule 5    azelastine (ASTELIN) 0.1 % nasal spray 1 spray by Nasal route 2 times daily Use in each nostril as directed 1 Bottle 5    ferrous sulfate (FE TABS 325) 325 (65 Fe) MG EC tablet Take 1 tablet by mouth 2 times daily 60 tablet 5    ipratropium-albuterol (DUONEB) 0.5-2.5 (3) MG/3ML SOLN nebulizer solution INHALE CONTENTS OF 1 VIAL BY NEBULIZAITON EVERY 6 HOURS AS NEEDED FOR WHEEZING OR SHORTNESS OF BREATH      lisinopril (PRINIVIL;ZESTRIL) 30 MG tablet TAKE ONE TABLET ONCE DAILY      lansoprazole (PREVACID) 30 MG delayed release capsule TAKE ONE CAPSULE BY MOUTH ONCE DAILY      furosemide (LASIX) 40 MG tablet Take 40 mg by mouth daily      albuterol sulfate HFA (VENTOLIN HFA) 108 (90 Base) MCG/ACT inhaler Inhale 2 puffs into the lungs every 6 hours as needed for Wheezing       albuterol (PROVENTIL) (2.5 MG/3ML) 0.083% nebulizer solution Take 2.5 mg by nebulization 3 times daily      colestipol (COLESTID) 1 g tablet Take 1 g by mouth 2 times daily Take 2 tablets by mouth two times a day      fluticasone-salmeterol (ADVAIR DISKUS) 100-50 MCG/DOSE diskus inhaler Inhale 1 puff into the lungs every 12 hours      potassium chloride (MICRO-K) 10 MEQ extended release capsule Take 10 mEq by mouth daily      fluticasone (FLONASE) 50 MCG/ACT nasal spray 2 sprays by Each Nostril route daily         Past Medical History:   Diagnosis Date    Adjustment disorder with mixed emotional features 02/29/2020    Anemia 02/29/2020    Anxiety 02/29/2020    Asthma 03/03/2009    Chronic airway obstruction (HCC)     Chronic idiopathic constipation     Chronic pain     Chronic sinusitis     Colon cancer (HCC)     Degeneration of lumbar intervertebral disc     Diastolic heart failure (HCC)     GERD (gastroesophageal reflux disease) 11/03/2006    Herpes simplex     Hyperlipidemia 12/18/2006    Hypertension     Iron deficiency anemia 02/29/2020    LAYTON (obstructive sleep apnea) 12/06/2017    Osteoarthritis     Sciatica     Thrombosed hemorrhoids        Past Surgical History:   Procedure Laterality Date    PAIN MANAGEMENT PROCEDURE Bilateral 1/18/2021    Bilateral L4-L5 L5-S1 FACET performed by Ale Chatman MD at [address] [place] [address] Family History   Problem Relation Age of Onset    Heart Disease Mother     High Blood Pressure Mother     COPD Father     Heart Disease Father     High Blood Pressure Father     Cancer Father         lung       Social History     Socioeconomic History    Marital status:      Spouse name: None    Number of children: None    Years of education: None    Highest education level: None   Occupational History    None   Tobacco Use    Smoking status: Former Smoker     Packs/day: 1.00     Years: 14.00     Pack years: 14.00     Types: Cigarettes     Start date: 7/10/1970     Quit date: 7/10/1984     Years since quittin.0    Smokeless tobacco: Never Used    Tobacco comment: quit cold turkey   Vaping Use    Vaping Use: Never used   Substance and Sexual Activity    Alcohol use: Yes    Drug use: Never    Sexual activity: None   Other Topics Concern    None   Social History Narrative    None     Social Determinants of Health     Financial Resource Strain:     Difficulty of Paying Living Expenses:    Food Insecurity:     Worried About Running Out of Food in the Last Year:     [address] in the Last Year:    Transportation Needs:     Lack of Transportation (Medical):  Lack of Transportation (Non-Medical):    Physical Activity:     Days of Exercise per Week:     Minutes of Exercise per Session:    Stress:     Feeling of Stress :    Social Connections:     Frequency of Communication with Friends and Family:     Frequency of Social Gatherings with Friends and Family:     Attends Islam Services:     Active Member of Clubs or Organizations:     Attends Club or Organization Meetings:     Marital Status:    Intimate Partner Violence:     Fear of Current or Ex-Partner:     Emotionally Abused:     Physically Abused:     Sexually Abused:      Review of Systems   Constitutional: Positive for activity change. Negative for fever. HENT: Negative for sore throat.     Respiratory: Negative for shortness of breath. Cardiovascular: Negative for chest pain. Gastrointestinal: Positive for diarrhea. Genitourinary: Negative for difficulty urinating. Musculoskeletal: Positive for arthralgias, back pain and myalgias. Skin: Positive for wound. Neurological: Negative for weakness and numbness. Hematological: Bruises/bleeds easily. Psychiatric/Behavioral: Positive for sleep disturbance. Objective:   Physical Exam  Vitals reviewed. Constitutional:       General: She is awake. She is not in acute distress. Appearance: Normal appearance. She is well-developed and well-groomed. She is not ill-appearing, toxic-appearing or diaphoretic. Interventions: She is not intubated. HENT:      Head: Normocephalic and atraumatic. Right Ear: External ear normal.      Left Ear: External ear normal.      Nose: Nose normal.      Mouth/Throat:      Lips: Pink. Mouth: Mucous membranes are moist.   Eyes:      General: Lids are normal. Gaze aligned appropriately. Pulmonary:      Effort: Pulmonary effort is normal. No tachypnea, bradypnea, accessory muscle usage, prolonged expiration, respiratory distress or retractions. She is not intubated. Abdominal:      General: Abdomen is protuberant. Palpations: Abdomen is soft. Musculoskeletal:      Lumbar back: Tenderness (bilateral facets) present. Decreased range of motion (extension induced pain. positive cart sign). Right hip: Tenderness (along IT band) present. Left hip: Tenderness (along IT band) present. Comments: MRI OF THE LUMBAR SPINE WITHOUT CONTRAST, 10/22/2020 8:13 am       TECHNIQUE:   Multiplanar multisequence MRI of the lumbar spine was performed without the   administration of intravenous contrast.       COMPARISON:   None.       HISTORY:   ORDERING SYSTEM PROVIDED HISTORY: DDD (degenerative disc disease), lumbar   TECHNOLOGIST PROVIDED HISTORY:   Reason for Exam:  Low back pain and left hip pain. post-contrast MRI of the lumbar spine   in 3 months is recommended to assess stability.                Skin:     General: Skin is warm and dry. Capillary Refill: Capillary refill takes less than 2 seconds. Coloration: Skin is not ashen. Findings: No rash. Nails: There is no clubbing. Neurological:      Mental Status: She is alert and oriented to person, place, and time. GCS: GCS eye subscore is 4. GCS verbal subscore is 5. GCS motor subscore is 6. Cranial Nerves: No cranial nerve deficit. Psychiatric:         Attention and Perception: Attention and perception normal.         Speech: Speech normal.         Behavior: Behavior normal. Behavior is cooperative. Cognition and Memory: Cognition normal.         Judgment: Judgment normal.         Assessment:       Diagnosis Orders   1. Lumbar facet joint syndrome     2. Degeneration of lumbar intervertebral disc     3. Lumbar radiculopathy     4. Iliotibial band syndrome of both sides             Plan:   Trial Norflex instead of tizanidine.  If ineffective, will consider skelaxin or parafon rachell Huertas, APRN - CNP

## 2021-07-13 ENCOUNTER — OFFICE VISIT (OUTPATIENT)
Dept: SURGERY | Age: 66
End: 2021-07-13
Payer: MEDICARE

## 2021-07-13 VITALS
SYSTOLIC BLOOD PRESSURE: 138 MMHG | HEART RATE: 86 BPM | BODY MASS INDEX: 40.21 KG/M2 | WEIGHT: 227 LBS | DIASTOLIC BLOOD PRESSURE: 88 MMHG | TEMPERATURE: 97.4 F

## 2021-07-13 DIAGNOSIS — S81.802D OPEN WOUND OF LEFT LOWER LEG, SUBSEQUENT ENCOUNTER: ICD-10-CM

## 2021-07-13 DIAGNOSIS — L97.922 LEG ULCER, LEFT, WITH FAT LAYER EXPOSED (HCC): Primary | ICD-10-CM

## 2021-07-13 PROCEDURE — 11042 DBRDMT SUBQ TIS 1ST 20SQCM/<: CPT | Performed by: SURGERY

## 2021-07-13 PROCEDURE — 15271 SKIN SUB GRAFT TRNK/ARM/LEG: CPT | Performed by: SURGERY

## 2021-07-13 PROCEDURE — 99214 OFFICE O/P EST MOD 30 MIN: CPT | Performed by: SURGERY
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
## 2021-07-13 NOTE — PATIENT INSTRUCTIONS
Follow up as scheduled with Dr. Mendoza Warner in wound care. Today a compression dressing has been applied to your lower leg. Its purpose is to reduce swelling and help treat your wound. It will stay on until your next appointment. 1. It must stay dry. You can shower with a bag covering it or purchase a shower boot at  a medical supply store. 2. If the dressing falls more than 2 inches or gets wet, call us ([phone]) to come in  to have it changed. 3. If the top or bottom rolls, you can snip through it to relieve the constriction. 4. To help reduce swelling, when sitting elevate your leg, preferably to heart level. Avoid standing in one place for long periods of time. 5.  A rare complication is a decrease of arterial blood flow to your toes. If your   leg becomes more painful with numbness or tingling or a purple color to your toes,  carefully remove the dressing by cutting it off or unwrapping it. If normal sensation does not return to the limb, seek medical help.

## 2021-07-20 ENCOUNTER — HOSPITAL ENCOUNTER (OUTPATIENT)
Dept: WOUND CARE | Age: 66
Discharge: HOME | End: 2021-07-21
Payer: MEDICARE

## 2021-07-20 VITALS
DIASTOLIC BLOOD PRESSURE: 86 MMHG | WEIGHT: 227.8 LBS | BODY MASS INDEX: 40.36 KG/M2 | SYSTOLIC BLOOD PRESSURE: 128 MMHG | RESPIRATION RATE: 20 BRPM | HEIGHT: 63 IN | TEMPERATURE: 96.5 F | HEART RATE: 72 BPM

## 2021-07-20 DIAGNOSIS — S81.802D OPEN WOUND OF LEFT LOWER LEG, SUBSEQUENT ENCOUNTER: Primary | ICD-10-CM

## 2021-07-20 DIAGNOSIS — M25.561 RIGHT KNEE PAIN, UNSPECIFIED CHRONICITY: Primary | ICD-10-CM

## 2021-07-20 PROCEDURE — 15273 SKIN SUB GRFT T/ARM/LG CHILD: CPT | Performed by: SURGERY

## 2021-07-20 PROCEDURE — 11042 DBRDMT SUBQ TIS 1ST 20SQCM/<: CPT | Performed by: SURGERY

## 2021-07-20 PROCEDURE — 99214 OFFICE O/P EST MOD 30 MIN: CPT

## 2021-07-20 PROCEDURE — 29581 APPL MULTLAYER CMPRN SYS LEG: CPT

## 2021-07-20 RX ORDER — BETAMETHASONE DIPROPIONATE 0.05 %
OINTMENT (GRAM) TOPICAL ONCE
Status: CANCELLED | OUTPATIENT
Start: 2021-07-20 | End: 2021-07-20

## 2021-07-20 RX ORDER — CLOBETASOL PROPIONATE 0.5 MG/G
OINTMENT TOPICAL ONCE
Status: CANCELLED | OUTPATIENT
Start: 2021-07-20 | End: 2021-07-20

## 2021-07-20 RX ORDER — GENTAMICIN SULFATE 1 MG/G
OINTMENT TOPICAL ONCE
Status: CANCELLED | OUTPATIENT
Start: 2021-07-20 | End: 2021-07-20

## 2021-07-20 RX ORDER — LIDOCAINE HYDROCHLORIDE 20 MG/ML
JELLY TOPICAL ONCE
Status: CANCELLED | OUTPATIENT
Start: 2021-07-20 | End: 2021-07-20

## 2021-07-20 RX ORDER — BACITRACIN, NEOMYCIN, POLYMYXIN B 400; 3.5; 5 [USP'U]/G; MG/G; [USP'U]/G
OINTMENT TOPICAL ONCE
Status: CANCELLED | OUTPATIENT
Start: 2021-07-20 | End: 2021-07-20

## 2021-07-20 RX ORDER — BACITRACIN ZINC AND POLYMYXIN B SULFATE 500; 1000 [USP'U]/G; [USP'U]/G
OINTMENT TOPICAL ONCE
Status: CANCELLED | OUTPATIENT
Start: 2021-07-20 | End: 2021-07-20

## 2021-07-20 RX ORDER — LIDOCAINE 40 MG/G
CREAM TOPICAL ONCE
Status: CANCELLED | OUTPATIENT
Start: 2021-07-20 | End: 2021-07-20

## 2021-07-20 RX ORDER — LIDOCAINE HYDROCHLORIDE 40 MG/ML
SOLUTION TOPICAL ONCE
Status: CANCELLED | OUTPATIENT
Start: 2021-07-20 | End: 2021-07-20

## 2021-07-20 RX ORDER — LIDOCAINE 50 MG/G
OINTMENT TOPICAL ONCE
Status: CANCELLED | OUTPATIENT
Start: 2021-07-20 | End: 2021-07-20

## 2021-07-20 RX ORDER — GINSENG 100 MG
CAPSULE ORAL ONCE
Status: CANCELLED | OUTPATIENT
Start: 2021-07-20 | End: 2021-07-20

## 2021-07-20 ASSESSMENT — PAIN SCALES - GENERAL: PAINLEVEL_OUTOF10: 0
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
## 2021-07-20 NOTE — PROGRESS NOTES
NuShield Treatment Note    NAME:  Dharmesh Kaur  YOB: 1955  MEDICAL RECORD NUMBER:  [id number]  DATE:  7/20/2021    Goal:  Patient will receive safe and proper application of skin substitute. Patient will comply with caring for dressing, offloading and reporting complications. Expiration date checked immediately prior to use. Package intact prior to use and no damage noted. Nushield was removed from protective sterile packaging by provider and applied to prepared ulcer bed. NuShield applied with notch to Top Upper Right Corner. NuShield was hydrated with sterile normal saline per provider. NuShield was applied to left leg and affixed with steri-strips by the provider. Applied nonadherent and optilock and TLC wrap (Secondary Dressing)   Coban or ace wrap was applied to secure graft and decrease edema. Patient/caregiver was instructed not to remove dressing and to keep it clean and dry. NuShield may be applied a total of 10 times per wound over a 12 week period. Additionally NuShield may only be used every 12 months per wound. Date of first application of NuShield for this current wound is June 30, 2021.       Application # 4 out of 10  3x4 cm applied to left leg           Guidelines followed    Electronically signed by Agustin Lares RN on 7/20/2021 at 9:29 AM

## 2021-07-27 ENCOUNTER — HOSPITAL ENCOUNTER (OUTPATIENT)
Dept: WOUND CARE | Age: 66
Discharge: HOME | End: 2021-07-28
Payer: MEDICARE

## 2021-07-27 VITALS
BODY MASS INDEX: 40.61 KG/M2 | TEMPERATURE: 96.4 F | HEIGHT: 63 IN | DIASTOLIC BLOOD PRESSURE: 80 MMHG | HEART RATE: 76 BPM | RESPIRATION RATE: 18 BRPM | SYSTOLIC BLOOD PRESSURE: 134 MMHG | WEIGHT: 229.2 LBS

## 2021-07-27 DIAGNOSIS — S81.802D OPEN WOUND OF LEFT LOWER LEG, SUBSEQUENT ENCOUNTER: Primary | ICD-10-CM

## 2021-07-27 PROCEDURE — 99214 OFFICE O/P EST MOD 30 MIN: CPT

## 2021-07-27 PROCEDURE — 15271 SKIN SUB GRAFT TRNK/ARM/LEG: CPT | Performed by: SURGERY

## 2021-07-27 PROCEDURE — 11042 DBRDMT SUBQ TIS 1ST 20SQCM/<: CPT | Performed by: SURGERY

## 2021-07-27 RX ORDER — LIDOCAINE HYDROCHLORIDE 20 MG/ML
JELLY TOPICAL ONCE
Status: CANCELLED | OUTPATIENT
Start: 2021-07-27 | End: 2021-07-27

## 2021-07-27 RX ORDER — LIDOCAINE HYDROCHLORIDE 40 MG/ML
SOLUTION TOPICAL ONCE
Status: CANCELLED | OUTPATIENT
Start: 2021-07-27 | End: 2021-07-27

## 2021-07-27 RX ORDER — CEFDINIR 300 MG/1
CAPSULE ORAL
COMMUNITY
Start: 2021-07-21 | End: 2021-12-20

## 2021-07-27 RX ORDER — GENTAMICIN SULFATE 1 MG/G
OINTMENT TOPICAL ONCE
Status: CANCELLED | OUTPATIENT
Start: 2021-07-27 | End: 2021-07-27

## 2021-07-27 RX ORDER — BETAMETHASONE DIPROPIONATE 0.05 %
OINTMENT (GRAM) TOPICAL ONCE
Status: CANCELLED | OUTPATIENT
Start: 2021-07-27 | End: 2021-07-27

## 2021-07-27 RX ORDER — GINSENG 100 MG
CAPSULE ORAL ONCE
Status: CANCELLED | OUTPATIENT
Start: 2021-07-27 | End: 2021-07-27

## 2021-07-27 RX ORDER — MECLIZINE HYDROCHLORIDE 25 MG/1
TABLET ORAL
COMMUNITY
Start: 2021-07-21 | End: 2022-08-01

## 2021-07-27 RX ORDER — CLOBETASOL PROPIONATE 0.5 MG/G
OINTMENT TOPICAL ONCE
Status: CANCELLED | OUTPATIENT
Start: 2021-07-27 | End: 2021-07-27

## 2021-07-27 RX ORDER — LIDOCAINE 50 MG/G
OINTMENT TOPICAL ONCE
Status: CANCELLED | OUTPATIENT
Start: 2021-07-27 | End: 2021-07-27

## 2021-07-27 RX ORDER — BACITRACIN ZINC AND POLYMYXIN B SULFATE 500; 1000 [USP'U]/G; [USP'U]/G
OINTMENT TOPICAL ONCE
Status: CANCELLED | OUTPATIENT
Start: 2021-07-27 | End: 2021-07-27

## 2021-07-27 RX ORDER — LIDOCAINE 40 MG/G
CREAM TOPICAL ONCE
Status: CANCELLED | OUTPATIENT
Start: 2021-07-27 | End: 2021-07-27

## 2021-07-27 RX ORDER — BACITRACIN, NEOMYCIN, POLYMYXIN B 400; 3.5; 5 [USP'U]/G; MG/G; [USP'U]/G
OINTMENT TOPICAL ONCE
Status: CANCELLED | OUTPATIENT
Start: 2021-07-27 | End: 2021-07-27

## 2021-07-27 ASSESSMENT — PAIN SCALES - GENERAL: PAINLEVEL_OUTOF10: 0
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
## 2021-07-30 ENCOUNTER — HOSPITAL ENCOUNTER (OUTPATIENT)
Dept: GENERAL RADIOLOGY | Age: 66
Discharge: HOME | End: 2021-08-01
Payer: MEDICARE

## 2021-07-30 ENCOUNTER — OFFICE VISIT (OUTPATIENT)
Dept: ORTHOPEDIC SURGERY | Age: 66
End: 2021-07-30
Payer: MEDICARE

## 2021-07-30 VITALS
WEIGHT: 227 LBS | DIASTOLIC BLOOD PRESSURE: 70 MMHG | HEART RATE: 79 BPM | HEIGHT: 63 IN | SYSTOLIC BLOOD PRESSURE: 124 MMHG | BODY MASS INDEX: 40.22 KG/M2 | OXYGEN SATURATION: 95 %

## 2021-07-30 DIAGNOSIS — E66.01 OBESITY, CLASS III, BMI 40-49.9 (MORBID OBESITY) (HCC): ICD-10-CM

## 2021-07-30 DIAGNOSIS — M25.561 RIGHT KNEE PAIN, UNSPECIFIED CHRONICITY: ICD-10-CM

## 2021-07-30 DIAGNOSIS — M17.11 PRIMARY OSTEOARTHRITIS OF RIGHT KNEE: Primary | ICD-10-CM

## 2021-07-30 PROCEDURE — 73562 X-RAY EXAM OF KNEE 3: CPT

## 2021-07-30 PROCEDURE — 1123F ACP DISCUSS/DSCN MKR DOCD: CPT | Performed by: PHYSICIAN ASSISTANT

## 2021-07-30 PROCEDURE — 1036F TOBACCO NON-USER: CPT | Performed by: PHYSICIAN ASSISTANT

## 2021-07-30 PROCEDURE — G8400 PT W/DXA NO RESULTS DOC: HCPCS | Performed by: PHYSICIAN ASSISTANT

## 2021-07-30 PROCEDURE — 99214 OFFICE O/P EST MOD 30 MIN: CPT | Performed by: PHYSICIAN ASSISTANT

## 2021-07-30 PROCEDURE — 1090F PRES/ABSN URINE INCON ASSESS: CPT | Performed by: PHYSICIAN ASSISTANT

## 2021-07-30 PROCEDURE — 3017F COLORECTAL CA SCREEN DOC REV: CPT | Performed by: PHYSICIAN ASSISTANT

## 2021-07-30 PROCEDURE — G8417 CALC BMI ABV UP PARAM F/U: HCPCS | Performed by: PHYSICIAN ASSISTANT

## 2021-07-30 PROCEDURE — G8427 DOCREV CUR MEDS BY ELIG CLIN: HCPCS | Performed by: PHYSICIAN ASSISTANT

## 2021-07-30 PROCEDURE — 4040F PNEUMOC VAC/ADMIN/RCVD: CPT | Performed by: PHYSICIAN ASSISTANT

## 2021-07-30 PROCEDURE — 20610 DRAIN/INJ JOINT/BURSA W/O US: CPT | Performed by: PHYSICIAN ASSISTANT

## 2021-07-30 RX ORDER — METHYLPREDNISOLONE ACETATE 40 MG/ML
40 INJECTION, SUSPENSION INTRA-ARTICULAR; INTRALESIONAL; INTRAMUSCULAR; SOFT TISSUE ONCE
Status: COMPLETED | OUTPATIENT
Start: 2021-07-30 | End: 2021-07-30

## 2021-07-30 RX ORDER — LIDOCAINE HYDROCHLORIDE 10 MG/ML
2 INJECTION, SOLUTION INFILTRATION; PERINEURAL ONCE
Status: COMPLETED | OUTPATIENT
Start: 2021-07-30 | End: 2021-07-30

## 2021-07-30 RX ORDER — BUPIVACAINE HYDROCHLORIDE 5 MG/ML
2 INJECTION, SOLUTION PERINEURAL ONCE
Status: COMPLETED | OUTPATIENT
Start: 2021-07-30 | End: 2021-07-30

## 2021-07-30 RX ADMIN — BUPIVACAINE HYDROCHLORIDE 10 MG: 5 INJECTION, SOLUTION PERINEURAL at 13:38

## 2021-07-30 RX ADMIN — LIDOCAINE HYDROCHLORIDE 2 ML: 10 INJECTION, SOLUTION INFILTRATION; PERINEURAL at 13:39

## 2021-07-30 RX ADMIN — METHYLPREDNISOLONE ACETATE 40 MG: 40 INJECTION, SUSPENSION INTRA-ARTICULAR; INTRALESIONAL; INTRAMUSCULAR; SOFT TISSUE at 13:41

## 2021-07-30 NOTE — PROGRESS NOTES
Orthopedic Office Note  [facility] Christiane Greene 80  [address], [address] [place] [id number] [phone]      CHIEF COMPLAINT:    Chief Complaint   Patient presents with    Knee Pain     Rt       HISTORY OF PRESENT ILLNESS:      The patient is a 73 y.o. female  who presents today for recheck of their right knee. The patient reports twisting her knee and having pain. The patient reports that is primarily over the lateral aspect of the knee. The patient states the knee continues to be painful on a daily basis. The patient states that there is a moderate achy pain worst with activity. Has tried over-the-counter medications as well as ice but continues to have pain. There is mild improvement with rest. The discomfort is causing difficulty sleeping at night. The patient has also had some element of bilateral hip pain/low back pain as well. She has been in pain management and had injections.     Past Medical History:    Past Medical History:   Diagnosis Date    Adjustment disorder with mixed emotional features 02/29/2020    Anemia 02/29/2020    Anxiety 02/29/2020    Asthma 03/03/2009    Chronic airway obstruction (HCC)     Chronic idiopathic constipation     Chronic pain     Chronic sinusitis     Colon cancer (HCC)     Degeneration of lumbar intervertebral disc     Diastolic heart failure (HCC)     GERD (gastroesophageal reflux disease) 11/03/2006    Herpes simplex     Hyperlipidemia 12/18/2006    Hypertension     Iron deficiency anemia 02/29/2020    LAYTON (obstructive sleep apnea) 12/06/2017    Osteoarthritis     Sciatica     Thrombosed hemorrhoids        Past Surgical History:    Past Surgical History:   Procedure Laterality Date    PAIN MANAGEMENT PROCEDURE Bilateral 1/18/2021    Bilateral L4-L5 L5-S1 FACET performed by Tamia Duran MD at [facility] OR       Medications Prior to Admission:   Current Outpatient Medications Medication Sig Dispense Refill    cefdinir (OMNICEF) 300 MG capsule TAKE ONE CAPSULE BY MOUTH TWICE DAILY UNTIL ALL TAKEN      meclizine (ANTIVERT) 25 MG tablet TAKE ONE TABLET BY MOUTH FOUR TIMES DAILY AS NEEDED FOR DIZZINESS      orphenadrine (NORFLEX) 100 MG extended release tablet Take 1 tablet by mouth 2 times daily as needed for Muscle spasms 30 tablet 5    mupirocin (BACTROBAN) 2 % cream       tiZANidine (ZANAFLEX) 4 MG capsule Take 1 capsule by mouth 2 times daily as needed       lidocaine (LIDODERM) 5 % APPLY 1 PATCH TOPICALLY EVERY TWELVE HOURS AS NEEDED FOR RIB PAIN.       ALPRAZolam (XANAX) 0.25 MG tablet Taking only PRN      tiZANidine (ZANAFLEX) 2 MG tablet Take 1-2 tablets tid prn muscle spasms 90 tablet 5    diclofenac sodium (VOLTAREN) 1 % GEL Apply topically 4 times daily as needed for Pain 350 g 5    loratadine (CLARITIN) 10 MG capsule Take 1 capsule by mouth daily 30 capsule 5    azelastine (ASTELIN) 0.1 % nasal spray 1 spray by Nasal route 2 times daily Use in each nostril as directed 1 Bottle 5    ferrous sulfate (FE TABS 325) 325 (65 Fe) MG EC tablet Take 1 tablet by mouth 2 times daily 60 tablet 5    ipratropium-albuterol (DUONEB) 0.5-2.5 (3) MG/3ML SOLN nebulizer solution INHALE CONTENTS OF 1 VIAL BY NEBULIZAITON EVERY 6 HOURS AS NEEDED FOR WHEEZING OR SHORTNESS OF BREATH      lisinopril (PRINIVIL;ZESTRIL) 30 MG tablet TAKE ONE TABLET ONCE DAILY      lansoprazole (PREVACID) 30 MG delayed release capsule TAKE ONE CAPSULE BY MOUTH ONCE DAILY      furosemide (LASIX) 40 MG tablet Take 40 mg by mouth daily      albuterol sulfate HFA (VENTOLIN HFA) 108 (90 Base) MCG/ACT inhaler Inhale 2 puffs into the lungs every 6 hours as needed for Wheezing       albuterol (PROVENTIL) (2.5 MG/3ML) 0.083% nebulizer solution Take 2.5 mg by nebulization 3 times daily      colestipol (COLESTID) 1 g tablet Take 1 g by mouth 2 times daily Take 2 tablets by mouth two times a day      fluticasone-salmeterol (ADVAIR DISKUS) 100-50 MCG/DOSE diskus inhaler Inhale 1 puff into the lungs every 12 hours      potassium chloride (MICRO-K) 10 MEQ extended release capsule Take 10 mEq by mouth daily      fluticasone (FLONASE) 50 MCG/ACT nasal spray 2 sprays by Each Nostril route daily       No current facility-administered medications for this visit. Allergies:  Morphine, Amitriptyline, Amoxicillin-pot clavulanate, Aspirin, Capsaicin-menthol-methyl sal, Eggs or egg-derived products, Nitrofurantoin monohyd macro, and Nsaids    Social History:   Social History     Tobacco Use   Smoking Status Former Smoker    Packs/day: 1.00    Years: 14.00    Pack years: 14.00    Types: Cigarettes    Start date: 7/10/1970   [facility] Quit date: 7/10/1984    Years since quittin.0   Smokeless Tobacco Never Used   Tobacco Comment    quit cold turkey     Social History     Substance and Sexual Activity   Alcohol Use Yes     Social History     Substance and Sexual Activity   Drug Use Never       Family History:  Family History   Problem Relation Age of Onset    Heart Disease Mother     High Blood Pressure Mother     COPD Father     Heart Disease Father     High Blood Pressure Father     Cancer Father         lung         REVIEW OF SYSTEMS:  Please see the ROS form attached to today's encounter. I have reviewed it with the patient during the visit. All other systems were reviewed and are negative. PHYSICAL EXAM:  Patient is a age-appropriate, obese, 73 y.o. female, alert and oriented ×3 and in no acute distress. The patient is well dressed and well groomed and sits/stands with normal body position. The patient is in a calm mood. They are normocephalic with nonlabored respirations and no apparent trauma. The patient has full hip and ankle range of motion. No instability. Ambulates with a minimally antalgic gait.  On detailed examination of the knees, there is full knee range of motion although with crepitance and discomfort. The patient is tender to palpation along the joint lines. No obvious instability with varus or valgus stress testing. Lachman testing and posterior drawer are negative. Very mild effusion. No erythema or warmth. There is no deformity on inspection. The skin is intact with no evidence of skin compromise, open wounds, mass, or lesion. No lymphadenopathy. Palpable pulses distally. Brisk capillary refill. Intact sensation to light touch throughout the extremities. Deep tendon reflexes are intact and symmetric. Good strength in the extremities. Normal muscle tone and bulk. Discomfort with lumbar range of motion. Mildly tender along the lumbar spine. She is also mildly tender along the trochanteric bursa bilaterally. Good hip range of motion. No obvious instability. Radiology:  3 views of the right knee were obtained today in clinic, reviewed and interpreted by myself. Radiographs show tricompartmental osteoarthritis with bone-on-bone arthrosis through the lateral compartment. There are large periarticular osteophytes as well. No fracture or dislocation. ASSESSMENT/PLAN:  1. Primary osteoarthritis of right knee    2. Obesity, Class III, BMI 40-49.9 (morbid obesity) ([facility] [address]        At this point, we discussed conservative and surgical treatment options and alternatives with the patient. I have recommended continued conservative treatment. I have recommended a corticosteroid injection. After discussion of risks and benefits with the patient, they did wish to proceed with the injection. Right knee Intra-articular corticosteroid injection was performed using a combination of 1cc Depo-Medrol and local anesthetic. The patient tolerated the injection well. She understands long-term management would be consideration of total knee arthroplasty. We also discussed the option of approval for viscosupplementation.   We will see her back in 1 month if symptoms persist.  With respect to the hips and back we will continue with observation and she will continue treatment with pain management as needed. We discussed trying to lose weight to take pressure off the joints as well. No orders of the defined types were placed in this encounter.        Roberto Bruce PA-C

## 2021-08-03 ENCOUNTER — HOSPITAL ENCOUNTER (OUTPATIENT)
Dept: WOUND CARE | Age: 66
Discharge: HOME | End: 2021-08-04
Payer: MEDICARE

## 2021-08-03 VITALS
HEART RATE: 83 BPM | WEIGHT: 228 LBS | SYSTOLIC BLOOD PRESSURE: 120 MMHG | RESPIRATION RATE: 18 BRPM | TEMPERATURE: 96.9 F | HEIGHT: 63 IN | DIASTOLIC BLOOD PRESSURE: 82 MMHG | BODY MASS INDEX: 40.4 KG/M2

## 2021-08-03 DIAGNOSIS — S81.802D OPEN WOUND OF LEFT LOWER LEG, SUBSEQUENT ENCOUNTER: Primary | ICD-10-CM

## 2021-08-03 PROCEDURE — 99214 OFFICE O/P EST MOD 30 MIN: CPT

## 2021-08-03 RX ORDER — BETAMETHASONE DIPROPIONATE 0.05 %
OINTMENT (GRAM) TOPICAL ONCE
Status: CANCELLED | OUTPATIENT
Start: 2021-08-03 | End: 2021-08-03

## 2021-08-03 RX ORDER — LIDOCAINE HYDROCHLORIDE 20 MG/ML
JELLY TOPICAL ONCE
Status: CANCELLED | OUTPATIENT
Start: 2021-08-03 | End: 2021-08-03

## 2021-08-03 RX ORDER — LIDOCAINE HYDROCHLORIDE 40 MG/ML
SOLUTION TOPICAL ONCE
Status: CANCELLED | OUTPATIENT
Start: 2021-08-03 | End: 2021-08-03

## 2021-08-03 RX ORDER — BACITRACIN ZINC AND POLYMYXIN B SULFATE 500; 1000 [USP'U]/G; [USP'U]/G
OINTMENT TOPICAL ONCE
Status: CANCELLED | OUTPATIENT
Start: 2021-08-03 | End: 2021-08-03

## 2021-08-03 RX ORDER — CLOBETASOL PROPIONATE 0.5 MG/G
OINTMENT TOPICAL ONCE
Status: CANCELLED | OUTPATIENT
Start: 2021-08-03 | End: 2021-08-03

## 2021-08-03 RX ORDER — BACITRACIN, NEOMYCIN, POLYMYXIN B 400; 3.5; 5 [USP'U]/G; MG/G; [USP'U]/G
OINTMENT TOPICAL ONCE
Status: CANCELLED | OUTPATIENT
Start: 2021-08-03 | End: 2021-08-03

## 2021-08-03 RX ORDER — LIDOCAINE 50 MG/G
OINTMENT TOPICAL ONCE
Status: CANCELLED | OUTPATIENT
Start: 2021-08-03 | End: 2021-08-03

## 2021-08-03 RX ORDER — LIDOCAINE 40 MG/G
CREAM TOPICAL ONCE
Status: CANCELLED | OUTPATIENT
Start: 2021-08-03 | End: 2021-08-03

## 2021-08-03 RX ORDER — GENTAMICIN SULFATE 1 MG/G
OINTMENT TOPICAL ONCE
Status: CANCELLED | OUTPATIENT
Start: 2021-08-03 | End: 2021-08-03

## 2021-08-03 RX ORDER — GINSENG 100 MG
CAPSULE ORAL ONCE
Status: CANCELLED | OUTPATIENT
Start: 2021-08-03 | End: 2021-08-03

## 2021-08-03 ASSESSMENT — PAIN SCALES - GENERAL: PAINLEVEL_OUTOF10: 0
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
## 2021-08-17 ENCOUNTER — HOSPITAL ENCOUNTER (OUTPATIENT)
Dept: WOUND CARE | Age: 66
Discharge: HOME | End: 2021-08-18
Payer: MEDICARE

## 2021-08-17 VITALS
WEIGHT: 233 LBS | HEART RATE: 77 BPM | HEIGHT: 63 IN | RESPIRATION RATE: 18 BRPM | BODY MASS INDEX: 41.29 KG/M2 | DIASTOLIC BLOOD PRESSURE: 70 MMHG | TEMPERATURE: 96.9 F | SYSTOLIC BLOOD PRESSURE: 130 MMHG

## 2021-08-17 DIAGNOSIS — S81.802D OPEN WOUND OF LEFT LOWER LEG, SUBSEQUENT ENCOUNTER: Primary | ICD-10-CM

## 2021-08-17 PROCEDURE — 99214 OFFICE O/P EST MOD 30 MIN: CPT

## 2021-08-17 PROCEDURE — 99213 OFFICE O/P EST LOW 20 MIN: CPT | Performed by: SURGERY

## 2021-08-17 RX ORDER — GINSENG 100 MG
CAPSULE ORAL ONCE
Status: CANCELLED | OUTPATIENT
Start: 2021-08-17 | End: 2021-08-17

## 2021-08-17 RX ORDER — BACITRACIN ZINC AND POLYMYXIN B SULFATE 500; 1000 [USP'U]/G; [USP'U]/G
OINTMENT TOPICAL ONCE
Status: CANCELLED | OUTPATIENT
Start: 2021-08-17 | End: 2021-08-17

## 2021-08-17 RX ORDER — BACITRACIN, NEOMYCIN, POLYMYXIN B 400; 3.5; 5 [USP'U]/G; MG/G; [USP'U]/G
OINTMENT TOPICAL ONCE
Status: CANCELLED | OUTPATIENT
Start: 2021-08-17 | End: 2021-08-17

## 2021-08-17 RX ORDER — GENTAMICIN SULFATE 1 MG/G
OINTMENT TOPICAL ONCE
Status: CANCELLED | OUTPATIENT
Start: 2021-08-17 | End: 2021-08-17

## 2021-08-17 RX ORDER — BETAMETHASONE DIPROPIONATE 0.05 %
OINTMENT (GRAM) TOPICAL ONCE
Status: CANCELLED | OUTPATIENT
Start: 2021-08-17 | End: 2021-08-17

## 2021-08-17 RX ORDER — LIDOCAINE HYDROCHLORIDE 40 MG/ML
SOLUTION TOPICAL ONCE
Status: CANCELLED | OUTPATIENT
Start: 2021-08-17 | End: 2021-08-17

## 2021-08-17 RX ORDER — LIDOCAINE HYDROCHLORIDE 20 MG/ML
JELLY TOPICAL ONCE
Status: CANCELLED | OUTPATIENT
Start: 2021-08-17 | End: 2021-08-17

## 2021-08-17 RX ORDER — CLOBETASOL PROPIONATE 0.5 MG/G
OINTMENT TOPICAL ONCE
Status: CANCELLED | OUTPATIENT
Start: 2021-08-17 | End: 2021-08-17

## 2021-08-17 RX ORDER — LIDOCAINE 50 MG/G
OINTMENT TOPICAL ONCE
Status: CANCELLED | OUTPATIENT
Start: 2021-08-17 | End: 2021-08-17

## 2021-08-17 RX ORDER — LIDOCAINE 40 MG/G
CREAM TOPICAL ONCE
Status: CANCELLED | OUTPATIENT
Start: 2021-08-17 | End: 2021-08-17

## 2021-08-17 ASSESSMENT — PAIN SCALES - GENERAL: PAINLEVEL_OUTOF10: 0
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
## 2021-08-19 ENCOUNTER — TELEMEDICINE (OUTPATIENT)
Dept: PAIN MANAGEMENT | Age: 66
End: 2021-08-19
Payer: MEDICARE

## 2021-08-19 DIAGNOSIS — M54.16 LUMBAR RADICULOPATHY: ICD-10-CM

## 2021-08-19 DIAGNOSIS — M47.816 LUMBAR FACET JOINT SYNDROME: ICD-10-CM

## 2021-08-19 DIAGNOSIS — M79.7 FIBROMYALGIA: Primary | ICD-10-CM

## 2021-08-19 PROCEDURE — G8400 PT W/DXA NO RESULTS DOC: HCPCS | Performed by: NURSE PRACTITIONER

## 2021-08-19 PROCEDURE — 99211 OFF/OP EST MAY X REQ PHY/QHP: CPT | Performed by: NURSE PRACTITIONER

## 2021-08-19 PROCEDURE — 3017F COLORECTAL CA SCREEN DOC REV: CPT | Performed by: NURSE PRACTITIONER

## 2021-08-19 PROCEDURE — G8428 CUR MEDS NOT DOCUMENT: HCPCS | Performed by: NURSE PRACTITIONER

## 2021-08-19 PROCEDURE — 1123F ACP DISCUSS/DSCN MKR DOCD: CPT | Performed by: NURSE PRACTITIONER

## 2021-08-19 PROCEDURE — 4040F PNEUMOC VAC/ADMIN/RCVD: CPT | Performed by: NURSE PRACTITIONER

## 2021-08-19 PROCEDURE — 99214 OFFICE O/P EST MOD 30 MIN: CPT | Performed by: NURSE PRACTITIONER

## 2021-08-19 PROCEDURE — 1090F PRES/ABSN URINE INCON ASSESS: CPT | Performed by: NURSE PRACTITIONER

## 2021-08-19 RX ORDER — GABAPENTIN 100 MG/1
CAPSULE ORAL
Qty: 90 CAPSULE | Refills: 0 | Status: SHIPPED | OUTPATIENT
Start: 2021-08-19 | End: 2021-12-20

## 2021-08-19 RX ORDER — GABAPENTIN 100 MG/1
CAPSULE ORAL
Qty: 180 CAPSULE | Refills: 1 | Status: SHIPPED | OUTPATIENT
Start: 2021-08-19 | End: 2021-08-19 | Stop reason: SDUPTHER

## 2021-08-19 RX ORDER — TIZANIDINE 2 MG/1
TABLET ORAL
Qty: 90 TABLET | Refills: 5 | Status: SHIPPED | OUTPATIENT
Start: 2021-08-19 | End: 2021-12-20

## 2021-08-19 ASSESSMENT — ENCOUNTER SYMPTOMS
DIARRHEA: 1
BACK PAIN: 1
SORE THROAT: 0
SHORTNESS OF BREATH: 0

## 2021-08-19 NOTE — PROGRESS NOTES
2021    TELEHEALTH EVALUATION -- Audio/Visual (During MTOSN-05 public health emergency)    HPI:    Casey Breath Hand (:  1955) has requested an audio/video evaluation for the following concern(s):    Chronic pain recheck. Would like to stay on tizanidine, fluid in ears at the time she thought it was causing dizziness. She does have fibromyalgia, she is painful all over at this time. Review of Systems   Constitutional: Positive for activity change. Negative for fever. HENT: Negative for sore throat. Respiratory: Negative for shortness of breath. Cardiovascular: Negative for chest pain. Gastrointestinal: Positive for diarrhea. Genitourinary: Negative for difficulty urinating. Musculoskeletal: Positive for arthralgias, back pain and myalgias. Skin: Positive for wound. Neurological: Negative for weakness and numbness. Hematological: Bruises/bleeds easily. Psychiatric/Behavioral: Positive for sleep disturbance. Prior to Visit Medications    Medication Sig Taking? Authorizing Provider   tiZANidine (ZANAFLEX) 2 MG tablet Take 1-2 tablets tid prn muscle spasms Yes Dalton Locket, APRN - CNP   gabapentin (NEURONTIN) 100 MG capsule Gabapentin (Neurontin) Dosing Instructions: Take 1 (one) at bedtime for 5 days, then twice daily for 5 days, then three times per day. Yes Dalton Locket APRN - CNP   cefdinir (OMNICEF) 300 MG capsule TAKE ONE CAPSULE BY MOUTH TWICE DAILY UNTIL ALL TAKEN  Historical Provider, MD   meclizine (ANTIVERT) 25 MG tablet TAKE ONE TABLET BY MOUTH FOUR TIMES DAILY AS NEEDED FOR DIZZINESS  Historical Provider, MD   mupirocin (BACTROBAN) 2 % cream   Historical Provider, MD   tiZANidine (ZANAFLEX) 4 MG capsule Take 1 capsule by mouth 2 times daily as needed   Historical Provider, MD   lidocaine (LIDODERM) 5 % APPLY 1 PATCH TOPICALLY EVERY TWELVE HOURS AS NEEDED FOR RIB PAIN.   Historical Provider, MD   ALPRAZolam Ute Guerrero) 0.25 MG tablet Taking only PRN  Historical Provider, MD   diclofenac sodium (VOLTAREN) 1 % GEL Apply topically 4 times daily as needed for Pain  Brooke Fill, APRN - CNP   loratadine (CLARITIN) 10 MG capsule Take 1 capsule by mouth daily  DIAMOND Ahn CNP   azelastine (ASTELIN) 0.1 % nasal spray 1 spray by Nasal route 2 times daily Use in each nostril as directed  DIAMOND Ahn CNP   ferrous sulfate (FE TABS 325) 325 (65 Fe) MG EC tablet Take 1 tablet by mouth 2 times daily  Michael Spears MD   ipratropium-albuterol (DUONEB) 0.5-2.5 (3) MG/3ML SOLN nebulizer solution INHALE CONTENTS OF 1 VIAL BY NEBULIZAITON EVERY 6 HOURS AS NEEDED FOR WHEEZING OR SHORTNESS OF BREATH  Historical Provider, MD   lisinopril (PRINIVIL;ZESTRIL) 30 MG tablet TAKE ONE TABLET ONCE DAILY  Historical Provider, MD   lansoprazole (PREVACID) 30 MG delayed release capsule TAKE ONE CAPSULE BY MOUTH ONCE DAILY  Historical Provider, MD   furosemide (LASIX) 40 MG tablet Take 40 mg by mouth daily  Historical Provider, MD   albuterol sulfate HFA (VENTOLIN HFA) 108 (90 Base) MCG/ACT inhaler Inhale 2 puffs into the lungs every 6 hours as needed for Wheezing   Historical Provider, MD   albuterol (PROVENTIL) (2.5 MG/3ML) 0.083% nebulizer solution Take 2.5 mg by nebulization 3 times daily  Historical Provider, MD   colestipol (COLESTID) 1 g tablet Take 1 g by mouth 2 times daily Take 2 tablets by mouth two times a day  Historical Provider, MD   fluticasone-salmeterol (ADVAIR DISKUS) 100-50 MCG/DOSE diskus inhaler Inhale 1 puff into the lungs every 12 hours  Historical Provider, MD   potassium chloride (MICRO-K) 10 MEQ extended release capsule Take 10 mEq by mouth daily  Historical Provider, MD   fluticasone (FLONASE) 50 MCG/ACT nasal spray 2 sprays by Each Nostril route daily  Historical Provider, MD       Social History     Tobacco Use    Smoking status: Former Smoker     Packs/day: 1.00     Years: 14.00     Pack years: 14.00     Types: Cigarettes Start date: 7/10/1970     Quit date: 7/10/1984     Years since quittin.2    Smokeless tobacco: Never Used    Tobacco comment: quit cold turkey   Vaping Use    Vaping Use: Never used   Substance Use Topics    Alcohol use: Yes    Drug use: Never            PHYSICAL EXAMINATION:  [ INSTRUCTIONS:  \"[x]\" Indicates a positive item  \"[]\" Indicates a negative item  -- DELETE ALL ITEMS NOT EXAMINED]  Vital Signs: (As obtained by patient/caregiver or practitioner observation)    Blood pressure-  Heart rate-    Respiratory rate-    Temperature-  Pulse oximetry-     Constitutional: [x] Appears well-developed and well-nourished [x] No apparent distress      [] Abnormal-   Mental status  [x] Alert and awake  [] Oriented to person/place/time [x]Able to follow commands      Eyes:  EOM    [x]  Normal  [] Abnormal-  Sclera  [x]  Normal  [] Abnormal -         Discharge [x]  None visible  [] Abnormal -    HENT:   [x] Normocephalic, atraumatic. [] Abnormal   [x] Mouth/Throat: Mucous membranes are moist.     External Ears [x] Normal  [] Abnormal-     Neck: [x] No visualized mass     Pulmonary/Chest: [x] Respiratory effort normal.  [x] No visualized signs of difficulty breathing or respiratory distress        [] Abnormal-      Musculoskeletal:   [] Normal gait with no signs of ataxia         [x] Normal range of motion of neck        [] Abnormal-       Neurological:        [] No Facial Asymmetry (Cranial nerve 7 motor function) (limited exam to video visit)          [x] No gaze palsy        [] Abnormal-         Skin:        [x] No significant exanthematous lesions or discoloration noted on facial skin         [] Abnormal-            Psychiatric:       [x] Normal Affect [x] No Hallucinations        [] Abnormal-     Other pertinent observable physical exam findings-     ASSESSMENT/PLAN:   Diagnosis Orders   1. Fibromyalgia     2. Lumbar facet joint syndrome     3.  Lumbar radiculopathy       Controlled Substance Monitoring:    Acute and Chronic Pain Monitoring:   RX Monitoring 5/20/2021   Periodic Controlled Substance Monitoring No signs of potential drug abuse or diversion identified. Remain on tizanidine. Start gabapentin 100mg tid for fibromyalgia    No follow-ups on file. Thu Kaur, was evaluated through a synchronous (real-time) audio-video encounter. The patient (or guardian if applicable) is aware that this is a billable service. Verbal consent to proceed has been obtained within the past 12 months. The visit was conducted pursuant to the emergency declaration under the [address] [place] [address], [address] [place] [address] authority and the [employer] Act. Patient identification was verified, and a caregiver was present when appropriate. The patient was located in a state where the provider was credentialed to provide care. --DIAMOND Almaguer CNP on 8/19/2021 at 1:24 PM    An electronic signature was used to authenticate this note.

## 2021-08-20 ENCOUNTER — TELEPHONE (OUTPATIENT)
Dept: PAIN MANAGEMENT | Age: 66
End: 2021-08-20

## 2021-08-20 NOTE — TELEPHONE ENCOUNTER
Patient was called to schedule 1 month follow up appointment from last visit. Voicemail was left on phone number listed.

## 2021-08-31 ENCOUNTER — HOSPITAL ENCOUNTER (OUTPATIENT)
Dept: WOUND CARE | Age: 66
Discharge: HOME | End: 2021-09-01
Payer: MEDICARE

## 2021-08-31 VITALS
DIASTOLIC BLOOD PRESSURE: 72 MMHG | HEIGHT: 63 IN | WEIGHT: 232 LBS | OXYGEN SATURATION: 94 % | TEMPERATURE: 97 F | HEART RATE: 69 BPM | RESPIRATION RATE: 20 BRPM | SYSTOLIC BLOOD PRESSURE: 128 MMHG | BODY MASS INDEX: 41.11 KG/M2

## 2021-08-31 PROBLEM — S81.802A OPEN WOUND OF LEFT LOWER LEG: Status: RESOLVED | Noted: 2021-05-25 | Resolved: 2021-08-31

## 2021-08-31 PROBLEM — S87.82XA CRUSHING INJURY OF LEFT LEG: Status: RESOLVED | Noted: 2021-06-01 | Resolved: 2021-08-31

## 2021-08-31 PROBLEM — L97.922 LEG ULCER, LEFT, WITH FAT LAYER EXPOSED (HCC): Status: RESOLVED | Noted: 2021-06-01 | Resolved: 2021-08-31

## 2021-08-31 PROBLEM — S81.801A WOUND OF RIGHT LEG: Status: RESOLVED | Noted: 2020-10-27 | Resolved: 2021-08-31

## 2021-08-31 PROCEDURE — 99212 OFFICE O/P EST SF 10 MIN: CPT | Performed by: SURGERY

## 2021-08-31 PROCEDURE — 99214 OFFICE O/P EST MOD 30 MIN: CPT

## 2021-08-31 ASSESSMENT — PAIN SCALES - GENERAL: PAINLEVEL_OUTOF10: 0

## 2021-08-31 NOTE — PLAN OF CARE
Problem: Wound:  Goal: Will show signs of wound healing; wound closure and no evidence of infection  Description: Will show signs of wound healing; wound closure and no evidence of infection  8/31/2021 0910 by Geovani Farnsworth RN  Outcome: Completed  8/31/2021 0854 by Geovani Farnsworth RN  Outcome: Ongoing     Problem: Venous:  Goal: Signs of wound healing will improve  Description: Signs of wound healing will improve  8/31/2021 0910 by Geovani Farnsworth RN  Outcome: Completed  8/31/2021 0854 by Geovani Farnsworth RN  Outcome: Ongoing     Problem: Smoking cessation:  Goal: Ability to formulate a plan to maintain a tobacco-free life will be supported  Description: Ability to formulate a plan to maintain a tobacco-free life will be supported  8/31/2021 0910 by Geovani Farnsworth RN  Outcome: Completed  8/31/2021 0854 by Geovani Farnsworth RN  Outcome: Ongoing     Problem: Compression therapy:  Goal: Will be free from complications associated with compression therapy  Description: Will be free from complications associated with compression therapy  8/31/2021 0910 by Geovani Farnsworth RN  Outcome: Completed  8/31/2021 0854 by Geovani Farnsworth RN  Outcome: Ongoing     Problem: Weight control:  Goal: Ability to maintain an optimal weight for height and age will be supported  Description: Ability to maintain an optimal weight for height and age will be supported  8/31/2021 0910 by Geovani Farnsworth RN  Outcome: Completed  8/31/2021 0854 by Geovani Farnsworth RN  Outcome: Ongoing     Problem: Falls - Risk of:  Goal: Will remain free from falls  Description: Will remain free from falls  8/31/2021 0910 by Geovani Farnsworth RN  Outcome: Completed  8/31/2021 0854 by Geovani Farnsworth RN  Outcome: Ongoing
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
## 2021-09-14 ENCOUNTER — TELEMEDICINE (OUTPATIENT)
Dept: PAIN MANAGEMENT | Age: 66
End: 2021-09-14
Payer: MEDICARE

## 2021-09-14 DIAGNOSIS — M54.16 LUMBAR RADICULOPATHY: ICD-10-CM

## 2021-09-14 DIAGNOSIS — M76.31 ILIOTIBIAL BAND SYNDROME OF BOTH SIDES: Primary | ICD-10-CM

## 2021-09-14 DIAGNOSIS — M51.36 DEGENERATION OF LUMBAR INTERVERTEBRAL DISC: ICD-10-CM

## 2021-09-14 DIAGNOSIS — M47.816 LUMBAR FACET JOINT SYNDROME: ICD-10-CM

## 2021-09-14 DIAGNOSIS — M76.32 ILIOTIBIAL BAND SYNDROME OF BOTH SIDES: Primary | ICD-10-CM

## 2021-09-14 PROCEDURE — 99211 OFF/OP EST MAY X REQ PHY/QHP: CPT | Performed by: NURSE PRACTITIONER

## 2021-09-14 PROCEDURE — 3017F COLORECTAL CA SCREEN DOC REV: CPT | Performed by: NURSE PRACTITIONER

## 2021-09-14 PROCEDURE — G8427 DOCREV CUR MEDS BY ELIG CLIN: HCPCS | Performed by: NURSE PRACTITIONER

## 2021-09-14 PROCEDURE — 1123F ACP DISCUSS/DSCN MKR DOCD: CPT | Performed by: NURSE PRACTITIONER

## 2021-09-14 PROCEDURE — 99214 OFFICE O/P EST MOD 30 MIN: CPT | Performed by: NURSE PRACTITIONER

## 2021-09-14 PROCEDURE — 1090F PRES/ABSN URINE INCON ASSESS: CPT | Performed by: NURSE PRACTITIONER

## 2021-09-14 PROCEDURE — 4040F PNEUMOC VAC/ADMIN/RCVD: CPT | Performed by: NURSE PRACTITIONER

## 2021-09-14 PROCEDURE — G8400 PT W/DXA NO RESULTS DOC: HCPCS | Performed by: NURSE PRACTITIONER

## 2021-09-14 ASSESSMENT — ENCOUNTER SYMPTOMS
DIARRHEA: 1
SORE THROAT: 0
SHORTNESS OF BREATH: 0
BACK PAIN: 1

## 2021-09-14 NOTE — PROGRESS NOTES
2021    TELEHEALTH EVALUATION -- Audio/Visual (During PSYJY-35 public health emergency)    HPI:    Justin Kaur (:  1955) has requested an audio/video evaluation for the following concern(s):    Chronic pain recheck. Would like to stay on tizanidine, fluid in ears at the time she thought it was causing dizziness. She does have fibromyalgia, she is painful all over at this time. Started gabapentin last visit, she felt short of breath, so stopped taking. She was taking 100mg bid prior to stopping, noted relief. SOB continued after stopping, so she thinks maybe it wasn't the gabapentin. History COPD, home O2    Review of Systems   Constitutional: Positive for activity change. Negative for fever. HENT: Negative for sore throat. Respiratory: Negative for shortness of breath. Cardiovascular: Negative for chest pain. Gastrointestinal: Positive for diarrhea. Genitourinary: Negative for difficulty urinating. Musculoskeletal: Positive for arthralgias, back pain and myalgias. Skin: Positive for wound. Neurological: Negative for weakness and numbness. Hematological: Bruises/bleeds easily. Psychiatric/Behavioral: Positive for sleep disturbance. Prior to Visit Medications    Medication Sig Taking? Authorizing Provider   tiZANidine (ZANAFLEX) 2 MG tablet Take 1-2 tablets tid prn muscle spasms  DIAMOND Hernandez CNP   gabapentin (NEURONTIN) 100 MG capsule Gabapentin (Neurontin) Dosing Instructions: Take 1 (one) at bedtime for 5 days, then twice daily for 5 days, then three times per day.   DIAMOND Hernandez CNP   cefdinir (OMNICEF) 300 MG capsule TAKE ONE CAPSULE BY MOUTH TWICE DAILY UNTIL ALL TAKEN  Historical Provider, MD   meclizine (ANTIVERT) 25 MG tablet TAKE ONE TABLET BY MOUTH FOUR TIMES DAILY AS NEEDED FOR DIZZINESS  Historical Provider, MD   mupirocin (BACTROBAN) 2 % cream   Historical Provider, MD   tiZANidine (ZANAFLEX) 4 MG capsule Take 1 capsule by mouth 2 times daily as needed   Historical Provider, MD   lidocaine (LIDODERM) 5 % APPLY 1 PATCH TOPICALLY EVERY TWELVE HOURS AS NEEDED FOR RIB PAIN.   Historical Provider, MD   ALPRAZolam Domi Greta) 0.25 MG tablet Taking only PRN  Historical Provider, MD   diclofenac sodium (VOLTAREN) 1 % GEL Apply topically 4 times daily as needed for Pain  DIAMOND Abreu CNP   loratadine (CLARITIN) 10 MG capsule Take 1 capsule by mouth daily  DIAMOND Benedict CNP   azelastine (ASTELIN) 0.1 % nasal spray 1 spray by Nasal route 2 times daily Use in each nostril as directed  DIAMOND Benedict CNP   ferrous sulfate (FE TABS 325) 325 (65 Fe) MG EC tablet Take 1 tablet by mouth 2 times daily  Charlene Beltran MD   ipratropium-albuterol (DUONEB) 0.5-2.5 (3) MG/3ML SOLN nebulizer solution INHALE CONTENTS OF 1 VIAL BY NEBULIZAITON EVERY 6 HOURS AS NEEDED FOR WHEEZING OR SHORTNESS OF BREATH  Historical Provider, MD   lisinopril (PRINIVIL;ZESTRIL) 30 MG tablet TAKE ONE TABLET ONCE DAILY  Historical Provider, MD   lansoprazole (PREVACID) 30 MG delayed release capsule TAKE ONE CAPSULE BY MOUTH ONCE DAILY  Historical Provider, MD   furosemide (LASIX) 40 MG tablet Take 40 mg by mouth daily  Historical Provider, MD   albuterol sulfate HFA (VENTOLIN HFA) 108 (90 Base) MCG/ACT inhaler Inhale 2 puffs into the lungs every 6 hours as needed for Wheezing   Historical Provider, MD   albuterol (PROVENTIL) (2.5 MG/3ML) 0.083% nebulizer solution Take 2.5 mg by nebulization 3 times daily  Historical Provider, MD   colestipol (COLESTID) 1 g tablet Take 1 g by mouth 2 times daily Take 2 tablets by mouth two times a day  Historical Provider, MD   fluticasone-salmeterol (ADVAIR DISKUS) 100-50 MCG/DOSE diskus inhaler Inhale 1 puff into the lungs every 12 hours  Historical Provider, MD   potassium chloride (MICRO-K) 10 MEQ extended release capsule Take 10 mEq by mouth daily  Historical Provider, MD   fluticasone (FLONASE) 50 MCG/ACT nasal spray 2 sprays by Each Nostril route daily  Historical Provider, MD       Social History     Tobacco Use    Smoking status: Former Smoker     Packs/day: 1.00     Years: 14.00     Pack years: 14.00     Types: Cigarettes     Start date: 7/10/1970     Quit date: 7/10/1984     Years since quittin.2    Smokeless tobacco: Never Used    Tobacco comment: quit cold turkey   Vaping Use    Vaping Use: Never used   Substance Use Topics    Alcohol use: Yes    Drug use: Never            PHYSICAL EXAMINATION:  [ INSTRUCTIONS:  \"[x]\" Indicates a positive item  \"[]\" Indicates a negative item  -- DELETE ALL ITEMS NOT EXAMINED]  Vital Signs: (As obtained by patient/caregiver or practitioner observation)    Blood pressure-  Heart rate-    Respiratory rate-    Temperature-  Pulse oximetry-     Constitutional: [x] Appears well-developed and well-nourished [x] No apparent distress      [] Abnormal-   Mental status  [x] Alert and awake  [] Oriented to person/place/time [x]Able to follow commands      Eyes:  EOM    [x]  Normal  [] Abnormal-  Sclera  [x]  Normal  [] Abnormal -         Discharge [x]  None visible  [] Abnormal -    HENT:   [x] Normocephalic, atraumatic.   [] Abnormal   [x] Mouth/Throat: Mucous membranes are moist.     External Ears [x] Normal  [] Abnormal-     Neck: [x] No visualized mass     Pulmonary/Chest: [x] Respiratory effort normal.  [x] No visualized signs of difficulty breathing or respiratory distress        [] Abnormal-      Musculoskeletal:   [] Normal gait with no signs of ataxia         [x] Normal range of motion of neck        [] Abnormal-       Neurological:        [] No Facial Asymmetry (Cranial nerve 7 motor function) (limited exam to video visit)          [x] No gaze palsy        [] Abnormal-         Skin:        [x] No significant exanthematous lesions or discoloration noted on facial skin         [] Abnormal-            Psychiatric:       [x] Normal Affect [x] No Hallucinations        [] Abnormal-

## 2021-11-15 ENCOUNTER — OFFICE VISIT (OUTPATIENT)
Dept: ORTHOPEDIC SURGERY | Age: 66
End: 2021-11-15
Payer: MEDICARE

## 2021-11-15 VITALS
SYSTOLIC BLOOD PRESSURE: 135 MMHG | HEIGHT: 63 IN | HEART RATE: 87 BPM | WEIGHT: 232 LBS | DIASTOLIC BLOOD PRESSURE: 88 MMHG | BODY MASS INDEX: 41.11 KG/M2

## 2021-11-15 DIAGNOSIS — M17.11 PRIMARY OSTEOARTHRITIS OF RIGHT KNEE: Primary | ICD-10-CM

## 2021-11-15 DIAGNOSIS — M70.62 TROCHANTERIC BURSITIS OF BOTH HIPS: ICD-10-CM

## 2021-11-15 DIAGNOSIS — M70.61 TROCHANTERIC BURSITIS OF BOTH HIPS: ICD-10-CM

## 2021-11-15 PROCEDURE — G8417 CALC BMI ABV UP PARAM F/U: HCPCS | Performed by: ORTHOPAEDIC SURGERY

## 2021-11-15 PROCEDURE — G8400 PT W/DXA NO RESULTS DOC: HCPCS | Performed by: ORTHOPAEDIC SURGERY

## 2021-11-15 PROCEDURE — PBSHW PBB SHADOW CHARGE: Performed by: ORTHOPAEDIC SURGERY

## 2021-11-15 PROCEDURE — 4040F PNEUMOC VAC/ADMIN/RCVD: CPT | Performed by: ORTHOPAEDIC SURGERY

## 2021-11-15 PROCEDURE — 1090F PRES/ABSN URINE INCON ASSESS: CPT | Performed by: ORTHOPAEDIC SURGERY

## 2021-11-15 PROCEDURE — 3017F COLORECTAL CA SCREEN DOC REV: CPT | Performed by: ORTHOPAEDIC SURGERY

## 2021-11-15 PROCEDURE — 99213 OFFICE O/P EST LOW 20 MIN: CPT | Performed by: ORTHOPAEDIC SURGERY

## 2021-11-15 PROCEDURE — 99214 OFFICE O/P EST MOD 30 MIN: CPT | Performed by: ORTHOPAEDIC SURGERY

## 2021-11-15 PROCEDURE — 1123F ACP DISCUSS/DSCN MKR DOCD: CPT | Performed by: ORTHOPAEDIC SURGERY

## 2021-11-15 PROCEDURE — 1036F TOBACCO NON-USER: CPT | Performed by: ORTHOPAEDIC SURGERY

## 2021-11-15 PROCEDURE — G8427 DOCREV CUR MEDS BY ELIG CLIN: HCPCS | Performed by: ORTHOPAEDIC SURGERY

## 2021-11-15 PROCEDURE — 20610 DRAIN/INJ JOINT/BURSA W/O US: CPT | Performed by: ORTHOPAEDIC SURGERY

## 2021-11-15 PROCEDURE — G8484 FLU IMMUNIZE NO ADMIN: HCPCS | Performed by: ORTHOPAEDIC SURGERY

## 2021-11-15 RX ORDER — LIDOCAINE HYDROCHLORIDE 10 MG/ML
2 INJECTION, SOLUTION INFILTRATION; PERINEURAL ONCE
Status: COMPLETED | OUTPATIENT
Start: 2021-11-15 | End: 2021-11-15

## 2021-11-15 RX ORDER — BUPIVACAINE HYDROCHLORIDE 5 MG/ML
2 INJECTION, SOLUTION PERINEURAL ONCE
Status: COMPLETED | OUTPATIENT
Start: 2021-11-15 | End: 2021-11-15

## 2021-11-15 RX ORDER — METHYLPREDNISOLONE ACETATE 40 MG/ML
40 INJECTION, SUSPENSION INTRA-ARTICULAR; INTRALESIONAL; INTRAMUSCULAR; SOFT TISSUE ONCE
Status: COMPLETED | OUTPATIENT
Start: 2021-11-15 | End: 2021-11-15

## 2021-11-15 RX ADMIN — METHYLPREDNISOLONE ACETATE 40 MG: 40 INJECTION, SUSPENSION INTRA-ARTICULAR; INTRALESIONAL; INTRAMUSCULAR; INTRASYNOVIAL; SOFT TISSUE at 10:22

## 2021-11-15 RX ADMIN — LIDOCAINE HYDROCHLORIDE 2 ML: 10 INJECTION, SOLUTION INFILTRATION; PERINEURAL at 10:20

## 2021-11-15 RX ADMIN — METHYLPREDNISOLONE ACETATE 40 MG: 40 INJECTION, SUSPENSION INTRA-ARTICULAR; INTRALESIONAL; INTRAMUSCULAR; INTRASYNOVIAL; SOFT TISSUE at 10:21

## 2021-11-15 RX ADMIN — BUPIVACAINE HYDROCHLORIDE 10 MG: 5 INJECTION, SOLUTION PERINEURAL at 10:19

## 2021-11-15 RX ADMIN — BUPIVACAINE HYDROCHLORIDE 10 MG: 5 INJECTION, SOLUTION PERINEURAL at 10:18

## 2021-11-15 NOTE — PROGRESS NOTES
Orthopedic Office Note  [facility] [address]  [address] [facility], [address]  [facility] [address] [phone]      CHIEF COMPLAINT:    Chief Complaint   Patient presents with    Knee Pain     rech right knee       HISTORY OF PRESENT ILLNESS:      The patient is a 73 y.o. female  who presents today for follow-up of her right knee osteoarthritis and bilateral hip trochanteric bursitis. Previously she has had injections in these locations which provided good pain relief. She reports hips of started to hurt again at nighttime. If she rolls on one side or the other her hips will be painful. Knee has pain with activities of daily living. Additionally she has been having ankle pain and saw a podiatrist who diagnosed ankle arthritis.     Past Medical History:    Past Medical History:   Diagnosis Date    Adjustment disorder with mixed emotional features 02/29/2020    Anemia 02/29/2020    Anxiety 02/29/2020    Asthma 03/03/2009    Chronic airway obstruction (HCC)     Chronic idiopathic constipation     Chronic pain     Chronic sinusitis     Colon cancer (HCC)     Degeneration of lumbar intervertebral disc     Diastolic heart failure (HCC)     GERD (gastroesophageal reflux disease) 11/03/2006    Herpes simplex     Hyperlipidemia 12/18/2006    Hypertension     Iron deficiency anemia 02/29/2020    LAYTON (obstructive sleep apnea) 12/06/2017    Osteoarthritis     Sciatica     Thrombosed hemorrhoids        Past Surgical History:    Past Surgical History:   Procedure Laterality Date    PAIN MANAGEMENT PROCEDURE Bilateral 1/18/2021    Bilateral L4-L5 L5-S1 FACET performed by John Billingsley MD at [address] [place] [address]       Medications Prior to Admission:   Current Outpatient Medications   Medication Sig Dispense Refill    tiZANidine (ZANAFLEX) 2 MG tablet Take 1-2 tablets tid prn muscle spasms 90 tablet 5    cefdinir (OMNICEF) 300 MG capsule TAKE ONE CAPSULE BY MOUTH TWICE DAILY UNTIL ALL TAKEN      meclizine (ANTIVERT) 25 MG tablet TAKE ONE TABLET BY MOUTH FOUR TIMES DAILY AS NEEDED FOR DIZZINESS      mupirocin (BACTROBAN) 2 % cream       tiZANidine (ZANAFLEX) 4 MG capsule Take 1 capsule by mouth 2 times daily as needed       lidocaine (LIDODERM) 5 % APPLY 1 PATCH TOPICALLY EVERY TWELVE HOURS AS NEEDED FOR RIB PAIN.       ALPRAZolam (XANAX) 0.25 MG tablet Taking only PRN      diclofenac sodium (VOLTAREN) 1 % GEL Apply topically 4 times daily as needed for Pain 350 g 5    loratadine (CLARITIN) 10 MG capsule Take 1 capsule by mouth daily 30 capsule 5    azelastine (ASTELIN) 0.1 % nasal spray 1 spray by Nasal route 2 times daily Use in each nostril as directed 1 Bottle 5    ferrous sulfate (FE TABS 325) 325 (65 Fe) MG EC tablet Take 1 tablet by mouth 2 times daily 60 tablet 5    ipratropium-albuterol (DUONEB) 0.5-2.5 (3) MG/3ML SOLN nebulizer solution INHALE CONTENTS OF 1 VIAL BY NEBULIZAITON EVERY 6 HOURS AS NEEDED FOR WHEEZING OR SHORTNESS OF BREATH      lisinopril (PRINIVIL;ZESTRIL) 30 MG tablet TAKE ONE TABLET ONCE DAILY      lansoprazole (PREVACID) 30 MG delayed release capsule TAKE ONE CAPSULE BY MOUTH ONCE DAILY      furosemide (LASIX) 40 MG tablet Take 40 mg by mouth daily      albuterol sulfate HFA (VENTOLIN HFA) 108 (90 Base) MCG/ACT inhaler Inhale 2 puffs into the lungs every 6 hours as needed for Wheezing       albuterol (PROVENTIL) (2.5 MG/3ML) 0.083% nebulizer solution Take 2.5 mg by nebulization 3 times daily      colestipol (COLESTID) 1 g tablet Take 1 g by mouth 2 times daily Take 2 tablets by mouth two times a day      fluticasone-salmeterol (ADVAIR DISKUS) 100-50 MCG/DOSE diskus inhaler Inhale 1 puff into the lungs every 12 hours      potassium chloride (MICRO-K) 10 MEQ extended release capsule Take 10 mEq by mouth daily      fluticasone (FLONASE) 50 MCG/ACT nasal spray 2 sprays by Each Nostril route daily      gabapentin (NEURONTIN) 100 MG capsule Gabapentin (Neurontin) Dosing Instructions: Take 1 (one) at bedtime for 5 days, then twice daily for 5 days, then three times per day. 90 capsule 0     No current facility-administered medications for this visit. Allergies:  Morphine, Amitriptyline, Amoxicillin-pot clavulanate, Aspirin, Capsaicin-menthol-methyl sal, Eggs or egg-derived products, Nitrofurantoin monohyd macro, and Nsaids    Social History:   Social History     Tobacco Use   Smoking Status Former Smoker    Packs/day: 1.00    Years: 14.00    Pack years: 14.00    Types: Cigarettes    Start date: 7/10/1970   [employer] Quit date: 7/10/1984    Years since quittin.3   Smokeless Tobacco Never Used   Tobacco Comment    quit cold turkey     Social History     Substance and Sexual Activity   Alcohol Use Yes     Social History     Substance and Sexual Activity   Drug Use Never       Family History:  Family History   Problem Relation Age of Onset    Heart Disease Mother     High Blood Pressure Mother     COPD Father     Heart Disease Father     High Blood Pressure Father     Cancer Father         lung         REVIEW OF SYSTEMS:  Please see the ROS form attached to today's encounter. I have reviewed it with the patient during the visit. All other systems were reviewed and are negative. PHYSICAL EXAM:  On exam today of her right knee she has a mild joint effusion. She has no erythema or warmth. Pain is reproduced with palpation of the lateral joint line. Both hips are examined and she has pain reproduction with palpation of the greater trochanters. Radiology:  Prior knee and hip x-rays were reviewed and show right knee osteoarthritis that is moderate to severe and bilateral mild hip osteoarthritis    ASSESSMENT/PLAN:  1. Primary osteoarthritis of right knee    2. Trochanteric bursitis of both hips        For her right knee she has requested a repeat corticosteroid injection.   This was performed today in clinic with 1 mL of Depo-Medrol under sterile conditions. She tolerated this well. She also has requested a right hip trochanteric injection performed today in clinic with 1 mL of Depo 40 under sterile conditions. She tolerated this well. She would like to follow-up for her knee and other hip in the near future for injections. No orders of the defined types were placed in this encounter.        Yesenia Lockwood MD

## 2021-12-15 DIAGNOSIS — M25.571 RIGHT ANKLE PAIN, UNSPECIFIED CHRONICITY: Primary | ICD-10-CM

## 2021-12-20 ENCOUNTER — HOSPITAL ENCOUNTER (OUTPATIENT)
Dept: GENERAL RADIOLOGY | Age: 66
Discharge: HOME | End: 2021-12-22
Payer: MEDICARE

## 2021-12-20 ENCOUNTER — OFFICE VISIT (OUTPATIENT)
Dept: ORTHOPEDIC SURGERY | Age: 66
End: 2021-12-20
Payer: MEDICARE

## 2021-12-20 VITALS
SYSTOLIC BLOOD PRESSURE: 114 MMHG | DIASTOLIC BLOOD PRESSURE: 80 MMHG | HEART RATE: 87 BPM | WEIGHT: 232 LBS | BODY MASS INDEX: 41.11 KG/M2 | HEIGHT: 63 IN

## 2021-12-20 DIAGNOSIS — M25.571 RIGHT ANKLE PAIN, UNSPECIFIED CHRONICITY: ICD-10-CM

## 2021-12-20 DIAGNOSIS — M21.41 PES PLANOVALGUS, ACQUIRED, RIGHT: ICD-10-CM

## 2021-12-20 DIAGNOSIS — M76.829 POSTERIOR TIBIAL TENDON DYSFUNCTION: Primary | ICD-10-CM

## 2021-12-20 PROCEDURE — 3017F COLORECTAL CA SCREEN DOC REV: CPT | Performed by: ORTHOPAEDIC SURGERY

## 2021-12-20 PROCEDURE — 1036F TOBACCO NON-USER: CPT | Performed by: ORTHOPAEDIC SURGERY

## 2021-12-20 PROCEDURE — 1123F ACP DISCUSS/DSCN MKR DOCD: CPT | Performed by: ORTHOPAEDIC SURGERY

## 2021-12-20 PROCEDURE — 73610 X-RAY EXAM OF ANKLE: CPT

## 2021-12-20 PROCEDURE — G8484 FLU IMMUNIZE NO ADMIN: HCPCS | Performed by: ORTHOPAEDIC SURGERY

## 2021-12-20 PROCEDURE — 4040F PNEUMOC VAC/ADMIN/RCVD: CPT | Performed by: ORTHOPAEDIC SURGERY

## 2021-12-20 PROCEDURE — 1090F PRES/ABSN URINE INCON ASSESS: CPT | Performed by: ORTHOPAEDIC SURGERY

## 2021-12-20 PROCEDURE — 99214 OFFICE O/P EST MOD 30 MIN: CPT | Performed by: ORTHOPAEDIC SURGERY

## 2021-12-20 PROCEDURE — G8400 PT W/DXA NO RESULTS DOC: HCPCS | Performed by: ORTHOPAEDIC SURGERY

## 2021-12-20 PROCEDURE — G8427 DOCREV CUR MEDS BY ELIG CLIN: HCPCS | Performed by: ORTHOPAEDIC SURGERY

## 2021-12-20 PROCEDURE — G8417 CALC BMI ABV UP PARAM F/U: HCPCS | Performed by: ORTHOPAEDIC SURGERY

## 2021-12-20 PROCEDURE — L4387 NON-PNEUM WALK BOOT PRE OTS: HCPCS | Performed by: ORTHOPAEDIC SURGERY

## 2021-12-20 NOTE — PROGRESS NOTES
Orthopedic Office Note  [facility] [address]  [address], [address]  [facility] [address] [phone]      CHIEF COMPLAINT:    Chief Complaint   Patient presents with    Ankle Injury     right ankle pain/ films here       HISTORY OF PRESENT ILLNESS:      The patient is a 73 y.o. female  who presents today for evaluation of her right ankle. She reports pain since September without a specific injury. She reports she has good days and bad days. Symptoms are worse with weightbearing. She reports she has seen a podiatrist who told her the problem is arthritis and that there is no treatment for this.     Past Medical History:    Past Medical History:   Diagnosis Date    Adjustment disorder with mixed emotional features 02/29/2020    Anemia 02/29/2020    Anxiety 02/29/2020    Asthma 03/03/2009    Chronic airway obstruction (HCC)     Chronic idiopathic constipation     Chronic pain     Chronic sinusitis     Colon cancer (HCC)     Degeneration of lumbar intervertebral disc     Diastolic heart failure (HCC)     GERD (gastroesophageal reflux disease) 11/03/2006    Herpes simplex     Hyperlipidemia 12/18/2006    Hypertension     Iron deficiency anemia 02/29/2020    LAYTON (obstructive sleep apnea) 12/06/2017    Osteoarthritis     Sciatica     Thrombosed hemorrhoids        Past Surgical History:    Past Surgical History:   Procedure Laterality Date    PAIN MANAGEMENT PROCEDURE Bilateral 1/18/2021    Bilateral L4-L5 L5-S1 FACET performed by Caleb Eduardo MD at [address] [place] [address]       Medications Prior to Admission:   Current Outpatient Medications   Medication Sig Dispense Refill    meclizine (ANTIVERT) 25 MG tablet TAKE ONE TABLET BY MOUTH FOUR TIMES DAILY AS NEEDED FOR DIZZINESS      ALPRAZolam (XANAX) 0.25 MG tablet Taking only PRN      loratadine (CLARITIN) 10 MG capsule Take 1 capsule by mouth daily 30 capsule 5    azelastine (ASTELIN) 0.1 % nasal spray 1 spray by Nasal route 2 times daily Use in each nostril as directed 1 Bottle 5    ferrous sulfate (FE TABS 325) 325 (65 Fe) MG EC tablet Take 1 tablet by mouth 2 times daily 60 tablet 5    ipratropium-albuterol (DUONEB) 0.5-2.5 (3) MG/3ML SOLN nebulizer solution INHALE CONTENTS OF 1 VIAL BY NEBULIZAITON EVERY 6 HOURS AS NEEDED FOR WHEEZING OR SHORTNESS OF BREATH      lisinopril (PRINIVIL;ZESTRIL) 30 MG tablet TAKE ONE TABLET ONCE DAILY      lansoprazole (PREVACID) 30 MG delayed release capsule TAKE ONE CAPSULE BY MOUTH ONCE DAILY      furosemide (LASIX) 40 MG tablet Take 40 mg by mouth daily      albuterol sulfate HFA (VENTOLIN HFA) 108 (90 Base) MCG/ACT inhaler Inhale 2 puffs into the lungs every 6 hours as needed for Wheezing       albuterol (PROVENTIL) (2.5 MG/3ML) 0.083% nebulizer solution Take 2.5 mg by nebulization 3 times daily      colestipol (COLESTID) 1 g tablet Take 1 g by mouth 2 times daily Take 2 tablets by mouth two times a day      fluticasone-salmeterol (ADVAIR DISKUS) 100-50 MCG/DOSE diskus inhaler Inhale 1 puff into the lungs every 12 hours      potassium chloride (MICRO-K) 10 MEQ extended release capsule Take 10 mEq by mouth daily      fluticasone (FLONASE) 50 MCG/ACT nasal spray 2 sprays by Each Nostril route daily      diclofenac sodium (VOLTAREN) 1 % GEL Apply topically 4 times daily as needed for Pain 350 g 5     No current facility-administered medications for this visit.        Allergies:  Morphine, Amitriptyline, Amoxicillin-pot clavulanate, Aspirin, Capsaicin-menthol-methyl sal, Eggs or egg-derived products, Nitrofurantoin monohyd macro, and Nsaids    Social History:   Social History     Tobacco Use   Smoking Status Former Smoker    Packs/day: 1.00    Years: 14.00    Pack years: 14.00    Types: Cigarettes    Start date: 7/10/1970   Evon Babb Quit date: 7/10/1984    Years since quittin.4   Smokeless Tobacco Never Used   Tobacco Comment    quit cold turkey     Social History     Substance and Sexual Activity   Alcohol Use Yes     Social History     Substance and Sexual Activity   Drug Use Never       Family History:  Family History   Problem Relation Age of Onset    Heart Disease Mother     High Blood Pressure Mother     COPD Father     Heart Disease Father     High Blood Pressure Father     Cancer Father         lung         REVIEW OF SYSTEMS:  Please see the ROS form attached to today's encounter. I have reviewed it with the patient during the visit. All other systems were reviewed and are negative. PHYSICAL EXAM:  Examination today of her right foot reveals pain that she localizes to the posterior tibial tendon. She is tender to palpation along the posterior tibial tendon from below the medial malleolus proximally to the muscle tendon junction. She has a significant pes planovalgus deformity. Her skin is intact with good capillary refill. Radiology:  X-rays show mild midfoot arthritis but no ankle joint arthritis. ASSESSMENT/PLAN:  1. Posterior tibial tendon dysfunction    2. Pes planovalgus, acquired, right        I recommended a cam boot with a walker and partial weightbearing. She will follow-up in 6 weeks to reassess her progress. We have also discussed then once her pain is diminished getting more supportive shoes with arch support. We have also discussed surgical treatment if symptoms become chronic. No orders of the defined types were placed in this encounter.        Corbin Olivo MD

## 2022-02-02 DIAGNOSIS — M25.571 RIGHT ANKLE PAIN, UNSPECIFIED CHRONICITY: Primary | ICD-10-CM

## 2022-02-15 ENCOUNTER — TELEPHONE (OUTPATIENT)
Dept: SLEEP CENTER | Age: 67
End: 2022-02-15

## 2022-03-16 DIAGNOSIS — M25.571 RIGHT ANKLE PAIN, UNSPECIFIED CHRONICITY: Primary | ICD-10-CM

## 2022-04-21 ENCOUNTER — HOSPITAL ENCOUNTER (OUTPATIENT)
Dept: LAB | Age: 67
Discharge: HOME | End: 2022-04-21
Payer: MEDICARE

## 2022-04-21 DIAGNOSIS — D50.0 IRON DEFICIENCY ANEMIA DUE TO CHRONIC BLOOD LOSS: ICD-10-CM

## 2022-04-21 LAB
ABSOLUTE EOS #: 0.18 K/UL (ref 0–0.44)
ABSOLUTE IMMATURE GRANULOCYTE: <0.03 K/UL (ref 0–0.3)
ABSOLUTE LYMPH #: 1.49 K/UL (ref 1.1–3.7)
ABSOLUTE MONO #: 0.59 K/UL (ref 0.1–1.2)
BASOPHILS # BLD: 1 % (ref 0–2)
BASOPHILS ABSOLUTE: 0.05 K/UL (ref 0–0.2)
EOSINOPHILS RELATIVE PERCENT: 3 % (ref 1–4)
FERRITIN: 55 NG/ML (ref 13–150)
HCT VFR BLD CALC: 39.8 % (ref 36.3–47.1)
HEMOGLOBIN: 12.8 G/DL (ref 11.9–15.1)
IMMATURE GRANULOCYTES: 0 %
IRON SATURATION: 28 % (ref 20–55)
IRON: 78 UG/DL (ref 37–145)
LYMPHOCYTES # BLD: 21 % (ref 24–43)
MCH RBC QN AUTO: 31.1 PG (ref 25.2–33.5)
MCHC RBC AUTO-ENTMCNC: 32.2 G/DL (ref 25.2–33.5)
MCV RBC AUTO: 96.8 FL (ref 82.6–102.9)
MONOCYTES # BLD: 8 % (ref 3–12)
NRBC AUTOMATED: 0 PER 100 WBC
PDW BLD-RTO: 15 % (ref 11.8–14.4)
PLATELET # BLD: 334 K/UL (ref 138–453)
PMV BLD AUTO: 9.1 FL (ref 8.1–13.5)
RBC # BLD: 4.11 M/UL (ref 3.95–5.11)
RBC # BLD: ABNORMAL 10*6/UL
SEG NEUTROPHILS: 67 % (ref 36–65)
SEGMENTED NEUTROPHILS ABSOLUTE COUNT: 4.95 K/UL (ref 1.5–8.1)
TOTAL IRON BINDING CAPACITY: 281 UG/DL (ref 250–450)
UNSATURATED IRON BINDING CAPACITY: 203 UG/DL (ref 112–347)
WBC # BLD: 7.3 K/UL (ref 3.5–11.3)

## 2022-04-21 PROCEDURE — 85025 COMPLETE CBC W/AUTO DIFF WBC: CPT

## 2022-04-21 PROCEDURE — 83540 ASSAY OF IRON: CPT

## 2022-04-21 PROCEDURE — 82728 ASSAY OF FERRITIN: CPT

## 2022-04-21 PROCEDURE — 83550 IRON BINDING TEST: CPT

## 2022-04-21 PROCEDURE — 36415 COLL VENOUS BLD VENIPUNCTURE: CPT

## 2022-04-25 ENCOUNTER — OFFICE VISIT (OUTPATIENT)
Dept: ONCOLOGY | Age: 67
End: 2022-04-25
Payer: MEDICARE

## 2022-04-25 VITALS
OXYGEN SATURATION: 94 % | WEIGHT: 222 LBS | HEIGHT: 63 IN | HEART RATE: 90 BPM | SYSTOLIC BLOOD PRESSURE: 126 MMHG | TEMPERATURE: 98.1 F | BODY MASS INDEX: 39.34 KG/M2 | DIASTOLIC BLOOD PRESSURE: 72 MMHG

## 2022-04-25 DIAGNOSIS — E66.01 SEVERE OBESITY (BMI 35.0-39.9) WITH COMORBIDITY (HCC): ICD-10-CM

## 2022-04-25 DIAGNOSIS — J44.9 CHRONIC OBSTRUCTIVE PULMONARY DISEASE, UNSPECIFIED COPD TYPE (HCC): ICD-10-CM

## 2022-04-25 DIAGNOSIS — D50.0 IRON DEFICIENCY ANEMIA DUE TO CHRONIC BLOOD LOSS: Primary | ICD-10-CM

## 2022-04-25 PROCEDURE — G8417 CALC BMI ABV UP PARAM F/U: HCPCS | Performed by: INTERNAL MEDICINE

## 2022-04-25 PROCEDURE — G8400 PT W/DXA NO RESULTS DOC: HCPCS | Performed by: INTERNAL MEDICINE

## 2022-04-25 PROCEDURE — 1090F PRES/ABSN URINE INCON ASSESS: CPT | Performed by: INTERNAL MEDICINE

## 2022-04-25 PROCEDURE — 99214 OFFICE O/P EST MOD 30 MIN: CPT | Performed by: INTERNAL MEDICINE

## 2022-04-25 PROCEDURE — 1123F ACP DISCUSS/DSCN MKR DOCD: CPT | Performed by: INTERNAL MEDICINE

## 2022-04-25 PROCEDURE — 3023F SPIROM DOC REV: CPT | Performed by: INTERNAL MEDICINE

## 2022-04-25 PROCEDURE — G8427 DOCREV CUR MEDS BY ELIG CLIN: HCPCS | Performed by: INTERNAL MEDICINE

## 2022-04-25 PROCEDURE — 3017F COLORECTAL CA SCREEN DOC REV: CPT | Performed by: INTERNAL MEDICINE

## 2022-04-25 PROCEDURE — 1036F TOBACCO NON-USER: CPT | Performed by: INTERNAL MEDICINE

## 2022-04-25 PROCEDURE — 4040F PNEUMOC VAC/ADMIN/RCVD: CPT | Performed by: INTERNAL MEDICINE

## 2022-04-25 RX ORDER — MONTELUKAST SODIUM 10 MG/1
TABLET ORAL
COMMUNITY
Start: 2022-03-16

## 2022-04-25 RX ORDER — BENZONATATE 100 MG/1
100 CAPSULE ORAL 3 TIMES DAILY PRN
COMMUNITY

## 2022-04-25 RX ORDER — GUAIFENESIN 600 MG/1
TABLET, EXTENDED RELEASE ORAL
COMMUNITY
Start: 2022-02-11

## 2022-04-25 RX ORDER — FAMOTIDINE 20 MG/1
20 TABLET, FILM COATED ORAL 2 TIMES DAILY
COMMUNITY

## 2022-04-25 NOTE — PROGRESS NOTES
Patient ID: Sri De Leon, 1955, 2916, 78 y.o. Referred by : Angie Kelley MD  Reason for consultation:   Anemia  Iron deficiency status post IV iron infusion  Currently on 1 iron pill daily  HISTORY OF PRESENT ILLNESS:    Hematologic history:  Jackie Jackson is a 70-year-old female with history of chronic anemia was seen there initial consultation visit. She reports that she has history of anemia for past several years and for past 2 years she has been receiving intermittent PRBC and iron transfusions from her primary care physician. She reports her last transfusion was about in August 2019 prior to her vascular surgery. She reports she has taken iron pills in the past but had significant GI side effects so she had stopped. She reports history of IBS and also had upper endoscopy and colonoscopy in 2018 was negative for any active bleeding. Her GI evaluation was done at [facility]. Most recently her stool for occult blood was negative checked in March of this year. Her CBC on 3/18/2020 showed hemoglobin of 9.9, WBC 10.2, platelets 347. Her ferritin 13.3, iron 15, TIBC 366 and iron saturation 4% noted on 3/18/2020. INTERVAL HISTORY:   Jackie Jackson is returning for follow-up visit and to discuss lab results and further recommendations. She complains of more tiredness and shortness of breath recently. She is on iron pills and tolerating well. Her recent lab work showed normal hemoglobin and iron studies. She has history of COPD and also CPAP. She is requesting referral to new pulmonary  During this visit patient's allergy, social, medical, surgical history and medications were reviewed and updated.     Past Medical History:   Diagnosis Date    Adjustment disorder with mixed emotional features 02/29/2020    Anemia 02/29/2020    Anxiety 02/29/2020    Asthma 03/03/2009    Chronic airway obstruction (HCC)     Chronic idiopathic constipation     Chronic pain     Chronic sinusitis     Colon cancer ([address]     Degeneration of lumbar intervertebral disc     Diastolic heart failure (HCC)     GERD (gastroesophageal reflux disease) 11/03/2006    Herpes simplex     Hyperlipidemia 12/18/2006    Hypertension     Iron deficiency anemia 02/29/2020    LAYTON (obstructive sleep apnea) 12/06/2017    Osteoarthritis     Sciatica     Thrombosed hemorrhoids      Past Surgical History:   Procedure Laterality Date    PAIN MANAGEMENT PROCEDURE Bilateral 1/18/2021    Bilateral L4-L5 L5-S1 FACET performed [address] Rafa Pham MD [address]   Allergen Reactions    Morphine Hives    Amitriptyline Nausea Only    Amoxicillin-Pot Clavulanate Nausea Only and Other (See Comments)    Aspirin Other (See Comments)     Sensitive \"gets sick\"    Capsaicin-Menthol-Methyl Sal Dermatitis    Eggs Or Egg-Derived Products Other (See Comments)    Nitrofurantoin Monohyd Macro Nausea Only    Nsaids Nausea Only       Current Outpatient Medications   Medication Sig Dispense Refill    GNP MUCUS  MG extended release tablet TAKE TWO TABLETS BY MOUTH TWICE DAILY      montelukast (SINGULAIR) 10 MG tablet TAKE ONE TABLET BY MOUTH ONCE DAILY AT NIGHT      famotidine (PEPCID) 20 MG tablet Take 20 mg by mouth 2 times daily      benzonatate (TESSALON) 100 MG capsule Take 100 mg by mouth 3 times daily as needed for Cough      meclizine (ANTIVERT) 25 MG tablet TAKE ONE TABLET BY MOUTH FOUR TIMES DAILY AS NEEDED FOR DIZZINESS      loratadine (CLARITIN) 10 MG capsule Take 1 capsule by mouth daily 30 capsule 5    azelastine (ASTELIN) 0.1 % nasal spray 1 spray by Nasal route 2 times daily Use in each nostril as directed 1 Bottle 5    ferrous sulfate (FE TABS 325) 325 (65 Fe) MG EC tablet Take 1 tablet by mouth 2 times daily 60 tablet 5    ipratropium-albuterol (DUONEB) 0.5-2.5 (3) MG/3ML SOLN nebulizer solution INHALE CONTENTS OF 1 VIAL BY NEBULIZAITON EVERY 6 HOURS AS NEEDED FOR WHEEZING OR SHORTNESS OF BREATH      lisinopril (PRINIVIL;ZESTRIL) 30 MG tablet TAKE ONE TABLET ONCE DAILY      lansoprazole (PREVACID) 30 MG delayed release capsule TAKE ONE CAPSULE BY MOUTH ONCE DAILY      furosemide (LASIX) 40 MG tablet Take 40 mg by mouth daily      albuterol sulfate HFA (VENTOLIN HFA) 108 (90 Base) MCG/ACT inhaler Inhale 2 puffs into the lungs every 6 hours as needed for Wheezing       albuterol (PROVENTIL) (2.5 MG/3ML) 0.083% nebulizer solution Take 2.5 mg by nebulization 3 times daily      fluticasone-salmeterol (ADVAIR DISKUS) 100-50 MCG/DOSE diskus inhaler Inhale 1 puff into the lungs every 12 hours      potassium chloride (MICRO-K) 10 MEQ extended release capsule Take 10 mEq by mouth daily      fluticasone (FLONASE) 50 MCG/ACT nasal spray 2 sprays by Each Nostril route daily      ALPRAZolam (XANAX) 0.25 MG tablet Taking only PRN (Patient not taking: Reported on 2022)      colestipol (COLESTID) 1 g tablet Take 1 g by mouth 2 times daily Take 2 tablets by mouth two times a day (Patient not taking: Reported on 2022)       No current facility-administered medications for this visit. Social History     Socioeconomic History    Marital status:      Spouse name: Not on file    Number of children: Not on file    Years of education: Not on file    Highest education level: Not on file   Occupational History    Not on file   Tobacco Use    Smoking status: Former Smoker     Packs/day: 1.00     Years: 14.00     Pack years: 14.00     Types: Cigarettes     Start date: 7/10/1970     Quit date: 7/10/1984     Years since quittin.8    Smokeless tobacco: Never Used    Tobacco comment: quit cold turkey   Vaping Use    Vaping Use: Never used   Substance and Sexual Activity    Alcohol use:  Yes    Drug use: Never    Sexual activity: Not on file   Other Topics Concern    Not on file   Social History Narrative    Not on file     Social Determinants of Health     Financial Resource Strain:     Difficulty of Paying Living Expenses: Not on file   Food Insecurity:     Worried About Running Out of Food in the Last Year: Not on file    Ran Out of Food in the Last Year: Not on file   Transportation Needs:     Lack of Transportation (Medical): Not on file    Lack of Transportation (Non-Medical): Not on file   Physical Activity:     Days of Exercise per Week: Not on file    Minutes of Exercise per Session: Not on file   Stress:     Feeling of Stress : Not on file   Social Connections:     Frequency of Communication with Friends and Family: Not on file    Frequency of Social Gatherings with Friends and Family: Not on file    Attends [employer] Services: Not on file    Active Member of [address] [place] [address] [employer] or Organizations: Not on file    Attends Club or Organization Meetings: Not on file    Marital Status: Not on file   Intimate Partner Violence:     Fear of Current or Ex-Partner: Not on file    Emotionally Abused: Not on file    Physically Abused: Not on file    Sexually Abused: Not on file   Housing Stability:     Unable to Pay for Housing in the Last Year: Not on file    Number of [place] in the Last Year: Not on file    Unstable Housing in the Last Year: Not on file     Family History   Problem Relation Age of Onset    Heart Disease Mother     High Blood Pressure Mother     COPD Father     Heart Disease Father     High Blood Pressure Father     Cancer Father         lung      REVIEW OF SYSTEM:   Constitutional: No fever or chills.  No night sweats, no weight loss   Eyes: No eye discharge, double vision, or eye pain   HEENT: negative for sore mouth, sore throat, hoarseness and voice change   Respiratory: negative for cough , sputum, dyspnea, wheezing, hemoptysis, chest pain   Cardiovascular: negative for chest pain, dyspnea, palpitations, orthopnea, PND   Gastrointestinal: negative for nausea, vomiting, diarrhea, constipation, abdominal pain, Dysphagia, hematemesis and hematochezia   Genitourinary: negative for frequency, dysuria, nocturia, urinary incontinence, and hematuria   Integument: negative for rash, skin lesions, bruises.    Hematologic/Lymphatic: negative for easy bruising, bleeding, lymphadenopathy, petechiae and swelling/edema   Endocrine: negative for heat or cold intolerance, tremor, weight changes, change in bowel habits and hair loss   Musculoskeletal: negative for myalgias, arthralgias, pain, joint swelling,and bone pain   Neurological: negative for headaches, dizziness, seizures, weakness, numbness  OBJECTIVE:        Vitals:    04/25/22 0941   BP: 126/72   Pulse: 90   Temp: 98.1 °F (36.7 °C)   SpO2: 94%     PHYSICAL EXAMINATION:      LABORATORY DATA:     Lab Results   Component Value Date    WBC 7.3 04/21/2022    HGB 12.8 04/21/2022    HCT 39.8 04/21/2022    MCV 96.8 04/21/2022     04/21/2022    LYMPHOPCT 21 (L) 04/21/2022    RBC 4.11 04/21/2022    MCH 31.1 04/21/2022    MCHC 32.2 04/21/2022    RDW 15.0 (H) 04/21/2022    MONOPCT 8 04/21/2022    BASOPCT 1 04/21/2022    NEUTROABS 4.95 04/21/2022    LYMPHSABS 1.49 04/21/2022    MONOSABS 0.59 04/21/2022    EOSABS 0.18 04/21/2022    BASOSABS 0.05 04/21/2022         Chemistry        Component Value Date/Time     06/08/2021 0950    K 4.4 06/08/2021 0950     06/08/2021 0950    CO2 29 06/08/2021 0950    BUN 11 06/08/2021 0950    CREATININE 0.52 06/08/2021 0950        Component Value Date/Time    CALCIUM 9.5 06/08/2021 0950    ALKPHOS 113 (H) 09/28/2020 1126    AST 16 09/28/2020 1126    ALT 30 09/28/2020 1126    BILITOT 1.01 09/28/2020 1126      Iron Profile (03/18/2020 11:58 AM EDT)  Iron Profile (03/18/2020 11:58 AM EDT)   Component Value Ref Range Performed At Pathologist Signature   Iron 15 (L) 39 - 145 ug/dL [facility]     TIBC 366 260 - 445 ug/dL [facility]     UIBC 351 110 - 370 ug/dL [facility]     % Sat 4 (L) 20 - 55 % [facility]       Iron Profile (03/18/2020 11:58 AM EDT) Specimen       PATHOLOGY DATA:   Reviewed   IMAGING DATA:    Reviewed  ASSESSMENT:    Nidhi Smith is a 78 y.o.  female with chronic anemia and most likely secondary to iron deficiency. She has history of iron intolerance therefore she has been receiving iron infusions in the past with her primary care physician. She has had GI evaluation in 2018 as reported by patient and was negative. She never had a video capsule endoscopy which would be my next recommendation. She had negative stool for occult blood checked recently. I reviewed the recent lab work which showed no evidence of hemolysis. Her peripheral blood smear was unremarkable. Continue oral iron supplementation at this point. I discussed the risk benefit and she is agreeable. PLAN:   I reviewed her recent lab work, discussed diagnosis and treatment recommendations  Return to clinic in 1 year with labs prior    Abhay R. Olympia Essex, MD  Hematologist/Medical Oncologist    On this date 4/25/22  I have spent 40 minutes reviewing previous notes, test results and face to face with the patient discussing the diagnosis and importance of compliance with the treatment plan. Greater than 50% of that time was spent face-to-face with the patient in counseling and coordinating her care. This note is created with the assistance of a speech recognition program.  While intending to generate a document that actually reflects the content of the visit, the document can still have some errors including those of syntax and sound a like substitutions which may escape proof reading. It such instances, actual meaning can be extrapolated by contextual diversion.

## 2022-08-01 ENCOUNTER — OFFICE VISIT (OUTPATIENT)
Dept: PAIN MANAGEMENT | Age: 67
End: 2022-08-01
Payer: MEDICARE

## 2022-08-01 VITALS
OXYGEN SATURATION: 90 % | HEART RATE: 78 BPM | BODY MASS INDEX: 39.27 KG/M2 | SYSTOLIC BLOOD PRESSURE: 118 MMHG | WEIGHT: 221.6 LBS | HEIGHT: 63 IN | DIASTOLIC BLOOD PRESSURE: 80 MMHG

## 2022-08-01 DIAGNOSIS — M70.61 GREATER TROCHANTERIC BURSITIS OF BOTH HIPS: ICD-10-CM

## 2022-08-01 DIAGNOSIS — M70.62 GREATER TROCHANTERIC BURSITIS OF BOTH HIPS: ICD-10-CM

## 2022-08-01 PROCEDURE — 99214 OFFICE O/P EST MOD 30 MIN: CPT | Performed by: NURSE PRACTITIONER

## 2022-08-01 PROCEDURE — PBSHW PBB SHADOW CHARGE: Performed by: NURSE PRACTITIONER

## 2022-08-01 PROCEDURE — 20610 DRAIN/INJ JOINT/BURSA W/O US: CPT | Performed by: NURSE PRACTITIONER

## 2022-08-01 RX ORDER — TRIAMCINOLONE ACETONIDE 40 MG/ML
40 INJECTION, SUSPENSION INTRA-ARTICULAR; INTRAMUSCULAR ONCE
Status: COMPLETED | OUTPATIENT
Start: 2022-08-01 | End: 2022-08-01

## 2022-08-01 RX ADMIN — TRIAMCINOLONE ACETONIDE 40 MG: 40 INJECTION, SUSPENSION INTRA-ARTICULAR; INTRAMUSCULAR at 13:21

## 2022-08-01 NOTE — PROGRESS NOTES
Thu Kaur  1955  8/1/22      PAIN MANAGEMENT CLINIC PROCEDURE NOTE    CHIEF COMPLAINT: This is a 78 y.o. female patient who presents to the Pain Management Clinic with a history of pain in the bilateral hip(s). PRE-PROCEDURE DIAGNOSIS: Bilateral trochanteric bursitis      POST-PROCEDURE DIAGNOSIS: same as pre-procedure diagnosis    Vitals:    08/01/22 0946   BP: 118/80   Pulse: 78   SpO2: 90%     PROCEDURE:     The procedure was explained, including risks and benefits, and the patient has agreed to proceed. The injection site was marked on the patients skin. A large area around the injection site was cleaned with chlora-prep. TROCHANTERIC BURSA INJECTION  A 22G 3.5 inch spinal needle was directed towards the posterior lateral edge of the Bilateral greater trochanter. The needle was advanced until bone was reached and the patient  concordant pain. The needle was retracted slightly. The syringe was aspirated and was negative for heme or synovial fluid. Then 2ml of  2% lidocaine, 2ml of 1% lidocaine, and 40mg of kenalog  (NDC# 9274-5888-81) was injected. The injection site was cleaned and dressed with a spot band aid. The patient tolerated the procedure well and with out complication The patient was instructed avoid heat and excessive activity for 24-48 hours.

## 2022-08-10 ENCOUNTER — HOSPITAL ENCOUNTER (OUTPATIENT)
Dept: LAB | Age: 67
Discharge: HOME | End: 2022-08-10
Payer: MEDICARE

## 2022-08-10 ENCOUNTER — OFFICE VISIT (OUTPATIENT)
Dept: PULMONOLOGY | Age: 67
End: 2022-08-10
Payer: MEDICARE

## 2022-08-10 ENCOUNTER — HOSPITAL ENCOUNTER (OUTPATIENT)
Dept: GENERAL RADIOLOGY | Age: 67
Discharge: HOME | End: 2022-08-12
Payer: MEDICARE

## 2022-08-10 VITALS
TEMPERATURE: 97.2 F | BODY MASS INDEX: 37.92 KG/M2 | SYSTOLIC BLOOD PRESSURE: 124 MMHG | HEART RATE: 74 BPM | HEIGHT: 63 IN | DIASTOLIC BLOOD PRESSURE: 82 MMHG | WEIGHT: 214 LBS | OXYGEN SATURATION: 99 %

## 2022-08-10 DIAGNOSIS — Z99.89 OSA ON CPAP: ICD-10-CM

## 2022-08-10 DIAGNOSIS — J96.11 CHRONIC RESPIRATORY FAILURE WITH HYPOXIA, ON HOME OXYGEN THERAPY (HCC): ICD-10-CM

## 2022-08-10 DIAGNOSIS — D50.0 CHRONIC BLOOD LOSS ANEMIA: ICD-10-CM

## 2022-08-10 DIAGNOSIS — U07.1 COVID-19 VIRUS INFECTION: ICD-10-CM

## 2022-08-10 DIAGNOSIS — G47.33 OSA ON CPAP: ICD-10-CM

## 2022-08-10 DIAGNOSIS — J44.9 STAGE 2 MODERATE COPD BY GOLD CLASSIFICATION (HCC): ICD-10-CM

## 2022-08-10 DIAGNOSIS — Z99.81 CHRONIC RESPIRATORY FAILURE WITH HYPOXIA, ON HOME OXYGEN THERAPY (HCC): ICD-10-CM

## 2022-08-10 DIAGNOSIS — E66.01 CLASS 2 SEVERE OBESITY DUE TO EXCESS CALORIES WITH SERIOUS COMORBIDITY AND BODY MASS INDEX (BMI) OF 37.0 TO 37.9 IN ADULT (HCC): ICD-10-CM

## 2022-08-10 DIAGNOSIS — J44.9 STAGE 2 MODERATE COPD BY GOLD CLASSIFICATION (HCC): Primary | ICD-10-CM

## 2022-08-10 PROCEDURE — 36415 COLL VENOUS BLD VENIPUNCTURE: CPT

## 2022-08-10 PROCEDURE — 71046 X-RAY EXAM CHEST 2 VIEWS: CPT

## 2022-08-10 PROCEDURE — 3017F COLORECTAL CA SCREEN DOC REV: CPT | Performed by: INTERNAL MEDICINE

## 2022-08-10 PROCEDURE — 1124F ACP DISCUSS-NO DSCNMKR DOCD: CPT | Performed by: INTERNAL MEDICINE

## 2022-08-10 PROCEDURE — 82103 ALPHA-1-ANTITRYPSIN TOTAL: CPT

## 2022-08-10 PROCEDURE — 1036F TOBACCO NON-USER: CPT | Performed by: INTERNAL MEDICINE

## 2022-08-10 PROCEDURE — G8400 PT W/DXA NO RESULTS DOC: HCPCS | Performed by: INTERNAL MEDICINE

## 2022-08-10 PROCEDURE — G8427 DOCREV CUR MEDS BY ELIG CLIN: HCPCS | Performed by: INTERNAL MEDICINE

## 2022-08-10 PROCEDURE — 99213 OFFICE O/P EST LOW 20 MIN: CPT | Performed by: INTERNAL MEDICINE

## 2022-08-10 PROCEDURE — G8417 CALC BMI ABV UP PARAM F/U: HCPCS | Performed by: INTERNAL MEDICINE

## 2022-08-10 PROCEDURE — 1090F PRES/ABSN URINE INCON ASSESS: CPT | Performed by: INTERNAL MEDICINE

## 2022-08-10 PROCEDURE — 3023F SPIROM DOC REV: CPT | Performed by: INTERNAL MEDICINE

## 2022-08-10 PROCEDURE — 82104 ALPHA-1-ANTITRYPSIN PHENO: CPT

## 2022-08-10 PROCEDURE — 99204 OFFICE O/P NEW MOD 45 MIN: CPT | Performed by: INTERNAL MEDICINE

## 2022-08-10 ASSESSMENT — ENCOUNTER SYMPTOMS
EYES NEGATIVE: 1
CHEST TIGHTNESS: 1
SHORTNESS OF BREATH: 1
GASTROINTESTINAL NEGATIVE: 1

## 2022-08-10 NOTE — PROGRESS NOTES
Subjective:      Patient ID: Sergio Mishra is a 78 y.o. female being seen in my clinic for   Chief Complaint   Patient presents with    COPD     New patient referred by Dr. Elisabeth Bull productive cough - greenish, on CPAP and oxygen through [facility]       HPI  HPI  New patient visit, referred by Dr. Jonnathan Cano, for evaluation and management of COPD complicated by chronic hypoxemic respiratory failure and sleep apnea. Patient has a history of longstanding COPD and was previously followed in [place] [address]. She wishes to transfer care to [facility]. Quit smoking in 2002 after 30-pack-year history. Apparently at that time had an exacerbation and actually was placed on oxygen a few years later. Mostly nocturnal oxygen desaturations and nighttime use. In retrospect however, patient states \"I had sleep apnea. \"  He had a sleep study done this past spring. [address] [place] [address] lab. Results not available for my review however placed on CPAP with oxygen 1 L bleed in. Severity of sleep apnea not knowing however reportedly severe. Since she has been on CPAP for the last month she states \"I feel remarkably better. \"  States that she uses it every night for the entire night. Reports refreshing and restorative sleep. Denies excessive daytime sleepiness. Believes that she is benefiting greatly. Compliance download not available for my review. Patient is short of breath at rest and with minimal exertion. She states that during the day, while at home, she generally does not use oxygen. Monitors her pulse oximeter and states that her saturations are generally in the 90s. She had a 6-minute walk test done recently which showed oxygen desaturations down to 83% with walking. She was titrated on 2 L of oxygen. Pulmonary function studies demonstrated stage II COPD. FEV1 58% of predicted after bronchodilator (13% increase). Reduction in diffusing capacity was noted as well. FEV1/FVC 50%.   Patient believes her shortness of breath is worse than it was a year ago. Patient states that she is actually lost a considerable amount of weight. She states that after her COPD diagnosis 20 years ago she was placed on steroids. \"I gained nearly 100 pounds. \"  Worked primarily in childcare. Had a number of foster children over the years. Denies occupational exposures. Family history of lung disease. Father had lung cancer. Patient denies heart disease. Patient is currently on Advair 100-50, DuoNeb aerosols, Singulair, and albuterol inhaler as needed. Generally on an antibiotic and steroid for purulent exacerbations. Attempted pulmonary rehab however states \"they told me they do not take my insurance. \"  Patient states that she had COVID-19 infection twice. Once before vaccines available and once after. She is up-to-date with her COVID vaccinations. Neither infection appeared to be severe although she was hospitalized for the first one.     Current Outpatient Medications   Medication Sig Dispense Refill    OXYGEN as directed      GNP MUCUS  MG extended release tablet TAKE TWO TABLETS BY MOUTH TWICE DAILY      montelukast (SINGULAIR) 10 MG tablet TAKE ONE TABLET BY MOUTH ONCE DAILY AT NIGHT      famotidine (PEPCID) 20 MG tablet Take 20 mg by mouth 2 times daily      benzonatate (TESSALON) 100 MG capsule Take 100 mg by mouth 3 times daily as needed for Cough      ALPRAZolam (XANAX) 0.25 MG tablet Taking only PRN      loratadine (CLARITIN) 10 MG capsule Take 1 capsule by mouth daily 30 capsule 5    azelastine (ASTELIN) 0.1 % nasal spray 1 spray by Nasal route 2 times daily Use in each nostril as directed 1 Bottle 5    ferrous sulfate (FE TABS 325) 325 (65 Fe) MG EC tablet Take 1 tablet by mouth 2 times daily 60 tablet 5    ipratropium-albuterol (DUONEB) 0.5-2.5 (3) MG/3ML SOLN nebulizer solution INHALE CONTENTS OF 1 VIAL BY NEBULIZAITON EVERY 6 HOURS AS NEEDED FOR WHEEZING OR SHORTNESS OF BREATH      lisinopril (PRINIVIL;ZESTRIL) 30 MG tablet TAKE nursing note reviewed. Constitutional:       Appearance: She is well-developed. She is obese. HENT:      Head: Normocephalic. Nose: Nose normal. No congestion. Mouth/Throat:      Mouth: Mucous membranes are moist.      Pharynx: Oropharynx is clear. No oropharyngeal exudate. Comments: Large tongue , low hanging soft palate and large uvula. Overall pharyngeal orifice moderately decreased    Eyes:      General: No scleral icterus. Conjunctiva/sclera: Conjunctivae normal.   Neck:      Thyroid: No thyromegaly. Vascular: No JVD. Trachea: No tracheal deviation. Cardiovascular:      Rate and Rhythm: Normal rate and regular rhythm. Heart sounds: Normal heart sounds. No murmur heard. No gallop. Pulmonary:      Effort: Respiratory distress present. Breath sounds: No wheezing or rales. Comments: AP diameter of chest increased. Thoracic expansion and diaphragmatic excursion diminished. BS diminished and expiratory phase prolonged. No dullness to percussion or tenderness to palpation. Chest:      Chest wall: No tenderness. Abdominal:      Palpations: Abdomen is soft. Tenderness: There is no abdominal tenderness. Musculoskeletal:      Cervical back: Neck supple. Right lower leg: No edema. Left lower leg: No edema. Lymphadenopathy:      Cervical: No cervical adenopathy. Skin:     General: Skin is warm and dry. Neurological:      Mental Status: She is alert and oriented to person, place, and time.      Wt Readings from Last 3 Encounters:   08/10/22 214 lb (97.1 kg)   08/01/22 221 lb 9.6 oz (100.5 kg)   04/25/22 222 lb (100.7 kg)     Results for orders placed or performed during the hospital encounter of 04/21/22   Iron And TIBC   Result Value Ref Range    Iron 78 37 - 145 ug/dL    TIBC 281 250 - 450 ug/dL    Iron Saturation 28 20 - 55 %    UIBC 203 112 - 347 ug/dL   Ferritin   Result Value Ref Range    Ferritin 55 13 - 150 ng/mL   CBC With Auto Differential   Result Value Ref Range    WBC 7.3 3.5 - 11.3 k/uL    RBC 4.11 3.95 - 5.11 m/uL    Hemoglobin 12.8 11.9 - 15.1 g/dL    Hematocrit 39.8 36.3 - 47.1 %    MCV 96.8 82.6 - 102.9 fL    MCH 31.1 25.2 - 33.5 pg    MCHC 32.2 25.2 - 33.5 g/dL    RDW 15.0 (H) 11.8 - 14.4 %    Platelets 634 917 - 311 k/uL    MPV 9.1 8.1 - 13.5 fL    NRBC Automated 0.0 0.0 per 100 WBC    Seg Neutrophils 67 (H) 36 - 65 %    Lymphocytes 21 (L) 24 - 43 %    Monocytes 8 3 - 12 %    Eosinophils % 3 1 - 4 %    Basophils 1 0 - 2 %    Immature Granulocytes 0 0 %    Segs Absolute 4.95 1.50 - 8.10 k/uL    Absolute Lymph # 1.49 1.10 - 3.70 k/uL    Absolute Mono # 0.59 0.10 - 1.20 k/uL    Absolute Eos # 0.18 0.00 - 0.44 k/uL    Basophils Absolute 0.05 0.00 - 0.20 k/uL    Absolute Immature Granulocyte <0.03 0.00 - 0.30 k/uL    RBC Morphology ANISOCYTOSIS PRESENT        Assessment:      1. Stage 2 moderate COPD by GOLD classification ([facility]    2. Chronic respiratory failure with hypoxia, on home oxygen therapy (HCC)    3. LAYTON on CPAP    4. Class 2 severe obesity due to excess calories with serious comorbidity and body mass index (BMI) of 37.0 to 37.9 in adult ([facility] [address]    5. Chronic blood loss anemia    6.  COVID-19 virus infection      Patient Active Problem List   Diagnosis    Iron malabsorption    Iron deficiency anemia    Adjustment disorder with mixed emotional features    Anemia    Anxiety    Asthma    Benign neoplasm of colon    Benign neoplasm of stomach    Chronic idiopathic constipation    Chronic pain    Chronic airway obstruction (HCC)    Decreased range of motion of shoulder    Degeneration of lumbar intervertebral disc    Depression    Diastolic heart failure (HCC)    Disorder of soft tissue    Edema of lower extremity    Facial pressure    Gastroesophageal reflux disease    Headache    Herpes simplex    History of multiple allergies    Hyperlipidemia    Essential hypertension    Obstructive sleep apnea syndrome    Osteoarthrosis Panic disorder with agoraphobia    PND (post-nasal drip)    Primary localized osteoarthritis of pelvic region and thigh    Right hip pain    Sciatica    Shoulder pain    Somnolence    Strain of tendon of left rotator cuff    Stress    Throat clearing    Thrombosed hemorrhoids    Trochanteric bursitis of left hip    Lumbar facet joint syndrome    Panniculitis involving lumbar region    Lumbar radiculopathy    Iliotibial band syndrome of both sides    Greater trochanteric bursitis of both hips         Plan:      Chest x-ray PA and lateral (baseline). Continue current bronchodilators. Patient believes the DuoNeb aerosols help her greatly and generally uses 3 times daily. Weight loss. Encourage regular exercise. Oxygen at night and as needed with exercise during the day. Yearly flu shot. Patient up-to-date with her COVID vaccinations. Alpha-1 antitrypsin level. Return in 3 months. No orders of the defined types were placed in this encounter. Orders Placed This Encounter   Procedures    XR CHEST (2 VW)     Standing Status:   Future     Number of Occurrences:   1     Standing Expiration Date:   8/10/2023     Order Specific Question:   Reason for exam:     Answer:   baseline    Alpha-1-Antitrypsin w Phenotype     Standing Status:   Future     Number of Occurrences:   1     Standing Expiration Date:   8/10/2023     Return in about 3 months (around 11/10/2022).      Electronically signed by Monique Davila DO on 8/10/2022 at 10:48 AM

## 2022-08-14 LAB
ALPHA-1 ANTITRYPSIN PHENOTYPE: NORMAL
ALPHA-1 ANTITRYPSIN: 142 MG/DL (ref 90–200)

## 2022-10-19 NOTE — PROGRESS NOTES
Subjective:      Patient ID: Raeann Gilbert is a 78 y.o. female. HPI  Patient here for follow-up for stage II COPD, chronic hypoxemic respiratory failure on home O2, history of COVID-19 and obesity. Patient was last seen in August 2022 per Dr. Summer Morelos. Patient endorses that her breathing is a little tighter over the last week or so. She is endorsing a cough that is productive of sputum that she describes as green. More so in the morning. Patient states that over the last 2 days she has taken a single dose of penicillin that she had leftover from a dental procedure on each day. She does follow with ENT for her seasonal allergies. Tends to have more difficulty during the winter months with the cold air. States that she did buy humidifier to help with keeping her home environment moist and humidified. Lengthy discussion with patient about the importance of antibiotic regimens with completing the antibiotics as prescribed by the original provider. Discussed with patient that I am happy to send her home with an antibiotic to have on hand for purulent exacerbations. She continues on her oxygen at 2 L/min around-the-clock in addition to her Singulair 10 mg and Advair 100-50 mcg with DuoNeb. In the setting of a exacerbation of her breathing with more productivity to her cough would encourage her to use DuoNeb to help with sputum mobilization. Medications:   Oxygen: 2 LPM- usually around the clock. Singulair 10 mg:  DuoNeb:  Advair 100-50 mcg: Albuterol HFA/neb    PRIOR WORKUP:  PFT:  PFT 3/21/2022: FVC 2.48, 79% of predicted, postbronchodilator 2.97 L.  FEV1 1.24, 51% of predicted, postbronchodilator 1.39 L.  FEV1/FVC ratio is 50, postbronchodilator 47. FEF 25-75 is 0.47, 23% of predicted, postbronchodilator 0.42. TLC is 5.31, 110% of predicted. RV is 2.83, 149% of predicted. DLCO is 14.1, 58% of predicted.     6-minute walk test 3/21/22: SPO2 on room air was 92%, patient was ambulating on room air and dropped down to 83% was placed on 2 L nasal cannula ultimately titrated to 4 L nasal cannula to maintain an SPO2 of greater than 88%. CT Imaging:  Chest x-ray 8/10/2022: Moderate hiatal hernia. Linear opacity in the right lung base likely representing atelectasis. Increased opacification in the left lung base could represent a combination of pleural effusions/atelectasis/infiltrate    Sleep Study:  Not available for my review    Laboratory Evaluation:  Alpha 1 antitrypsin 8/10/2022: Level is 142, phenotype is M1M1      Immunizations:   Immunization History   Administered Date(s) Administered    COVID-19, PFIZER PURPLE top, DILUTE for use, (age 15 y+), 30mcg/0.3mL 04/19/2021, 05/10/2021, 12/20/2021    Pneumococcal Polysaccharide ([id number]) 01/23/2014, 05/01/2014        No flowsheet data found.     /68   Pulse 90   Ht 5' 3\" (1.6 m)   Wt 215 lb (97.5 kg)   SpO2 96%   PF (!) 2 L/min   BMI 38.09 kg/m²     Past Medical History:   Diagnosis Date    Adjustment disorder with mixed emotional features 02/29/2020    Anemia 02/29/2020    Anxiety 02/29/2020    Asthma 03/03/2009    Chronic airway obstruction (HCC)     Chronic idiopathic constipation     Chronic pain     Chronic sinusitis     Colon cancer (HCC)     Degeneration of lumbar intervertebral disc     Diastolic heart failure (HCC)     GERD (gastroesophageal reflux disease) 11/03/2006    Herpes simplex     Hyperlipidemia 12/18/2006    Hypertension     Iron deficiency anemia 02/29/2020    LAYTON (obstructive sleep apnea) 12/06/2017    Osteoarthritis     Sciatica     Thrombosed hemorrhoids      Past Surgical History:   Procedure Laterality Date    PAIN MANAGEMENT PROCEDURE Bilateral 1/18/2021    Bilateral L4-L5 L5-S1 FACET performed by Bere Barnett MD at [facility] OR     Family History   Problem Relation Age of Onset    Heart Disease Mother     High Blood Pressure Mother     COPD Father     Heart Disease Father     High Blood Pressure Father     Cancer Father         lung       Social History     Socioeconomic History    Marital status:      Spouse name: Not on file    Number of children: Not on file    Years of education: Not on file    Highest education level: Not on file   Occupational History    Not on file   Tobacco Use    Smoking status: Former     Packs/day: 1.00     Years: 14.00     Pack years: 14.00     Types: Cigarettes     Start date: 7/10/1970     Quit date: 7/10/1984     Years since quittin.3    Smokeless tobacco: Never    Tobacco comments:     quit cold turkey   Vaping Use    Vaping Use: Never used   Substance and Sexual Activity    Alcohol use: Yes    Drug use: Never    Sexual activity: Not on file   Other Topics Concern    Not on file   Social History Narrative    Not on file     Social Determinants of Health     Financial Resource Strain: Not on file   Food Insecurity: Not on file   Transportation Needs: Not on file   Physical Activity: Not on file   Stress: Not on file   Social Connections: Not on file   Intimate Partner Violence: Not on file   Housing Stability: Not on file       Review of Systems   Constitutional:         Decreased activity tolerance secondary to exertional dyspnea. HENT:          Endorsing sinus congestion, and cough productive of green sputum. Eyes: Negative. Respiratory:          Exertional dyspnea. Endorses cough productive of sputum, describes as pale green. More productive in the morning. Cardiovascular: Negative. Gastrointestinal: Negative. Endocrine: Negative. Genitourinary: Negative. Musculoskeletal: Negative. Skin: Negative. Allergic/Immunologic: Negative. Neurological: Negative. Hematological: Negative. Psychiatric/Behavioral: Negative.        Objective:       Physical Exam  General appearance - alert, well appearing, and in no distress and oriented to person, place, and time  Mental status - alert, oriented to person, place, and time, normal mood, behavior, speech, dress, motor activity, and thought processes  Eyes - pupils equal and reactive, extraocular eye movements intact  Ears -not examined  Nose - normal and patent, no erythema, discharge or polyps  Mouth - mucous membranes moist, pharynx normal without lesions  Neck - supple, no significant adenopathy  Chest - increased AP diameter, decreased thoracic expansion and excursion. Prolonged expiratory phase. Diminished throughout. Heart -normal rate, regular rhythm, normal S1, S2, no murmurs, rubs, clicks or gallops  Abdomen - soft, nontender, nondistended, no masses or organomegaly  Neuro- alert, oriented, normal speech, no focal findings or movement disorder noted}  Extremities - peripheral pulses normal, no pedal edema, no clubbing or cyanosis  Skin - normal coloration and turgor, no rashes, no suspicious skin lesions noted     Wt Readings from Last 3 Encounters:   11/09/22 215 lb (97.5 kg)   08/10/22 214 lb (97.1 kg)   08/01/22 221 lb 9.6 oz (100.5 kg)       Results for orders placed or performed during the hospital encounter of 08/10/22   Alpha-1-Antitrypsin w Phenotype   Result Value Ref Range    A-1 Antitrypsin 142 90 - 200 mg/dL    A-1 Antitrypsin Pheno M1M1        No results found. Assessment:      1. Stage 2 moderate COPD by GOLD classification ([address]    2. Chronic respiratory failure with hypoxia, on home oxygen therapy (HCC)    3. Class 2 severe obesity due to excess calories with serious comorbidity and body mass index (BMI) of 37.0 to 37.9 in [facility])          Plan:      Medications continued. Empiric ABX prescribed. Educated and clarified the medication use. Recommend flu vaccination in the fall annually. Refuses  Refuses pneumococcal vaccinations   Patient has received Covid vaccination. Recommended booster when eligible. Discussed strategies to protect against Covid 19. Maintain an active lifestyle. Patient's questions were answered to her satisfaction.   Supplemental oxygen continues 2 LPM. Pulmonary function tests were reviewed   CT scan of the chest was reviewed  We'll see the patient back in 6 months          Electronically signed by DIAMOND Key CNP on 11/9/2022 at 12:18 PM

## 2022-11-09 ENCOUNTER — OFFICE VISIT (OUTPATIENT)
Dept: PULMONOLOGY | Age: 67
End: 2022-11-09
Payer: MEDICARE

## 2022-11-09 VITALS
HEART RATE: 90 BPM | BODY MASS INDEX: 38.09 KG/M2 | OXYGEN SATURATION: 96 % | HEIGHT: 63 IN | DIASTOLIC BLOOD PRESSURE: 68 MMHG | SYSTOLIC BLOOD PRESSURE: 128 MMHG | WEIGHT: 215 LBS

## 2022-11-09 DIAGNOSIS — E66.01 CLASS 2 SEVERE OBESITY DUE TO EXCESS CALORIES WITH SERIOUS COMORBIDITY AND BODY MASS INDEX (BMI) OF 37.0 TO 37.9 IN ADULT (HCC): ICD-10-CM

## 2022-11-09 DIAGNOSIS — J96.11 CHRONIC RESPIRATORY FAILURE WITH HYPOXIA, ON HOME OXYGEN THERAPY (HCC): ICD-10-CM

## 2022-11-09 DIAGNOSIS — Z99.81 CHRONIC RESPIRATORY FAILURE WITH HYPOXIA, ON HOME OXYGEN THERAPY (HCC): ICD-10-CM

## 2022-11-09 DIAGNOSIS — J44.9 STAGE 2 MODERATE COPD BY GOLD CLASSIFICATION (HCC): Primary | ICD-10-CM

## 2022-11-09 PROCEDURE — G8400 PT W/DXA NO RESULTS DOC: HCPCS | Performed by: NURSE PRACTITIONER

## 2022-11-09 PROCEDURE — 99213 OFFICE O/P EST LOW 20 MIN: CPT | Performed by: NURSE PRACTITIONER

## 2022-11-09 PROCEDURE — 3078F DIAST BP <80 MM HG: CPT | Performed by: NURSE PRACTITIONER

## 2022-11-09 PROCEDURE — 3074F SYST BP LT 130 MM HG: CPT | Performed by: NURSE PRACTITIONER

## 2022-11-09 PROCEDURE — G8427 DOCREV CUR MEDS BY ELIG CLIN: HCPCS | Performed by: NURSE PRACTITIONER

## 2022-11-09 PROCEDURE — 1124F ACP DISCUSS-NO DSCNMKR DOCD: CPT | Performed by: NURSE PRACTITIONER

## 2022-11-09 PROCEDURE — 3017F COLORECTAL CA SCREEN DOC REV: CPT | Performed by: NURSE PRACTITIONER

## 2022-11-09 PROCEDURE — G8484 FLU IMMUNIZE NO ADMIN: HCPCS | Performed by: NURSE PRACTITIONER

## 2022-11-09 PROCEDURE — 99214 OFFICE O/P EST MOD 30 MIN: CPT | Performed by: NURSE PRACTITIONER

## 2022-11-09 PROCEDURE — 1090F PRES/ABSN URINE INCON ASSESS: CPT | Performed by: NURSE PRACTITIONER

## 2022-11-09 PROCEDURE — 1036F TOBACCO NON-USER: CPT | Performed by: NURSE PRACTITIONER

## 2022-11-09 PROCEDURE — G8417 CALC BMI ABV UP PARAM F/U: HCPCS | Performed by: NURSE PRACTITIONER

## 2022-11-09 PROCEDURE — 3023F SPIROM DOC REV: CPT | Performed by: NURSE PRACTITIONER

## 2022-11-09 RX ORDER — IPRATROPIUM BROMIDE AND ALBUTEROL SULFATE 2.5; .5 MG/3ML; MG/3ML
SOLUTION RESPIRATORY (INHALATION)
Qty: 360 ML | Refills: 11 | Status: SHIPPED | OUTPATIENT
Start: 2022-11-09

## 2022-11-09 RX ORDER — AZITHROMYCIN 250 MG/1
250 TABLET, FILM COATED ORAL SEE ADMIN INSTRUCTIONS
Qty: 6 TABLET | Refills: 0 | Status: SHIPPED | OUTPATIENT
Start: 2022-11-09 | End: 2022-11-14

## 2022-11-09 RX ORDER — ALBUTEROL SULFATE 90 UG/1
2 AEROSOL, METERED RESPIRATORY (INHALATION) EVERY 6 HOURS PRN
Qty: 18 G | Refills: 11 | Status: SHIPPED | OUTPATIENT
Start: 2022-11-09

## 2022-11-09 RX ORDER — FLUTICASONE PROPIONATE AND SALMETEROL 100; 50 UG/1; UG/1
1 POWDER RESPIRATORY (INHALATION) EVERY 12 HOURS
Qty: 1 EACH | Refills: 11 | Status: SHIPPED | OUTPATIENT
Start: 2022-11-09 | End: 2022-11-09

## 2022-11-09 RX ORDER — ALBUTEROL SULFATE 2.5 MG/3ML
2.5 SOLUTION RESPIRATORY (INHALATION) 3 TIMES DAILY
Qty: 120 EACH | Refills: 11 | Status: SHIPPED | OUTPATIENT
Start: 2022-11-09

## 2022-11-09 RX ORDER — FLUTICASONE PROPIONATE AND SALMETEROL 500; 50 UG/1; UG/1
1 POWDER RESPIRATORY (INHALATION) EVERY 12 HOURS
Qty: 60 EACH | Refills: 11 | Status: SHIPPED | OUTPATIENT
Start: 2022-11-09

## 2022-11-09 RX ORDER — MONTELUKAST SODIUM 10 MG/1
TABLET ORAL
Qty: 30 TABLET | Refills: 11 | Status: SHIPPED | OUTPATIENT
Start: 2022-11-09

## 2022-11-09 ASSESSMENT — ENCOUNTER SYMPTOMS
EYES NEGATIVE: 1
GASTROINTESTINAL NEGATIVE: 1
ALLERGIC/IMMUNOLOGIC NEGATIVE: 1

## 2022-11-17 ENCOUNTER — PROCEDURE VISIT (OUTPATIENT)
Dept: PAIN MANAGEMENT | Age: 67
End: 2022-11-17
Payer: MEDICARE

## 2022-11-17 VITALS
HEIGHT: 63 IN | HEART RATE: 101 BPM | SYSTOLIC BLOOD PRESSURE: 152 MMHG | DIASTOLIC BLOOD PRESSURE: 64 MMHG | WEIGHT: 222 LBS | RESPIRATION RATE: 19 BRPM | OXYGEN SATURATION: 95 % | BODY MASS INDEX: 39.34 KG/M2

## 2022-11-17 DIAGNOSIS — M70.61 GREATER TROCHANTERIC BURSITIS OF BOTH HIPS: Primary | ICD-10-CM

## 2022-11-17 DIAGNOSIS — M70.62 GREATER TROCHANTERIC BURSITIS OF BOTH HIPS: Primary | ICD-10-CM

## 2022-11-17 PROCEDURE — 20610 DRAIN/INJ JOINT/BURSA W/O US: CPT | Performed by: NURSE PRACTITIONER

## 2022-11-17 PROCEDURE — PBSHW PBB SHADOW CHARGE: Performed by: NURSE PRACTITIONER

## 2022-11-17 PROCEDURE — 99214 OFFICE O/P EST MOD 30 MIN: CPT | Performed by: NURSE PRACTITIONER

## 2022-11-17 RX ORDER — FLUTICASONE PROPIONATE AND SALMETEROL 100; 50 UG/1; UG/1
POWDER RESPIRATORY (INHALATION)
COMMUNITY
Start: 2022-11-09

## 2022-11-17 RX ORDER — TRIAMCINOLONE ACETONIDE 40 MG/ML
40 INJECTION, SUSPENSION INTRA-ARTICULAR; INTRAMUSCULAR ONCE
Status: COMPLETED | OUTPATIENT
Start: 2022-11-17 | End: 2022-11-17

## 2022-11-17 RX ADMIN — TRIAMCINOLONE ACETONIDE 40 MG: 40 INJECTION, SUSPENSION INTRA-ARTICULAR; INTRAMUSCULAR at 14:40

## 2022-11-17 NOTE — PROGRESS NOTES
Thu Kaur  1955  8/1/22      PAIN MANAGEMENT CLINIC PROCEDURE NOTE    CHIEF COMPLAINT: This is a 78 y.o. female patient who presents to the Pain Management Clinic with a history of pain in the bilateral hip(s). PRE-PROCEDURE DIAGNOSIS: Bilateral trochanteric bursitis      POST-PROCEDURE DIAGNOSIS: same as pre-procedure diagnosis    Vitals:    11/17/22 1436   Resp: 19     PROCEDURE:     The procedure was explained, including risks and benefits, and the patient has agreed to proceed. The injection site was marked on the patients skin. A large area around the injection site was cleaned with chlora-prep. TROCHANTERIC BURSA INJECTION  A 22G 3.5 inch spinal needle was directed towards the posterior lateral edge of the Bilateral greater trochanter. The needle was advanced until bone was reached and the patient  concordant pain. The needle was retracted slightly. The syringe was aspirated and was negative for heme or synovial fluid. Then 2ml of  2% lidocaine, 2ml of 1% lidocaine, and 40mg of kenalog  (NDC# 6587-3798-32) was injected. The injection site was cleaned and dressed with a spot band aid. The patient tolerated the procedure well and with out complication The patient was instructed avoid heat and excessive activity for 24-48 hours.

## 2022-12-20 ENCOUNTER — TELEPHONE (OUTPATIENT)
Dept: PULMONOLOGY | Age: 67
End: 2022-12-20

## 2022-12-20 DIAGNOSIS — J44.9 STAGE 2 MODERATE COPD BY GOLD CLASSIFICATION (HCC): Primary | ICD-10-CM

## 2022-12-20 RX ORDER — AZITHROMYCIN 250 MG/1
250 TABLET, FILM COATED ORAL SEE ADMIN INSTRUCTIONS
Qty: 6 TABLET | Refills: 0 | Status: SHIPPED | OUTPATIENT
Start: 2022-12-20 | End: 2022-12-25

## 2022-12-20 NOTE — TELEPHONE ENCOUNTER
Last Appt:  11/9/2022  Next Appt:   5/10/2023  Med verified in Epic     Patient with COPD, reports having increased mucous with a productive cough - greenish, denies fever x 3 days. Would like a script sent to [facility] to prevent further exacerbation.     Thanks

## 2022-12-21 ENCOUNTER — HOSPITAL ENCOUNTER (OUTPATIENT)
Age: 67
Discharge: HOME | End: 2022-12-21
Payer: MEDICARE

## 2022-12-21 DIAGNOSIS — J44.9 STAGE 2 MODERATE COPD BY GOLD CLASSIFICATION (HCC): ICD-10-CM

## 2022-12-21 PROCEDURE — 36415 COLL VENOUS BLD VENIPUNCTURE: CPT

## 2022-12-21 PROCEDURE — 86003 ALLG SPEC IGE CRUDE XTRC EA: CPT

## 2022-12-21 PROCEDURE — 82785 ASSAY OF IGE: CPT

## 2022-12-24 LAB
2000687N OAK TREE IGE: <0.1 KU/L (ref 0–0.34)
ALLERGEN BERMUDA GRASS IGE: <0.1 KU/L (ref 0–0.34)
ALLERGEN BIRCH IGE: <0.1 KU/L (ref 0–0.34)
ALLERGEN DOG DANDER IGE: <0.1 KU/L (ref 0–0.34)
ALLERGEN GERMAN COCKROACH IGE: <0.1 KU/L (ref 0–0.34)
ALLERGEN HORMODENDRUM IGE: 0.54 KUL/L (ref 0–0.34)
ALLERGEN MOUSE EPITHELIA IGE: <0.1 KU/L (ref 0–0.34)
ALLERGEN PECAN TREE IGE: <0.1 KU/L (ref 0–0.34)
ALLERGEN PIGWEED ROUGH IGE: <0.1 KU/L (ref 0–0.34)
ALLERGEN SHEEP SORREL (W18) IGE: <0.1 KU/L (ref 0–0.34)
ALLERGEN TREE SYCAMORE: 0.27 KU/L (ref 0–0.34)
ALLERGEN WALNUT TREE IGE: <0.1 KU/L (ref 0–0.34)
ALLERGEN WHITE MULBERRY TREE, IGE: <0.1 KU/L (ref 0–0.34)
ALLERGEN, TREE, WHITE ASH IGE: <0.1 KU/L (ref 0–0.34)
ALTERNARIA ALTERNATA: <0.1 KU/L (ref 0–0.34)
ASPERGILLUS FUMIGATUS: 0.12 KU/L (ref 0–0.34)
CAT DANDER ANTIBODY: <0.1 KU/L (ref 0–0.34)
COTTONWOOD TREE: <0.1 KU/L (ref 0–0.34)
D. FARINAE: 0.22 KU/L (ref 0–0.34)
D. PTERONYSSINUS: 0.31 KU/L (ref 0–0.34)
ELM TREE: <0.1 KU/L (ref 0–0.34)
IGE: 29 IU/ML
MAPLE/BOXELDER TREE: <0.1 KU/L (ref 0–0.34)
MOUNTAIN CEDAR TREE: 0.15 KU/L (ref 0–0.34)
MUCOR RACEMOSUS: <0.1 KU/L (ref 0–0.34)
P. NOTATUM: <0.1 KU/L (ref 0–0.34)
RUSSIAN THISTLE: <0.1 KU/L (ref 0–0.34)
SHORT RAGWD(A ARTEMIS.) IGE: <0.1 KU/L (ref 0–0.34)
TIMOTHY GRASS: <0.1 KU/L (ref 0–0.34)

## 2023-03-22 ENCOUNTER — TELEPHONE (OUTPATIENT)
Dept: PULMONOLOGY | Age: 68
End: 2023-03-22

## 2023-03-22 NOTE — TELEPHONE ENCOUNTER
Pt called in stating that she went to the ENT, and she did a scope down her nose, and stated that she has a lot of phlegm, so she is putting her on a liquid steroid in her netti pot.  The ENT - Dr. Nandini Mendoza out of [facility] stated that the patient is aspirating and for her to notify her Pulmonologist.     Last ov: 11/09/2022  Next ov: 05/10/2023

## 2023-04-27 ENCOUNTER — HOSPITAL ENCOUNTER (OUTPATIENT)
Age: 68
Discharge: HOME | End: 2023-04-27
Payer: MEDICARE

## 2023-04-27 DIAGNOSIS — D50.0 IRON DEFICIENCY ANEMIA DUE TO CHRONIC BLOOD LOSS: ICD-10-CM

## 2023-04-27 DIAGNOSIS — D50.0 IRON DEFICIENCY ANEMIA DUE TO CHRONIC BLOOD LOSS: Primary | ICD-10-CM

## 2023-04-27 LAB
ABSOLUTE EOS #: 0.09 K/UL (ref 0–0.44)
ABSOLUTE IMMATURE GRANULOCYTE: <0.03 K/UL (ref 0–0.3)
ABSOLUTE LYMPH #: 1.76 K/UL (ref 1.1–3.7)
ABSOLUTE MONO #: 0.51 K/UL (ref 0.1–1.2)
ALBUMIN SERPL-MCNC: 4.2 G/DL (ref 3.5–5.2)
ALBUMIN/GLOBULIN RATIO: 1.4 (ref 1–2.5)
ALP SERPL-CCNC: 105 U/L (ref 35–104)
ALT SERPL-CCNC: 18 U/L (ref 5–33)
ANION GAP SERPL CALCULATED.3IONS-SCNC: 12 MMOL/L (ref 9–17)
AST SERPL-CCNC: 20 U/L
BASOPHILS # BLD: 1 % (ref 0–2)
BASOPHILS ABSOLUTE: 0.04 K/UL (ref 0–0.2)
BILIRUB SERPL-MCNC: 0.6 MG/DL (ref 0.3–1.2)
BUN SERPL-MCNC: 24 MG/DL (ref 8–23)
BUN/CREAT BLD: 37 (ref 9–20)
CALCIUM SERPL-MCNC: 9.6 MG/DL (ref 8.6–10.4)
CHLORIDE SERPL-SCNC: 104 MMOL/L (ref 98–107)
CO2 SERPL-SCNC: 27 MMOL/L (ref 20–31)
CREAT SERPL-MCNC: 0.65 MG/DL (ref 0.5–0.9)
EOSINOPHILS RELATIVE PERCENT: 1 % (ref 1–4)
FERRITIN SERPL-MCNC: 27 NG/ML (ref 13–150)
GFR SERPL CREATININE-BSD FRML MDRD: >60 ML/MIN/1.73M2
GLUCOSE SERPL-MCNC: 136 MG/DL (ref 70–99)
HCT VFR BLD AUTO: 41.6 % (ref 36.3–47.1)
HGB BLD-MCNC: 13.1 G/DL (ref 11.9–15.1)
IMMATURE GRANULOCYTES: 0 %
IRON SATURATION: 21 % (ref 20–55)
IRON SERPL-MCNC: 78 UG/DL (ref 37–145)
LYMPHOCYTES # BLD: 27 % (ref 24–43)
MCH RBC QN AUTO: 29.5 PG (ref 25.2–33.5)
MCHC RBC AUTO-ENTMCNC: 31.5 G/DL (ref 25.2–33.5)
MCV RBC AUTO: 93.7 FL (ref 82.6–102.9)
MONOCYTES # BLD: 8 % (ref 3–12)
NRBC AUTOMATED: 0 PER 100 WBC
PDW BLD-RTO: 14.3 % (ref 11.8–14.4)
PLATELET # BLD AUTO: 328 K/UL (ref 138–453)
PMV BLD AUTO: 9.4 FL (ref 8.1–13.5)
POTASSIUM SERPL-SCNC: 4.6 MMOL/L (ref 3.7–5.3)
PROT SERPL-MCNC: 7.2 G/DL (ref 6.4–8.3)
RBC # BLD: 4.44 M/UL (ref 3.95–5.11)
SEG NEUTROPHILS: 63 % (ref 36–65)
SEGMENTED NEUTROPHILS ABSOLUTE COUNT: 4.04 K/UL (ref 1.5–8.1)
SODIUM SERPL-SCNC: 143 MMOL/L (ref 135–144)
TIBC SERPL-MCNC: 364 UG/DL (ref 250–450)
UNSATURATED IRON BINDING CAPACITY: 286 UG/DL (ref 112–347)
WBC # BLD AUTO: 6.5 K/UL (ref 3.5–11.3)

## 2023-04-27 PROCEDURE — 83540 ASSAY OF IRON: CPT

## 2023-04-27 PROCEDURE — 80053 COMPREHEN METABOLIC PANEL: CPT

## 2023-04-27 PROCEDURE — 85025 COMPLETE CBC W/AUTO DIFF WBC: CPT

## 2023-04-27 PROCEDURE — 83550 IRON BINDING TEST: CPT

## 2023-04-27 PROCEDURE — 82728 ASSAY OF FERRITIN: CPT

## 2023-04-27 PROCEDURE — 36415 COLL VENOUS BLD VENIPUNCTURE: CPT

## 2023-04-28 ENCOUNTER — OFFICE VISIT (OUTPATIENT)
Dept: PAIN MANAGEMENT | Age: 68
End: 2023-04-28
Payer: MEDICARE

## 2023-04-28 VITALS
HEIGHT: 63 IN | RESPIRATION RATE: 18 BRPM | WEIGHT: 227 LBS | BODY MASS INDEX: 40.22 KG/M2 | SYSTOLIC BLOOD PRESSURE: 116 MMHG | DIASTOLIC BLOOD PRESSURE: 72 MMHG | HEART RATE: 74 BPM | OXYGEN SATURATION: 90 %

## 2023-04-28 DIAGNOSIS — M47.816 LUMBAR FACET JOINT SYNDROME: ICD-10-CM

## 2023-04-28 DIAGNOSIS — M54.16 LUMBAR RADICULOPATHY: ICD-10-CM

## 2023-04-28 DIAGNOSIS — M70.61 GREATER TROCHANTERIC BURSITIS OF BOTH HIPS: Primary | ICD-10-CM

## 2023-04-28 DIAGNOSIS — M70.62 GREATER TROCHANTERIC BURSITIS OF BOTH HIPS: Primary | ICD-10-CM

## 2023-04-28 DIAGNOSIS — M51.36 DEGENERATION OF LUMBAR INTERVERTEBRAL DISC: ICD-10-CM

## 2023-04-28 PROCEDURE — 20610 DRAIN/INJ JOINT/BURSA W/O US: CPT | Performed by: NURSE PRACTITIONER

## 2023-04-28 PROCEDURE — 99214 OFFICE O/P EST MOD 30 MIN: CPT | Performed by: NURSE PRACTITIONER

## 2023-04-28 RX ORDER — TRIAMCINOLONE ACETONIDE 40 MG/ML
40 INJECTION, SUSPENSION INTRA-ARTICULAR; INTRAMUSCULAR ONCE
Status: COMPLETED | OUTPATIENT
Start: 2023-04-28 | End: 2023-04-28

## 2023-04-28 RX ORDER — TRAMADOL HYDROCHLORIDE 50 MG/1
50 TABLET ORAL 2 TIMES DAILY PRN
Qty: 60 TABLET | Refills: 1 | Status: SHIPPED | OUTPATIENT
Start: 2023-04-28 | End: 2023-05-28

## 2023-04-28 RX ORDER — LORATADINE 10 MG/1
10 TABLET ORAL DAILY
COMMUNITY
Start: 2023-03-06

## 2023-04-28 RX ADMIN — TRIAMCINOLONE ACETONIDE 40 MG: 40 INJECTION, SUSPENSION INTRA-ARTICULAR; INTRAMUSCULAR at 13:45

## 2023-04-28 ASSESSMENT — ENCOUNTER SYMPTOMS
SORE THROAT: 0
SHORTNESS OF BREATH: 0
BACK PAIN: 1
DIARRHEA: 1

## 2023-04-28 NOTE — PROGRESS NOTES
Thu Kaur  1955  8/1/22      PAIN MANAGEMENT CLINIC PROCEDURE NOTE    CHIEF COMPLAINT: This is a 80 y.o. female patient who presents to the Pain Management Clinic with a history of pain in the bilateral hip(s). PRE-PROCEDURE DIAGNOSIS: Bilateral trochanteric bursitis      POST-PROCEDURE DIAGNOSIS: same as pre-procedure diagnosis    Vitals:    04/28/23 1315   BP: 116/72   Pulse: 74   Resp: 18   SpO2: 90%     PROCEDURE:     The procedure was explained, including risks and benefits, and the patient has agreed to proceed. The injection site was marked on the patients skin. A large area around the injection site was cleaned with chlora-prep. TROCHANTERIC BURSA INJECTION  A 22G 3.5 inch spinal needle was directed towards the posterior lateral edge of the Bilateral greater trochanter. The needle was advanced until bone was reached and the patient  concordant pain. The needle was retracted slightly. The syringe was aspirated and was negative for heme or synovial fluid. Then 2ml of  2% lidocaine, 2ml of 1% lidocaine, and 40mg of kenalog  (NDC# 2892-1752-87) was injected. The injection site was cleaned and dressed with a spot band aid. The patient tolerated the procedure well and with out complication The patient was instructed avoid heat and excessive activity for 24-48 hours.

## 2023-04-28 NOTE — PROGRESS NOTES
Subjective:      Patient ID: Julissa Arciniega is a 80 y.o. female. Chief Complaint   Patient presents with    Hip Pain     both     Back Pain  Pertinent negatives include no chest pain, fever, numbness or weakness. Hip Pain   Pertinent negatives include no numbness. Here for bursae injections, would also like medication for back pain    Pain Assessment  Location of Pain: Hip  Location Modifiers: Left, Right  Severity of Pain: 8  Quality of Pain: Aching (spasms)  Duration of Pain: Persistent  Frequency of Pain: Constant  Aggravating Factors: Walking  Limiting Behavior: Yes  Relieving Factors: Rest  Result of Injury: No  Work-Related Injury: No  Are there other pain locations you wish to document?: No    Allergies   Allergen Reactions    Morphine Hives    Amoxicillin Other (See Comments)    Clavulanic Acid Other (See Comments)    Amitriptyline Nausea Only    Amoxicillin-Pot Clavulanate Nausea Only and Other (See Comments)    Aspirin Other (See Comments)     Sensitive \"gets sick\"    Capsaicin-Menthol-Methyl Sal Dermatitis    Eggs Or Egg-Derived Products Other (See Comments)    Nitrofurantoin Monohyd Macro Nausea Only    Nsaids Nausea Only       Outpatient Medications Marked as Taking for the 4/28/23 encounter (Office Visit) with Leopold Penta, APRN - CNP   Medication Sig Dispense Refill    loratadine (CLARITIN) 10 MG tablet Take 1 tablet by mouth daily      traMADol (ULTRAM) 50 MG tablet Take 1 tablet by mouth 2 times daily as needed for Pain for up to 30 days.  Max Daily Amount: 100 mg 60 tablet 1    sertraline (ZOLOFT) 50 MG tablet TAKE ONE TABLET BY MOUTH EVERY DAY      fluticasone-salmeterol (ADVAIR) 100-50 MCG/ACT AEPB diskus inhaler INHALE 1 PUFF BY MOUTH INTO LUNGS TWICE DAILY      montelukast (SINGULAIR) 10 MG tablet TAKE ONE TABLET BY MOUTH ONCE DAILY AT NIGHT 30 tablet 11    albuterol sulfate HFA (VENTOLIN HFA) 108 (90 Base) MCG/ACT inhaler Inhale 2 puffs into the lungs every 6 hours as needed for Wheezing

## 2023-05-09 ENCOUNTER — OFFICE VISIT (OUTPATIENT)
Dept: ONCOLOGY | Age: 68
End: 2023-05-09
Payer: MEDICARE

## 2023-05-09 VITALS
DIASTOLIC BLOOD PRESSURE: 64 MMHG | SYSTOLIC BLOOD PRESSURE: 112 MMHG | HEIGHT: 63 IN | HEART RATE: 72 BPM | BODY MASS INDEX: 39.51 KG/M2 | OXYGEN SATURATION: 98 % | TEMPERATURE: 97.5 F | WEIGHT: 223 LBS

## 2023-05-09 DIAGNOSIS — D50.0 IRON DEFICIENCY ANEMIA DUE TO CHRONIC BLOOD LOSS: Primary | ICD-10-CM

## 2023-05-09 PROCEDURE — 3078F DIAST BP <80 MM HG: CPT | Performed by: INTERNAL MEDICINE

## 2023-05-09 PROCEDURE — 1090F PRES/ABSN URINE INCON ASSESS: CPT | Performed by: INTERNAL MEDICINE

## 2023-05-09 PROCEDURE — 3074F SYST BP LT 130 MM HG: CPT | Performed by: INTERNAL MEDICINE

## 2023-05-09 PROCEDURE — 99213 OFFICE O/P EST LOW 20 MIN: CPT | Performed by: INTERNAL MEDICINE

## 2023-05-09 PROCEDURE — G8400 PT W/DXA NO RESULTS DOC: HCPCS | Performed by: INTERNAL MEDICINE

## 2023-05-09 PROCEDURE — G8427 DOCREV CUR MEDS BY ELIG CLIN: HCPCS | Performed by: INTERNAL MEDICINE

## 2023-05-09 PROCEDURE — G8417 CALC BMI ABV UP PARAM F/U: HCPCS | Performed by: INTERNAL MEDICINE

## 2023-05-09 PROCEDURE — 1036F TOBACCO NON-USER: CPT | Performed by: INTERNAL MEDICINE

## 2023-05-09 PROCEDURE — 1124F ACP DISCUSS-NO DSCNMKR DOCD: CPT | Performed by: INTERNAL MEDICINE

## 2023-05-09 PROCEDURE — 3017F COLORECTAL CA SCREEN DOC REV: CPT | Performed by: INTERNAL MEDICINE

## 2023-05-09 RX ORDER — AMOXICILLIN AND CLAVULANATE POTASSIUM 500; 125 MG/1; MG/1
TABLET, FILM COATED ORAL
COMMUNITY
Start: 2023-05-02

## 2023-05-09 RX ORDER — PAROXETINE 10 MG/1
10 TABLET, FILM COATED ORAL EVERY MORNING
COMMUNITY

## 2023-05-09 NOTE — PROGRESS NOTES
Patient ID: Asael Guardado, 1955, [birth year], 80 y.o. Referred by : DIAMOND Quiles CNP  Reason for consultation:   Anemia  Iron deficiency status post IV iron infusion  Currently on 1 iron pill daily  HISTORY OF PRESENT ILLNESS:    Hematologic history:  Jorden Stevens is a 59-year-old female with history of chronic anemia was seen there initial consultation visit. She reports that she has history of anemia for past several years and for past 2 years she has been receiving intermittent PRBC and iron transfusions from her primary care physician. She reports her last transfusion was about in August 2019 prior to her vascular surgery. She reports she has taken iron pills in the past but had significant GI side effects so she had stopped. She reports history of IBS and also had upper endoscopy and colonoscopy in 2018 was negative for any active bleeding. Her GI evaluation was done at [facility]. Most recently her stool for occult blood was negative checked in March of this year. Her CBC on 3/18/2020 showed hemoglobin of 9.9, WBC 10.2, platelets 136. Her ferritin 13.3, iron 15, TIBC 366 and iron saturation 4% noted on 3/18/2020. INTERVAL HISTORY:  Patient presents to the clinic for follow-up visit and to discuss results of her lab work-up. Patient takes iron supplement once or twice a week. States gets constipated if takes it more frequently. Ferritin has declined to 27 but hemoglobin is within range. During this visit patient's allergy, social, medical, surgical history and medications were reviewed and updated.     Past Medical History:   Diagnosis Date    Adjustment disorder with mixed emotional features 02/29/2020    Anemia 02/29/2020    Anxiety 02/29/2020    Asthma 03/03/2009    Chronic airway obstruction (HCC)     Chronic idiopathic constipation     Chronic pain     Chronic sinusitis     Colon cancer (HCC)     Degeneration of lumbar intervertebral disc     Diastolic heart failure (HCC)     GERD

## 2023-05-10 ENCOUNTER — OFFICE VISIT (OUTPATIENT)
Dept: PULMONOLOGY | Age: 68
End: 2023-05-10
Payer: MEDICARE

## 2023-05-10 VITALS
WEIGHT: 223 LBS | DIASTOLIC BLOOD PRESSURE: 74 MMHG | HEIGHT: 63 IN | BODY MASS INDEX: 39.51 KG/M2 | SYSTOLIC BLOOD PRESSURE: 118 MMHG | HEART RATE: 86 BPM | OXYGEN SATURATION: 97 % | TEMPERATURE: 97.3 F

## 2023-05-10 DIAGNOSIS — J44.9 STAGE 2 MODERATE COPD BY GOLD CLASSIFICATION (HCC): ICD-10-CM

## 2023-05-10 DIAGNOSIS — Z99.89 OSA ON CPAP: Primary | ICD-10-CM

## 2023-05-10 DIAGNOSIS — G47.33 OSA ON CPAP: Primary | ICD-10-CM

## 2023-05-10 DIAGNOSIS — U07.1 COVID-19 VIRUS INFECTION: ICD-10-CM

## 2023-05-10 DIAGNOSIS — J96.11 CHRONIC RESPIRATORY FAILURE WITH HYPOXIA, ON HOME OXYGEN THERAPY (HCC): ICD-10-CM

## 2023-05-10 DIAGNOSIS — Z99.81 CHRONIC RESPIRATORY FAILURE WITH HYPOXIA, ON HOME OXYGEN THERAPY (HCC): ICD-10-CM

## 2023-05-10 DIAGNOSIS — E66.01 CLASS 2 SEVERE OBESITY DUE TO EXCESS CALORIES WITH SERIOUS COMORBIDITY AND BODY MASS INDEX (BMI) OF 37.0 TO 37.9 IN ADULT (HCC): ICD-10-CM

## 2023-05-10 PROCEDURE — 3078F DIAST BP <80 MM HG: CPT | Performed by: NURSE PRACTITIONER

## 2023-05-10 PROCEDURE — 99214 OFFICE O/P EST MOD 30 MIN: CPT | Performed by: NURSE PRACTITIONER

## 2023-05-10 PROCEDURE — 3023F SPIROM DOC REV: CPT | Performed by: NURSE PRACTITIONER

## 2023-05-10 PROCEDURE — 3074F SYST BP LT 130 MM HG: CPT | Performed by: NURSE PRACTITIONER

## 2023-05-10 PROCEDURE — 1036F TOBACCO NON-USER: CPT | Performed by: NURSE PRACTITIONER

## 2023-05-10 PROCEDURE — 1124F ACP DISCUSS-NO DSCNMKR DOCD: CPT | Performed by: NURSE PRACTITIONER

## 2023-05-10 PROCEDURE — 99213 OFFICE O/P EST LOW 20 MIN: CPT | Performed by: NURSE PRACTITIONER

## 2023-05-10 PROCEDURE — G8400 PT W/DXA NO RESULTS DOC: HCPCS | Performed by: NURSE PRACTITIONER

## 2023-05-10 PROCEDURE — G8427 DOCREV CUR MEDS BY ELIG CLIN: HCPCS | Performed by: NURSE PRACTITIONER

## 2023-05-10 PROCEDURE — 1090F PRES/ABSN URINE INCON ASSESS: CPT | Performed by: NURSE PRACTITIONER

## 2023-05-10 PROCEDURE — G8417 CALC BMI ABV UP PARAM F/U: HCPCS | Performed by: NURSE PRACTITIONER

## 2023-05-10 PROCEDURE — 3017F COLORECTAL CA SCREEN DOC REV: CPT | Performed by: NURSE PRACTITIONER

## 2023-05-10 RX ORDER — ALBUTEROL SULFATE 90 UG/1
2 AEROSOL, METERED RESPIRATORY (INHALATION) EVERY 6 HOURS PRN
Qty: 3 EACH | Refills: 3 | Status: SHIPPED | OUTPATIENT
Start: 2023-05-10

## 2023-05-10 RX ORDER — BENZONATATE 100 MG/1
100 CAPSULE ORAL 3 TIMES DAILY PRN
Qty: 270 CAPSULE | Refills: 3 | Status: SHIPPED | OUTPATIENT
Start: 2023-05-10

## 2023-05-10 RX ORDER — IPRATROPIUM BROMIDE AND ALBUTEROL SULFATE 2.5; .5 MG/3ML; MG/3ML
SOLUTION RESPIRATORY (INHALATION)
Qty: 360 ML | Refills: 3 | Status: SHIPPED | OUTPATIENT
Start: 2023-05-10

## 2023-05-10 RX ORDER — MONTELUKAST SODIUM 10 MG/1
TABLET ORAL
Qty: 90 TABLET | Refills: 3 | Status: SHIPPED | OUTPATIENT
Start: 2023-05-10

## 2023-05-10 RX ORDER — FLUTICASONE PROPIONATE AND SALMETEROL 500; 50 UG/1; UG/1
1 POWDER RESPIRATORY (INHALATION) EVERY 12 HOURS
Qty: 3 EACH | Refills: 3 | Status: SHIPPED | OUTPATIENT
Start: 2023-05-10

## 2023-05-10 RX ORDER — ALBUTEROL SULFATE 2.5 MG/3ML
2.5 SOLUTION RESPIRATORY (INHALATION) 3 TIMES DAILY
Qty: 360 EACH | Refills: 3 | Status: SHIPPED | OUTPATIENT
Start: 2023-05-10

## 2023-05-10 ASSESSMENT — ENCOUNTER SYMPTOMS
EYES NEGATIVE: 1
GASTROINTESTINAL NEGATIVE: 1
ALLERGIC/IMMUNOLOGIC NEGATIVE: 1

## 2023-06-29 DIAGNOSIS — J96.11 CHRONIC RESPIRATORY FAILURE WITH HYPOXIA, ON HOME OXYGEN THERAPY (HCC): Primary | ICD-10-CM

## 2023-06-29 DIAGNOSIS — J44.9 STAGE 2 MODERATE COPD BY GOLD CLASSIFICATION (HCC): ICD-10-CM

## 2023-06-29 DIAGNOSIS — Z99.81 CHRONIC RESPIRATORY FAILURE WITH HYPOXIA, ON HOME OXYGEN THERAPY (HCC): Primary | ICD-10-CM

## 2023-06-29 RX ORDER — IPRATROPIUM BROMIDE AND ALBUTEROL SULFATE 2.5; .5 MG/3ML; MG/3ML
SOLUTION RESPIRATORY (INHALATION)
Qty: 360 ML | Refills: 3 | Status: SHIPPED | OUTPATIENT
Start: 2023-06-29

## 2023-07-27 ENCOUNTER — PATIENT MESSAGE (OUTPATIENT)
Dept: PULMONOLOGY | Age: 68
End: 2023-07-27

## 2023-07-27 NOTE — TELEPHONE ENCOUNTER
From: Lashonda Garcia Hand  To: Soo Lamp  Sent: 7/27/2023 2:17 PM EDT  Subject: My mask for my CPAP    I need my mask for my CPAP refilled. They said they sent it last Friday.  But I need a new one thank you

## 2023-07-27 NOTE — TELEPHONE ENCOUNTER
Patient needs an order for new CPAP supplies, thanks.       Last Appt:  5/10/2023  Next Appt:   11/8/2023  Med verified in Epic

## 2023-07-30 DIAGNOSIS — Z99.89 OSA ON CPAP: Primary | ICD-10-CM

## 2023-07-30 DIAGNOSIS — G47.33 OSA ON CPAP: Primary | ICD-10-CM

## 2023-10-11 ENCOUNTER — OFFICE VISIT (OUTPATIENT)
Dept: PULMONOLOGY | Age: 68
End: 2023-10-11
Payer: MEDICARE

## 2023-10-11 VITALS
OXYGEN SATURATION: 96 % | HEART RATE: 92 BPM | DIASTOLIC BLOOD PRESSURE: 68 MMHG | TEMPERATURE: 97.6 F | HEIGHT: 63 IN | BODY MASS INDEX: 41.11 KG/M2 | SYSTOLIC BLOOD PRESSURE: 114 MMHG | WEIGHT: 232 LBS

## 2023-10-11 DIAGNOSIS — J96.11 CHRONIC RESPIRATORY FAILURE WITH HYPOXIA, ON HOME OXYGEN THERAPY (HCC): Primary | ICD-10-CM

## 2023-10-11 DIAGNOSIS — G47.33 OSA ON CPAP: ICD-10-CM

## 2023-10-11 DIAGNOSIS — U07.1 COVID-19 VIRUS INFECTION: ICD-10-CM

## 2023-10-11 DIAGNOSIS — Z99.81 CHRONIC RESPIRATORY FAILURE WITH HYPOXIA, ON HOME OXYGEN THERAPY (HCC): Primary | ICD-10-CM

## 2023-10-11 DIAGNOSIS — E66.01 CLASS 2 SEVERE OBESITY DUE TO EXCESS CALORIES WITH SERIOUS COMORBIDITY AND BODY MASS INDEX (BMI) OF 37.0 TO 37.9 IN ADULT (HCC): ICD-10-CM

## 2023-10-11 PROCEDURE — 1090F PRES/ABSN URINE INCON ASSESS: CPT | Performed by: NURSE PRACTITIONER

## 2023-10-11 PROCEDURE — G8484 FLU IMMUNIZE NO ADMIN: HCPCS | Performed by: NURSE PRACTITIONER

## 2023-10-11 PROCEDURE — 1124F ACP DISCUSS-NO DSCNMKR DOCD: CPT | Performed by: NURSE PRACTITIONER

## 2023-10-11 PROCEDURE — 3078F DIAST BP <80 MM HG: CPT | Performed by: NURSE PRACTITIONER

## 2023-10-11 PROCEDURE — G8427 DOCREV CUR MEDS BY ELIG CLIN: HCPCS | Performed by: NURSE PRACTITIONER

## 2023-10-11 PROCEDURE — G8417 CALC BMI ABV UP PARAM F/U: HCPCS | Performed by: NURSE PRACTITIONER

## 2023-10-11 PROCEDURE — G8400 PT W/DXA NO RESULTS DOC: HCPCS | Performed by: NURSE PRACTITIONER

## 2023-10-11 PROCEDURE — 3017F COLORECTAL CA SCREEN DOC REV: CPT | Performed by: NURSE PRACTITIONER

## 2023-10-11 PROCEDURE — 99214 OFFICE O/P EST MOD 30 MIN: CPT | Performed by: NURSE PRACTITIONER

## 2023-10-11 PROCEDURE — 3074F SYST BP LT 130 MM HG: CPT | Performed by: NURSE PRACTITIONER

## 2023-10-11 PROCEDURE — 1036F TOBACCO NON-USER: CPT | Performed by: NURSE PRACTITIONER

## 2023-10-11 RX ORDER — ACETYLCYSTEINE 600 MG
CAPSULE ORAL
COMMUNITY

## 2023-10-11 RX ORDER — MULTIVIT-MIN/IRON/FOLIC ACID/K 18-600-40
CAPSULE ORAL
COMMUNITY

## 2023-10-11 ASSESSMENT — ENCOUNTER SYMPTOMS
EYES NEGATIVE: 1
ALLERGIC/IMMUNOLOGIC NEGATIVE: 1
GASTROINTESTINAL NEGATIVE: 1

## 2023-10-11 NOTE — PROGRESS NOTES
Subjective:      Patient ID: Evie Howell is a 80 y.o. female. HPI  Patient here for sick call, shortness of breath. Last seen in office per me in May 2023 and Dr. Evon Carrillo in August 2022. Diagnosed with COVID 9/29/23. Was in [place] and was around sick children. Sick 9/26- with symptoms. 3rd time with Covid. Chills aches and pains, headache. Cough productive thick green improving, symptoms worse at night, when lying down. Continues to take Mucinex, continuing with her albuterol nebulizer treatments. Patient does have DuoNeb, encourage patient to utilize DuoNeb in the interim to help with thinning secretions. Medications:   Medications:   Oxygen: 2 LPM- usually around the clock. Singulair 10 mg:  DuoNeb:  Advair 100-50 mcg: Albuterol HFA/neb    Smoking status:  Patient quit smoking in 1984 with a 14-pack-year smoking history     PRIOR WORKUP:  PFT:  PFT 3/21/2022: FVC 2.48, 79% of predicted, postbronchodilator 2.97 L.  FEV1 1.24, 51% of predicted, postbronchodilator 1.39 L.  FEV1/FVC ratio is 50, postbronchodilator 47. FEF 25-75 is 0.47, 23% of predicted, postbronchodilator 0.42. TLC is 5.31, 110% of predicted. RV is 2.83, 149% of predicted. DLCO is 14.1, 58% of predicted. 6-minute walk test 3/21/22: SPO2 on room air was 92%, patient was ambulating on room air and dropped down to 83% was placed on 2 L nasal cannula ultimately titrated to 4 L nasal cannula to maintain an SPO2 of greater than 88%. CT Imaging:  Chest x-ray 8/10/2022: Moderate hiatal hernia. Linear opacity in the right lung base likely representing atelectasis. Increased opacification in the left lung base could represent a combination of pleural effusions/atelectasis/infiltrate    CTA chest 3/25/2022: Emphysematous changes at the lung apices with bibasilar atelectasis/scarring. No consolidation or effusion. Right hilar lymph node measuring 1.4 cm with a moderate to large hiatal hernia. Negative for pulmonary embolism.

## 2023-11-08 ENCOUNTER — HOSPITAL ENCOUNTER (OUTPATIENT)
Age: 68
Discharge: HOME | End: 2023-11-08
Payer: MEDICARE

## 2023-11-08 ENCOUNTER — OFFICE VISIT (OUTPATIENT)
Dept: PULMONOLOGY | Age: 68
End: 2023-11-08
Payer: MEDICARE

## 2023-11-08 VITALS
OXYGEN SATURATION: 96 % | SYSTOLIC BLOOD PRESSURE: 120 MMHG | DIASTOLIC BLOOD PRESSURE: 82 MMHG | WEIGHT: 228.8 LBS | BODY MASS INDEX: 40.53 KG/M2 | HEART RATE: 72 BPM | RESPIRATION RATE: 16 BRPM

## 2023-11-08 DIAGNOSIS — G47.33 OSA ON CPAP: ICD-10-CM

## 2023-11-08 DIAGNOSIS — E66.01 CLASS 3 SEVERE OBESITY DUE TO EXCESS CALORIES WITH SERIOUS COMORBIDITY AND BODY MASS INDEX (BMI) OF 40.0 TO 44.9 IN ADULT (HCC): ICD-10-CM

## 2023-11-08 DIAGNOSIS — J96.11 CHRONIC RESPIRATORY FAILURE WITH HYPOXIA, ON HOME OXYGEN THERAPY (HCC): ICD-10-CM

## 2023-11-08 DIAGNOSIS — Z99.81 CHRONIC RESPIRATORY FAILURE WITH HYPOXIA, ON HOME OXYGEN THERAPY (HCC): ICD-10-CM

## 2023-11-08 DIAGNOSIS — U07.1 COVID-19 VIRUS INFECTION: ICD-10-CM

## 2023-11-08 DIAGNOSIS — J44.9 STAGE 2 MODERATE COPD BY GOLD CLASSIFICATION (HCC): Primary | ICD-10-CM

## 2023-11-08 DIAGNOSIS — D50.0 IRON DEFICIENCY ANEMIA DUE TO CHRONIC BLOOD LOSS: ICD-10-CM

## 2023-11-08 LAB
BASOPHILS # BLD: 0.05 K/UL (ref 0–0.2)
BASOPHILS NFR BLD: 1 % (ref 0–2)
EOSINOPHIL # BLD: 0.11 K/UL (ref 0–0.44)
EOSINOPHILS RELATIVE PERCENT: 2 % (ref 1–4)
ERYTHROCYTE [DISTWIDTH] IN BLOOD BY AUTOMATED COUNT: 14.8 % (ref 11.8–14.4)
HCT VFR BLD AUTO: 39.2 % (ref 36.3–47.1)
HGB BLD-MCNC: 12.5 G/DL (ref 11.9–15.1)
IMM GRANULOCYTES # BLD AUTO: <0.03 K/UL (ref 0–0.3)
IMM GRANULOCYTES NFR BLD: 0 %
LYMPHOCYTES NFR BLD: 1.48 K/UL (ref 1.1–3.7)
LYMPHOCYTES RELATIVE PERCENT: 25 % (ref 24–43)
MCH RBC QN AUTO: 30 PG (ref 25.2–33.5)
MCHC RBC AUTO-ENTMCNC: 31.9 G/DL (ref 25.2–33.5)
MCV RBC AUTO: 94.2 FL (ref 82.6–102.9)
MONOCYTES NFR BLD: 0.53 K/UL (ref 0.1–1.2)
MONOCYTES NFR BLD: 9 % (ref 3–12)
NEUTROPHILS NFR BLD: 63 % (ref 36–65)
NEUTS SEG NFR BLD: 3.78 K/UL (ref 1.5–8.1)
NRBC BLD-RTO: 0 PER 100 WBC
PLATELET # BLD AUTO: 338 K/UL (ref 138–453)
PMV BLD AUTO: 9.1 FL (ref 8.1–13.5)
RBC # BLD AUTO: 4.16 M/UL (ref 3.95–5.11)
RBC # BLD: ABNORMAL 10*6/UL
WBC OTHER # BLD: 6 K/UL (ref 3.5–11.3)

## 2023-11-08 PROCEDURE — 3023F SPIROM DOC REV: CPT | Performed by: INTERNAL MEDICINE

## 2023-11-08 PROCEDURE — 1036F TOBACCO NON-USER: CPT | Performed by: INTERNAL MEDICINE

## 2023-11-08 PROCEDURE — 99214 OFFICE O/P EST MOD 30 MIN: CPT | Performed by: INTERNAL MEDICINE

## 2023-11-08 PROCEDURE — 36415 COLL VENOUS BLD VENIPUNCTURE: CPT

## 2023-11-08 PROCEDURE — 3017F COLORECTAL CA SCREEN DOC REV: CPT | Performed by: INTERNAL MEDICINE

## 2023-11-08 PROCEDURE — 83540 ASSAY OF IRON: CPT

## 2023-11-08 PROCEDURE — 83550 IRON BINDING TEST: CPT

## 2023-11-08 PROCEDURE — 99213 OFFICE O/P EST LOW 20 MIN: CPT | Performed by: INTERNAL MEDICINE

## 2023-11-08 PROCEDURE — 85025 COMPLETE CBC W/AUTO DIFF WBC: CPT

## 2023-11-08 PROCEDURE — 3074F SYST BP LT 130 MM HG: CPT | Performed by: INTERNAL MEDICINE

## 2023-11-08 PROCEDURE — G8427 DOCREV CUR MEDS BY ELIG CLIN: HCPCS | Performed by: INTERNAL MEDICINE

## 2023-11-08 PROCEDURE — 82728 ASSAY OF FERRITIN: CPT

## 2023-11-08 PROCEDURE — 1090F PRES/ABSN URINE INCON ASSESS: CPT | Performed by: INTERNAL MEDICINE

## 2023-11-08 PROCEDURE — G8417 CALC BMI ABV UP PARAM F/U: HCPCS | Performed by: INTERNAL MEDICINE

## 2023-11-08 PROCEDURE — G8484 FLU IMMUNIZE NO ADMIN: HCPCS | Performed by: INTERNAL MEDICINE

## 2023-11-08 PROCEDURE — G8400 PT W/DXA NO RESULTS DOC: HCPCS | Performed by: INTERNAL MEDICINE

## 2023-11-08 PROCEDURE — 3079F DIAST BP 80-89 MM HG: CPT | Performed by: INTERNAL MEDICINE

## 2023-11-08 PROCEDURE — 1124F ACP DISCUSS-NO DSCNMKR DOCD: CPT | Performed by: INTERNAL MEDICINE

## 2023-11-08 ASSESSMENT — ENCOUNTER SYMPTOMS
BACK PAIN: 1
CHEST TIGHTNESS: 1
EYES NEGATIVE: 1
SHORTNESS OF BREATH: 1
GASTROINTESTINAL NEGATIVE: 1

## 2023-11-08 NOTE — PROGRESS NOTES
Subjective:      Patient ID: Dhiraj Mcfarlane is a 80 y.o. female being seen in my clinic for   Chief Complaint   Patient presents with    Follow-up     Feels like her throat is \"closing\"       HPI  Patient returns for follow up of sleep apnea and stage II COPD complicated by chronic hypoxemic respiratory failure. Since last visit 6 months ago, patient has been very compliant with CPAP. Uses every night, for the entire night. Reports refreshing and restorative sleep. Denies snoring. Reports normal daytime alertness. Believes benefiting greatly. Compliance download from 10/7/23 to 11/6/23 shows 100% compliance for average of 7.6hrs per night. Residual AHI <5. CPAP [id number] cm H2O. Patient is experiencing grief. She lost her son last spring. Apparently complications of chronic hypoxemic respiratory failure and ventilator dependence. Continues to grieve. She states that about 6 weeks ago she tested positive for COVID. Her only symptoms were fluctuating oxygen saturations. She is fully vaccinated. She uses oxygen 2 L a minute. She states that sometimes her saturations dropped down to 92%. Generally not below 90. Uses oxygen 2 L bleed in with CPAP. Short of breath at rest and with minimal exertion. On maximal bronchodilator therapy for asthma/COPD overlap syndrome. Quit smoking many years ago after about a 14-pack-year history. Believes she has had asthma all of her life. Reports childhood symptoms although not formally diagnosed. Airways remodeling may account for chronic airflow obstruction. Review of Systems   Constitutional: Negative. HENT: Negative. Eyes: Negative. Respiratory:  Positive for chest tightness and shortness of breath. Cardiovascular:  Positive for leg swelling. Gastrointestinal: Negative. Musculoskeletal:  Positive for arthralgias, back pain and gait problem. All other systems reviewed and are negative.       Objective:     Vitals:    11/08/23 1147   BP:

## 2023-11-09 ENCOUNTER — OFFICE VISIT (OUTPATIENT)
Dept: PAIN MANAGEMENT | Age: 68
End: 2023-11-09
Payer: MEDICARE

## 2023-11-09 VITALS
BODY MASS INDEX: 41.29 KG/M2 | DIASTOLIC BLOOD PRESSURE: 84 MMHG | WEIGHT: 233 LBS | SYSTOLIC BLOOD PRESSURE: 136 MMHG | HEIGHT: 63 IN | HEART RATE: 93 BPM | RESPIRATION RATE: 18 BRPM | OXYGEN SATURATION: 95 %

## 2023-11-09 DIAGNOSIS — M70.61 GREATER TROCHANTERIC BURSITIS OF BOTH HIPS: ICD-10-CM

## 2023-11-09 DIAGNOSIS — G89.4 CHRONIC PAIN SYNDROME: Primary | ICD-10-CM

## 2023-11-09 DIAGNOSIS — M70.62 GREATER TROCHANTERIC BURSITIS OF BOTH HIPS: ICD-10-CM

## 2023-11-09 LAB
FERRITIN SERPL-MCNC: 26 NG/ML (ref 13–150)
IRON SATN MFR SERPL: 16 % (ref 20–55)
IRON SERPL-MCNC: 60 UG/DL (ref 37–145)
TIBC SERPL-MCNC: 375 UG/DL (ref 250–450)
UNSATURATED IRON BINDING CAPACITY: 315 UG/DL (ref 112–347)

## 2023-11-09 PROCEDURE — 20610 DRAIN/INJ JOINT/BURSA W/O US: CPT | Performed by: NURSE PRACTITIONER

## 2023-11-09 PROCEDURE — 99214 OFFICE O/P EST MOD 30 MIN: CPT | Performed by: NURSE PRACTITIONER

## 2023-11-09 PROCEDURE — PBSHW PBB SHADOW CHARGE: Performed by: NURSE PRACTITIONER

## 2023-11-09 RX ORDER — TRIAMCINOLONE ACETONIDE 40 MG/ML
40 INJECTION, SUSPENSION INTRA-ARTICULAR; INTRAMUSCULAR ONCE
Status: COMPLETED | OUTPATIENT
Start: 2023-11-09 | End: 2023-11-09

## 2023-11-09 RX ADMIN — TRIAMCINOLONE ACETONIDE 40 MG: 40 INJECTION, SUSPENSION INTRA-ARTICULAR; INTRAMUSCULAR at 14:19

## 2023-11-13 ENCOUNTER — OFFICE VISIT (OUTPATIENT)
Dept: ONCOLOGY | Age: 68
End: 2023-11-13
Payer: MEDICARE

## 2023-11-13 VITALS
TEMPERATURE: 97.2 F | DIASTOLIC BLOOD PRESSURE: 82 MMHG | BODY MASS INDEX: 39.9 KG/M2 | HEART RATE: 73 BPM | SYSTOLIC BLOOD PRESSURE: 120 MMHG | HEIGHT: 63 IN | WEIGHT: 225.2 LBS | OXYGEN SATURATION: 97 %

## 2023-11-13 DIAGNOSIS — D50.0 IRON DEFICIENCY ANEMIA DUE TO CHRONIC BLOOD LOSS: Primary | ICD-10-CM

## 2023-11-13 PROCEDURE — 3074F SYST BP LT 130 MM HG: CPT | Performed by: INTERNAL MEDICINE

## 2023-11-13 PROCEDURE — 99213 OFFICE O/P EST LOW 20 MIN: CPT | Performed by: INTERNAL MEDICINE

## 2023-11-13 PROCEDURE — 3079F DIAST BP 80-89 MM HG: CPT | Performed by: INTERNAL MEDICINE

## 2023-11-13 PROCEDURE — G8484 FLU IMMUNIZE NO ADMIN: HCPCS | Performed by: INTERNAL MEDICINE

## 2023-11-13 PROCEDURE — 1036F TOBACCO NON-USER: CPT | Performed by: INTERNAL MEDICINE

## 2023-11-13 PROCEDURE — 1090F PRES/ABSN URINE INCON ASSESS: CPT | Performed by: INTERNAL MEDICINE

## 2023-11-13 PROCEDURE — G8400 PT W/DXA NO RESULTS DOC: HCPCS | Performed by: INTERNAL MEDICINE

## 2023-11-13 PROCEDURE — G8417 CALC BMI ABV UP PARAM F/U: HCPCS | Performed by: INTERNAL MEDICINE

## 2023-11-13 PROCEDURE — 1124F ACP DISCUSS-NO DSCNMKR DOCD: CPT | Performed by: INTERNAL MEDICINE

## 2023-11-13 PROCEDURE — 3017F COLORECTAL CA SCREEN DOC REV: CPT | Performed by: INTERNAL MEDICINE

## 2023-11-13 PROCEDURE — 99214 OFFICE O/P EST MOD 30 MIN: CPT | Performed by: INTERNAL MEDICINE

## 2023-11-13 PROCEDURE — G8427 DOCREV CUR MEDS BY ELIG CLIN: HCPCS | Performed by: INTERNAL MEDICINE

## 2023-11-13 NOTE — PROGRESS NOTES
Patient ID: Santiago Berger, 1955, [birth year], 80 y.o. Referred by : Kennedy Mendoza MD  Reason for consultation:   Anemia  Iron deficiency status post IV iron infusion  Currently on 1 iron pill daily  HISTORY OF PRESENT ILLNESS:    Hematologic history:  Enma Vega is a 70-year-old female with history of chronic anemia was seen there initial consultation visit. She reports that she has history of anemia for past several years and for past 2 years she has been receiving intermittent PRBC and iron transfusions from her primary care physician. She reports her last transfusion was about in August 2019 prior to her vascular surgery. She reports she has taken iron pills in the past but had significant GI side effects so she had stopped. She reports history of IBS and also had upper endoscopy and colonoscopy in 2018 was negative for any active bleeding. Her GI evaluation was done at [facility]. Most recently her stool for occult blood was negative checked in March of this year. Her CBC on 3/18/2020 showed hemoglobin of 9.9, WBC 10.2, platelets 139. Her ferritin 13.3, iron 15, TIBC 366 and iron saturation 4% noted on 3/18/2020. INTERVAL HISTORY:  Patient is return for follow-up visit and to discuss lab results and further recommendations. She was taking iron pill once daily but has stopped because of significant constipation. Recent lab work showed stable hemoglobin however her iron saturation has slightly dropped. She denies any chest pain shortness of breath, excessive fatigue tiredness. During this visit patient's allergy, social, medical, surgical history and medications were reviewed and updated.     Past Medical History:   Diagnosis Date    Adjustment disorder with mixed emotional features 02/29/2020    Anemia 02/29/2020    Anxiety 02/29/2020    Asthma 03/03/2009    Chronic airway obstruction (HCC)     Chronic idiopathic constipation     Chronic pain     Chronic sinusitis     Colon cancer ([address])

## 2023-12-15 ENCOUNTER — HOSPITAL ENCOUNTER (OUTPATIENT)
Age: 68
Discharge: HOME | End: 2023-12-15
Payer: MEDICARE

## 2023-12-15 DIAGNOSIS — D50.0 IRON DEFICIENCY ANEMIA DUE TO CHRONIC BLOOD LOSS: ICD-10-CM

## 2023-12-15 LAB
BASOPHILS # BLD: 0.04 K/UL (ref 0–0.2)
BASOPHILS NFR BLD: 1 % (ref 0–2)
EOSINOPHIL # BLD: 0.06 K/UL (ref 0–0.44)
EOSINOPHILS RELATIVE PERCENT: 1 % (ref 1–4)
ERYTHROCYTE [DISTWIDTH] IN BLOOD BY AUTOMATED COUNT: 15.9 % (ref 11.8–14.4)
FERRITIN SERPL-MCNC: 46 NG/ML (ref 13–150)
HCT VFR BLD AUTO: 36.2 % (ref 36.3–47.1)
HGB BLD-MCNC: 11.9 G/DL (ref 11.9–15.1)
IMM GRANULOCYTES # BLD AUTO: <0.03 K/UL (ref 0–0.3)
IMM GRANULOCYTES NFR BLD: 0 %
IRON SATN MFR SERPL: 30 % (ref 20–55)
IRON SERPL-MCNC: 94 UG/DL (ref 37–145)
LYMPHOCYTES NFR BLD: 1.2 K/UL (ref 1.1–3.7)
LYMPHOCYTES RELATIVE PERCENT: 21 % (ref 24–43)
MCH RBC QN AUTO: 30.8 PG (ref 25.2–33.5)
MCHC RBC AUTO-ENTMCNC: 32.9 G/DL (ref 25.2–33.5)
MCV RBC AUTO: 93.8 FL (ref 82.6–102.9)
MONOCYTES NFR BLD: 0.47 K/UL (ref 0.1–1.2)
MONOCYTES NFR BLD: 8 % (ref 3–12)
NEUTROPHILS NFR BLD: 69 % (ref 36–65)
NEUTS SEG NFR BLD: 3.83 K/UL (ref 1.5–8.1)
NRBC BLD-RTO: 0 PER 100 WBC
PLATELET # BLD AUTO: 334 K/UL (ref 138–453)
PMV BLD AUTO: 8.9 FL (ref 8.1–13.5)
RBC # BLD AUTO: 3.86 M/UL (ref 3.95–5.11)
RBC # BLD: ABNORMAL 10*6/UL
TIBC SERPL-MCNC: 314 UG/DL (ref 250–450)
UNSATURATED IRON BINDING CAPACITY: 220 UG/DL (ref 112–347)
WBC OTHER # BLD: 5.6 K/UL (ref 3.5–11.3)

## 2023-12-15 PROCEDURE — 85025 COMPLETE CBC W/AUTO DIFF WBC: CPT

## 2023-12-15 PROCEDURE — 83540 ASSAY OF IRON: CPT

## 2023-12-15 PROCEDURE — 83550 IRON BINDING TEST: CPT

## 2023-12-15 PROCEDURE — 82728 ASSAY OF FERRITIN: CPT

## 2023-12-15 PROCEDURE — 36415 COLL VENOUS BLD VENIPUNCTURE: CPT

## 2023-12-27 ENCOUNTER — TELEPHONE (OUTPATIENT)
Dept: ONCOLOGY | Age: 68
End: 2023-12-27

## 2023-12-27 NOTE — TELEPHONE ENCOUNTER
Pt called in wanting to know results of recent labs that Dr. Andy Mcburney ordered. Pt scheduled for f/u in April.  Please advise

## 2024-01-10 ENCOUNTER — TELEPHONE (OUTPATIENT)
Dept: PULMONOLOGY | Age: 69
End: 2024-01-10

## 2024-01-10 DIAGNOSIS — J20.9 ACUTE BRONCHITIS, UNSPECIFIED ORGANISM: Primary | ICD-10-CM

## 2024-01-10 DIAGNOSIS — B33.8 RSV INFECTION: ICD-10-CM

## 2024-01-10 RX ORDER — AZITHROMYCIN 250 MG/1
250 TABLET, FILM COATED ORAL SEE ADMIN INSTRUCTIONS
Qty: 6 TABLET | Refills: 0 | Status: SHIPPED | OUTPATIENT
Start: 2024-01-10 | End: 2024-01-15

## 2024-01-10 NOTE — TELEPHONE ENCOUNTER
Patient was diagnosed at [facility] emergency department with RSV infection 2 days ago.  Reports increasing shortness of breath and cough productive of yellowish phlegm.  Had a chest x-ray done which reported linear atelectasis right base.  Not available for my review however previous x-rays that I have reviewed show chronic linear changes at both lung bases.  Patient on oxygen 2 L a minute chronically.  Reports minor oxygen desaturations and more shortness of breath.  She received an injection of Decadron in the emergency department and is scheduled to see Dr. Sotelo tomorrow for another injection.  Declines oral steroids \"I have been on steroids off and on for weeks.\"  Ordered Zithromax.  Continue oxygen.  May increase to 3 L.  If symptoms worse report back to emergency department.

## 2024-02-02 ENCOUNTER — PATIENT MESSAGE (OUTPATIENT)
Dept: PULMONOLOGY | Age: 69
End: 2024-02-02

## 2024-02-02 ENCOUNTER — TELEPHONE (OUTPATIENT)
Dept: PULMONOLOGY | Age: 69
End: 2024-02-02

## 2024-02-02 DIAGNOSIS — J44.9 STAGE 2 MODERATE COPD BY GOLD CLASSIFICATION (HCC): Primary | ICD-10-CM

## 2024-02-02 NOTE — TELEPHONE ENCOUNTER
Patient is asking for an order for a new nebulizer machine. States hers is old and not working correctly. [place] [facility] [place].       Last Appt:  11/8/2023  Next Appt:   5/8/2024  Med verified in Epic

## 2024-02-06 ENCOUNTER — OFFICE VISIT (OUTPATIENT)
Dept: INFECTIOUS DISEASES | Age: 69
End: 2024-02-06
Payer: MEDICARE

## 2024-02-06 ENCOUNTER — HOSPITAL ENCOUNTER (OUTPATIENT)
Age: 69
Setting detail: SPECIMEN
Discharge: HOME | End: 2024-02-06

## 2024-02-06 VITALS
HEIGHT: 63 IN | BODY MASS INDEX: 38.62 KG/M2 | RESPIRATION RATE: 13 BRPM | SYSTOLIC BLOOD PRESSURE: 121 MMHG | OXYGEN SATURATION: 96 % | WEIGHT: 218 LBS | DIASTOLIC BLOOD PRESSURE: 63 MMHG | HEART RATE: 93 BPM | TEMPERATURE: 97.8 F

## 2024-02-06 DIAGNOSIS — J22 RECURRENT LOWER RESPIRATORY TRACT INFECTION: Primary | ICD-10-CM

## 2024-02-06 DIAGNOSIS — D84.821 IMMUNODEFICIENCY DUE TO DRUGS (CODE) (HCC): ICD-10-CM

## 2024-02-06 DIAGNOSIS — J44.9 CHRONIC OBSTRUCTIVE PULMONARY DISEASE, UNSPECIFIED COPD TYPE (HCC): ICD-10-CM

## 2024-02-06 DIAGNOSIS — J96.11 CHRONIC HYPOXIC RESPIRATORY FAILURE, ON HOME OXYGEN THERAPY (HCC): ICD-10-CM

## 2024-02-06 DIAGNOSIS — J22 RECURRENT LOWER RESPIRATORY TRACT INFECTION: ICD-10-CM

## 2024-02-06 DIAGNOSIS — Z99.81 CHRONIC HYPOXIC RESPIRATORY FAILURE, ON HOME OXYGEN THERAPY (HCC): ICD-10-CM

## 2024-02-06 LAB
IGA SERPL-MCNC: 86 MG/DL (ref 70–400)
IGG SERPL-MCNC: 630 MG/DL (ref 700–1600)
IGM SERPL-MCNC: 30 MG/DL (ref 40–230)

## 2024-02-06 PROCEDURE — 1090F PRES/ABSN URINE INCON ASSESS: CPT | Performed by: INTERNAL MEDICINE

## 2024-02-06 PROCEDURE — 1124F ACP DISCUSS-NO DSCNMKR DOCD: CPT | Performed by: INTERNAL MEDICINE

## 2024-02-06 PROCEDURE — G8417 CALC BMI ABV UP PARAM F/U: HCPCS | Performed by: INTERNAL MEDICINE

## 2024-02-06 PROCEDURE — 3017F COLORECTAL CA SCREEN DOC REV: CPT | Performed by: INTERNAL MEDICINE

## 2024-02-06 PROCEDURE — 3078F DIAST BP <80 MM HG: CPT | Performed by: INTERNAL MEDICINE

## 2024-02-06 PROCEDURE — G8400 PT W/DXA NO RESULTS DOC: HCPCS | Performed by: INTERNAL MEDICINE

## 2024-02-06 PROCEDURE — 99203 OFFICE O/P NEW LOW 30 MIN: CPT | Performed by: INTERNAL MEDICINE

## 2024-02-06 PROCEDURE — G8484 FLU IMMUNIZE NO ADMIN: HCPCS | Performed by: INTERNAL MEDICINE

## 2024-02-06 PROCEDURE — 1036F TOBACCO NON-USER: CPT | Performed by: INTERNAL MEDICINE

## 2024-02-06 PROCEDURE — 3074F SYST BP LT 130 MM HG: CPT | Performed by: INTERNAL MEDICINE

## 2024-02-06 PROCEDURE — G8427 DOCREV CUR MEDS BY ELIG CLIN: HCPCS | Performed by: INTERNAL MEDICINE

## 2024-02-06 PROCEDURE — 3023F SPIROM DOC REV: CPT | Performed by: INTERNAL MEDICINE

## 2024-02-06 NOTE — PROGRESS NOTES
Socioeconomic History    Marital status:      Spouse name: Not on file    Number of children: Not on file    Years of education: Not on file    Highest education level: Not on file   Occupational History    Not on file   Tobacco Use    Smoking status: Former     Current packs/day: 0.00     Average packs/day: 1 pack/day for 14.0 years (14.0 ttl pk-yrs)     Types: Cigarettes     Start date: 7/10/1970     Quit date: 7/10/1984     Years since quittin.6    Smokeless tobacco: Never    Tobacco comments:     quit cold turkey   Vaping Use    Vaping Use: Never used   Substance and Sexual Activity    Alcohol use: Yes    Drug use: Never    Sexual activity: Not on file   Other Topics Concern    Not on file   Social History Narrative    Not on file     Social Determinants of Health     Financial Resource Strain: Not on file   Food Insecurity: Not on file   Transportation Needs: Not on file   Physical Activity: Not on file   Stress: Not on file   Social Connections: Not on file   Intimate Partner Violence: Not on file   Housing Stability: Not on file     Family History:     Family History   Problem Relation Age of Onset    Heart Disease Mother     High Blood Pressure Mother     COPD Father     Heart Disease Father     High Blood Pressure Father     Cancer Father         lung      Allergies:   Morphine, Amoxicillin, Clavulanic acid, Amitriptyline, Amoxicillin-pot clavulanate, Aspirin, Capsaicin-menthol-methyl sal, Eggs or egg-derived products, Nitrofurantoin monohyd macro, and Nsaids     Review of Systems:   Review of Systems   Constitutional: Negative.    Respiratory: Negative.     Cardiovascular: Negative.    Gastrointestinal: Negative.    Genitourinary: Negative.    Musculoskeletal: Negative.    Skin: Negative.    Neurological: Negative.    Psychiatric/Behavioral: Negative.          Physical Examination :   /63 (Site: Left Lower Arm, Position: Sitting, Cuff Size: Medium Adult)   Pulse 93   Temp 97.8 °F

## 2024-02-07 LAB
CD3+CD4+ CELLS # BLD: 632 /UL (ref 309–1571)
CD3+CD4+ CELLS NFR BLD: 52 % (ref 27–64)
LYMPHOCYTES # BLD: 19 % (ref 24–44)
WBC # BLD: 6.4 K/UL (ref 3.5–11)

## 2024-02-09 ENCOUNTER — TELEPHONE (OUTPATIENT)
Dept: INFECTIOUS DISEASES | Age: 69
End: 2024-02-09

## 2024-02-09 ENCOUNTER — PATIENT MESSAGE (OUTPATIENT)
Dept: INFECTIOUS DISEASES | Age: 69
End: 2024-02-09

## 2024-02-09 NOTE — TELEPHONE ENCOUNTER
Patient called and stated that you were going to call her wit her lab results and she is getting kind of anxious waiting on results.  Can you please look over them and call patient or advise

## 2024-02-12 NOTE — TELEPHONE ENCOUNTER
From: Thu Kaur  To: Dr. Chelsi Loaiza  Sent: 2/9/2024 4:44 PM EST  Subject: Test results     Was wondering if you got a chance to look at them And if I could please get results Thank you

## 2024-02-14 NOTE — TELEPHONE ENCOUNTER
Dr Loaiza spoke to the patient regarding her test results. She left a message on [employer] 2/13 at 2:27pm stating she has an appointment with an allergist Thursday and would like her blood work sent there.     Writer called patient, provided her the number to medical records as we didn't refer the patient to the allergist.

## 2024-02-21 ENCOUNTER — TELEPHONE (OUTPATIENT)
Dept: PULMONOLOGY | Age: 69
End: 2024-02-21

## 2024-02-21 NOTE — TELEPHONE ENCOUNTER
Pts allergist is wanting to put patient on Tezspira, to help coat her lungs. Are you ok with Allergist starting patient on this. Please advise    Next ov: 05/01/2024

## 2024-02-25 DIAGNOSIS — J44.9 STAGE 2 MODERATE COPD BY GOLD CLASSIFICATION (HCC): ICD-10-CM

## 2024-02-25 DIAGNOSIS — Z99.81 CHRONIC RESPIRATORY FAILURE WITH HYPOXIA, ON HOME OXYGEN THERAPY (HCC): ICD-10-CM

## 2024-02-25 DIAGNOSIS — J96.11 CHRONIC RESPIRATORY FAILURE WITH HYPOXIA, ON HOME OXYGEN THERAPY (HCC): ICD-10-CM

## 2024-02-26 RX ORDER — IPRATROPIUM BROMIDE AND ALBUTEROL SULFATE 2.5; .5 MG/3ML; MG/3ML
SOLUTION RESPIRATORY (INHALATION)
Qty: 360 ML | Refills: 11 | Status: SHIPPED | OUTPATIENT
Start: 2024-02-26

## 2024-03-05 DIAGNOSIS — D50.0 IRON DEFICIENCY ANEMIA DUE TO CHRONIC BLOOD LOSS: Primary | ICD-10-CM

## 2024-03-06 ENCOUNTER — HOSPITAL ENCOUNTER (OUTPATIENT)
Age: 69
Discharge: HOME | End: 2024-03-06
Payer: MEDICARE

## 2024-03-06 DIAGNOSIS — D50.0 IRON DEFICIENCY ANEMIA DUE TO CHRONIC BLOOD LOSS: ICD-10-CM

## 2024-03-06 LAB
BASOPHILS NFR BLD: 0 % (ref 0–2)
EOSINOPHILS RELATIVE PERCENT: 1 % (ref 1–4)
ERYTHROCYTE [DISTWIDTH] IN BLOOD BY AUTOMATED COUNT: 14.3 % (ref 11.8–14.4)
FERRITIN SERPL-MCNC: 35 NG/ML (ref 13–150)
HCT VFR BLD AUTO: 40.4 % (ref 36.3–47.1)
HGB BLD-MCNC: 13 G/DL (ref 11.9–15.1)
IMM GRANULOCYTES NFR BLD: 0 %
IRON SATN MFR SERPL: 24 % (ref 20–55)
IRON SERPL-MCNC: 89 UG/DL (ref 37–145)
LYMPHOCYTES NFR BLD: 2.3 K/UL (ref 1.1–3.7)
LYMPHOCYTES RELATIVE PERCENT: 31 % (ref 24–43)
MCH RBC QN AUTO: 31.3 PG (ref 25.2–33.5)
MCHC RBC AUTO-ENTMCNC: 32.2 G/DL (ref 25.2–33.5)
MCV RBC AUTO: 97.1 FL (ref 82.6–102.9)
MONOCYTES NFR BLD: 0.65 K/UL (ref 0.1–1.2)
NEUTROPHILS NFR BLD: 59 % (ref 36–65)
NEUTS SEG NFR BLD: 4.46 K/UL (ref 1.5–8.1)
NRBC BLD-RTO: 0 PER 100 WBC
RBC # BLD AUTO: 4.16 M/UL (ref 3.95–5.11)
TIBC SERPL-MCNC: 371 UG/DL (ref 250–450)
WBC OTHER # BLD: 7.5 K/UL (ref 3.5–11.3)

## 2024-03-06 PROCEDURE — 85025 COMPLETE CBC W/AUTO DIFF WBC: CPT

## 2024-03-06 PROCEDURE — 82728 ASSAY OF FERRITIN: CPT

## 2024-03-06 PROCEDURE — 36415 COLL VENOUS BLD VENIPUNCTURE: CPT

## 2024-03-06 PROCEDURE — 83550 IRON BINDING TEST: CPT

## 2024-03-06 PROCEDURE — 83540 ASSAY OF IRON: CPT

## 2024-03-08 ENCOUNTER — OFFICE VISIT (OUTPATIENT)
Dept: PULMONOLOGY | Age: 69
End: 2024-03-08
Payer: MEDICARE

## 2024-03-08 VITALS
HEIGHT: 63 IN | TEMPERATURE: 97.3 F | OXYGEN SATURATION: 95 % | SYSTOLIC BLOOD PRESSURE: 97 MMHG | HEART RATE: 78 BPM | RESPIRATION RATE: 13 BRPM | BODY MASS INDEX: 37.92 KG/M2 | WEIGHT: 214 LBS | DIASTOLIC BLOOD PRESSURE: 56 MMHG

## 2024-03-08 DIAGNOSIS — Z99.81 CHRONIC RESPIRATORY FAILURE WITH HYPOXIA, ON HOME OXYGEN THERAPY (HCC): ICD-10-CM

## 2024-03-08 DIAGNOSIS — B33.8 RSV INFECTION: ICD-10-CM

## 2024-03-08 DIAGNOSIS — J10.1 INFLUENZA B: ICD-10-CM

## 2024-03-08 DIAGNOSIS — J96.11 CHRONIC RESPIRATORY FAILURE WITH HYPOXIA, ON HOME OXYGEN THERAPY (HCC): ICD-10-CM

## 2024-03-08 DIAGNOSIS — J44.9 STAGE 2 MODERATE COPD BY GOLD CLASSIFICATION (HCC): ICD-10-CM

## 2024-03-08 DIAGNOSIS — U07.1 COVID-19 VIRUS INFECTION: Primary | ICD-10-CM

## 2024-03-08 DIAGNOSIS — E66.01 CLASS 2 SEVERE OBESITY DUE TO EXCESS CALORIES WITH SERIOUS COMORBIDITY AND BODY MASS INDEX (BMI) OF 37.0 TO 37.9 IN ADULT (HCC): ICD-10-CM

## 2024-03-08 PROCEDURE — G8427 DOCREV CUR MEDS BY ELIG CLIN: HCPCS | Performed by: INTERNAL MEDICINE

## 2024-03-08 PROCEDURE — G8400 PT W/DXA NO RESULTS DOC: HCPCS | Performed by: INTERNAL MEDICINE

## 2024-03-08 PROCEDURE — G8417 CALC BMI ABV UP PARAM F/U: HCPCS | Performed by: INTERNAL MEDICINE

## 2024-03-08 PROCEDURE — 3078F DIAST BP <80 MM HG: CPT | Performed by: INTERNAL MEDICINE

## 2024-03-08 PROCEDURE — 1124F ACP DISCUSS-NO DSCNMKR DOCD: CPT | Performed by: INTERNAL MEDICINE

## 2024-03-08 PROCEDURE — 3023F SPIROM DOC REV: CPT | Performed by: INTERNAL MEDICINE

## 2024-03-08 PROCEDURE — 99213 OFFICE O/P EST LOW 20 MIN: CPT | Performed by: INTERNAL MEDICINE

## 2024-03-08 PROCEDURE — 3074F SYST BP LT 130 MM HG: CPT | Performed by: INTERNAL MEDICINE

## 2024-03-08 PROCEDURE — 1036F TOBACCO NON-USER: CPT | Performed by: INTERNAL MEDICINE

## 2024-03-08 PROCEDURE — G8484 FLU IMMUNIZE NO ADMIN: HCPCS | Performed by: INTERNAL MEDICINE

## 2024-03-08 PROCEDURE — 3017F COLORECTAL CA SCREEN DOC REV: CPT | Performed by: INTERNAL MEDICINE

## 2024-03-08 PROCEDURE — 1090F PRES/ABSN URINE INCON ASSESS: CPT | Performed by: INTERNAL MEDICINE

## 2024-03-08 RX ORDER — FEXOFENADINE HCL 180 MG/1
180 TABLET ORAL DAILY
COMMUNITY
Start: 2024-03-07 | End: 2024-09-03

## 2024-03-08 RX ORDER — CLINDAMYCIN HYDROCHLORIDE 300 MG/1
300 CAPSULE ORAL 3 TIMES DAILY
COMMUNITY
Start: 2024-03-07 | End: 2024-03-17

## 2024-03-08 ASSESSMENT — ENCOUNTER SYMPTOMS
SHORTNESS OF BREATH: 1
WHEEZING: 1
COUGH: 1
EYES NEGATIVE: 1
GASTROINTESTINAL NEGATIVE: 1

## 2024-03-08 ASSESSMENT — SLEEP AND FATIGUE QUESTIONNAIRES
HOW LIKELY ARE YOU TO NOD OFF OR FALL ASLEEP WHEN YOU ARE A PASSENGER IN A CAR FOR AN HOUR WITHOUT A BREAK: 1
HOW LIKELY ARE YOU TO NOD OFF OR FALL ASLEEP WHILE WATCHING TV: 2
HOW LIKELY ARE YOU TO NOD OFF OR FALL ASLEEP IN A CAR, WHILE STOPPED FOR A FEW MINUTES IN TRAFFIC: 0
HOW LIKELY ARE YOU TO NOD OFF OR FALL ASLEEP WHILE SITTING INACTIVE IN A PUBLIC PLACE: 0
HOW LIKELY ARE YOU TO NOD OFF OR FALL ASLEEP WHILE LYING DOWN TO REST IN THE AFTERNOON WHEN CIRCUMSTANCES PERMIT: 0
ESS TOTAL SCORE: 5
HOW LIKELY ARE YOU TO NOD OFF OR FALL ASLEEP WHILE SITTING AND TALKING TO SOMEONE: 0
HOW LIKELY ARE YOU TO NOD OFF OR FALL ASLEEP WHILE SITTING QUIETLY AFTER LUNCH WITHOUT ALCOHOL: 0
HOW LIKELY ARE YOU TO NOD OFF OR FALL ASLEEP WHILE SITTING AND READING: 2

## 2024-03-08 NOTE — PROGRESS NOTES
capsule by mouth daily      fluticasone (FLONASE) 50 MCG/ACT nasal spray 2 sprays by Each Nostril route daily       No current facility-administered medications for this visit.      Physical Exam  Vitals and nursing note reviewed.   Constitutional:       Appearance: She is well-developed. She is obese.   HENT:      Head: Normocephalic.      Nose: Nose normal. No congestion.      Mouth/Throat:      Mouth: Mucous membranes are moist.      Pharynx: Oropharynx is clear. No oropharyngeal exudate.   Eyes:      General: No scleral icterus.     Conjunctiva/sclera: Conjunctivae normal.   Neck:      Thyroid: No thyromegaly.      Vascular: No JVD.      Trachea: No tracheal deviation.   Cardiovascular:      Rate and Rhythm: Normal rate and regular rhythm.      Heart sounds: Normal heart sounds. No murmur heard.     No gallop.   Pulmonary:      Effort: Pulmonary effort is normal. No respiratory distress.      Breath sounds: No wheezing or rales.   Chest:      Chest wall: No tenderness.   Abdominal:      Palpations: Abdomen is soft.      Tenderness: There is no abdominal tenderness.   Musculoskeletal:      Cervical back: Neck supple.      Right lower leg: No edema.      Left lower leg: No edema.   Lymphadenopathy:      Cervical: No cervical adenopathy.   Skin:     General: Skin is warm and dry.   Neurological:      Mental Status: She is alert and oriented to person, place, and time.       Wt Readings from Last 3 Encounters:   03/08/24 97.1 kg (214 lb)   02/06/24 98.9 kg (218 lb)   11/13/23 102.2 kg (225 lb 3.2 oz)     Results for orders placed or performed during the hospital encounter of 03/06/24   Iron and TIBC   Result Value Ref Range    Iron 89 37 - 145 ug/dL    TIBC 371 250 - 450 ug/dL    Iron % Saturation 24 20 - 55 %    UIBC 282 112 - 347 ug/dL   CBC with Auto Differential   Result Value Ref Range    WBC 7.5 3.5 - 11.3 k/uL    RBC 4.16 3.95 - 5.11 m/uL    Hemoglobin 13.0 11.9 - 15.1 g/dL    Hematocrit 40.4 36.3 - 47.1 %

## 2024-03-11 ENCOUNTER — OFFICE VISIT (OUTPATIENT)
Dept: ONCOLOGY | Age: 69
End: 2024-03-11
Payer: MEDICARE

## 2024-03-11 VITALS
SYSTOLIC BLOOD PRESSURE: 106 MMHG | TEMPERATURE: 97.4 F | BODY MASS INDEX: 39.65 KG/M2 | RESPIRATION RATE: 18 BRPM | DIASTOLIC BLOOD PRESSURE: 74 MMHG | HEART RATE: 82 BPM | HEIGHT: 63 IN | OXYGEN SATURATION: 98 % | WEIGHT: 223.8 LBS

## 2024-03-11 DIAGNOSIS — D50.0 IRON DEFICIENCY ANEMIA DUE TO CHRONIC BLOOD LOSS: Primary | ICD-10-CM

## 2024-03-11 DIAGNOSIS — D80.1 HYPOGAMMAGLOBULINEMIA (HCC): ICD-10-CM

## 2024-03-11 PROCEDURE — G8417 CALC BMI ABV UP PARAM F/U: HCPCS | Performed by: INTERNAL MEDICINE

## 2024-03-11 PROCEDURE — G8484 FLU IMMUNIZE NO ADMIN: HCPCS | Performed by: INTERNAL MEDICINE

## 2024-03-11 PROCEDURE — 99214 OFFICE O/P EST MOD 30 MIN: CPT | Performed by: INTERNAL MEDICINE

## 2024-03-11 PROCEDURE — G8427 DOCREV CUR MEDS BY ELIG CLIN: HCPCS | Performed by: INTERNAL MEDICINE

## 2024-03-11 PROCEDURE — G8400 PT W/DXA NO RESULTS DOC: HCPCS | Performed by: INTERNAL MEDICINE

## 2024-03-11 PROCEDURE — 1090F PRES/ABSN URINE INCON ASSESS: CPT | Performed by: INTERNAL MEDICINE

## 2024-03-11 PROCEDURE — 1036F TOBACCO NON-USER: CPT | Performed by: INTERNAL MEDICINE

## 2024-03-11 PROCEDURE — 99213 OFFICE O/P EST LOW 20 MIN: CPT | Performed by: INTERNAL MEDICINE

## 2024-03-11 PROCEDURE — 3074F SYST BP LT 130 MM HG: CPT | Performed by: INTERNAL MEDICINE

## 2024-03-11 PROCEDURE — 3078F DIAST BP <80 MM HG: CPT | Performed by: INTERNAL MEDICINE

## 2024-03-11 PROCEDURE — 3017F COLORECTAL CA SCREEN DOC REV: CPT | Performed by: INTERNAL MEDICINE

## 2024-03-11 PROCEDURE — 1124F ACP DISCUSS-NO DSCNMKR DOCD: CPT | Performed by: INTERNAL MEDICINE

## 2024-03-11 RX ORDER — IPRATROPIUM BROMIDE 21 UG/1
SPRAY, METERED NASAL
COMMUNITY
Start: 2024-03-09

## 2024-03-11 NOTE — PROGRESS NOTES
not taking it regularly because of significant constipation    Hypogammaglobinemia: Recent IgG levels is showing low IgG at 630.  I explained that she has a very mild deficiency however she has multiple infections recently therefore may benefit from IVIG infusion.  I reviewed the risk benefits and side effects and she is agreeable.    I discussed the risk benefit and she is agreeable.   PLAN:   I reviewed the recent lab work, imaging studies, discussed diagnosis treatment recommendations   Recent hemoglobin and iron studies are within normal limits   Continue to monitor without any iron supplements   For her hypogammaglobinemia and recent recurrent infection, will arrange for IVIG infusion   I reviewed the risk benefits and side effects with patient and she is agreeable   Return to clinic in about 4 to 5 weeks with labs prior       Neil Wakefield MD      This note is created with the assistance of a speech recognition program.  While intending to generate a document that actually reflects the content of the visit, the document can still have some errors including those of syntax and sound a like substitutions which may escape proof reading.  It such instances, actual meaning can be extrapolated by contextual diversion.

## 2024-03-18 ENCOUNTER — HOSPITAL ENCOUNTER (OUTPATIENT)
Age: 69
Discharge: HOME | End: 2024-03-18
Payer: MEDICARE

## 2024-03-18 DIAGNOSIS — D80.1 HYPOGAMMAGLOBULINEMIA (HCC): ICD-10-CM

## 2024-03-18 LAB
BASOPHILS # BLD: 0.04 K/UL (ref 0–0.2)
BASOPHILS NFR BLD: 1 % (ref 0–2)
EOSINOPHIL # BLD: 0.08 K/UL (ref 0–0.44)
EOSINOPHILS RELATIVE PERCENT: 1 % (ref 1–4)
ERYTHROCYTE [DISTWIDTH] IN BLOOD BY AUTOMATED COUNT: 14.1 % (ref 11.8–14.4)
FREE KAPPA/LAMBDA RATIO: 1.26 (ref 0.22–1.74)
HCT VFR BLD AUTO: 41 % (ref 36.3–47.1)
HGB BLD-MCNC: 13 G/DL (ref 11.9–15.1)
IGA SERPL-MCNC: 81 MG/DL (ref 70–400)
IGG SERPL-MCNC: 665 MG/DL (ref 700–1600)
IGM SERPL-MCNC: 35 MG/DL (ref 40–230)
IMM GRANULOCYTES # BLD AUTO: <0.03 K/UL (ref 0–0.3)
IMM GRANULOCYTES NFR BLD: 0 %
KAPPA LC FREE SER-MCNC: 29 MG/L
LAMBDA LC FREE SERPL-MCNC: 23 MG/L (ref 4.2–27.7)
LYMPHOCYTES NFR BLD: 1.67 K/UL (ref 1.1–3.7)
LYMPHOCYTES RELATIVE PERCENT: 24 % (ref 24–43)
MCH RBC QN AUTO: 31.3 PG (ref 25.2–33.5)
MCHC RBC AUTO-ENTMCNC: 31.7 G/DL (ref 25.2–33.5)
MCV RBC AUTO: 98.6 FL (ref 82.6–102.9)
MONOCYTES NFR BLD: 0.47 K/UL (ref 0.1–1.2)
MONOCYTES NFR BLD: 7 % (ref 3–12)
NEUTROPHILS NFR BLD: 67 % (ref 36–65)
NEUTS SEG NFR BLD: 4.64 K/UL (ref 1.5–8.1)
NRBC BLD-RTO: 0 PER 100 WBC
PLATELET # BLD AUTO: 336 K/UL (ref 138–453)
PMV BLD AUTO: 9.3 FL (ref 8.1–13.5)
RBC # BLD AUTO: 4.16 M/UL (ref 3.95–5.11)
WBC OTHER # BLD: 6.9 K/UL (ref 3.5–11.3)

## 2024-03-18 PROCEDURE — 83521 IG LIGHT CHAINS FREE EACH: CPT

## 2024-03-18 PROCEDURE — 84165 PROTEIN E-PHORESIS SERUM: CPT

## 2024-03-18 PROCEDURE — 85025 COMPLETE CBC W/AUTO DIFF WBC: CPT

## 2024-03-18 PROCEDURE — 86334 IMMUNOFIX E-PHORESIS SERUM: CPT

## 2024-03-18 PROCEDURE — 82784 ASSAY IGA/IGD/IGG/IGM EACH: CPT

## 2024-03-18 PROCEDURE — 36415 COLL VENOUS BLD VENIPUNCTURE: CPT

## 2024-03-18 PROCEDURE — 84155 ASSAY OF PROTEIN SERUM: CPT

## 2024-03-20 LAB
ALBUMIN PERCENT: 64 % (ref 45–65)
ALBUMIN SERPL-MCNC: 4.2 G/DL (ref 3.2–5.2)
ALPHA 2 PERCENT: 14 % (ref 6–13)
ALPHA1 GLOB SERPL ELPH-MCNC: 0.2 G/DL (ref 0.1–0.4)
ALPHA1 GLOB SERPL ELPH-MCNC: 3 % (ref 3–6)
ALPHA2 GLOB SERPL ELPH-MCNC: 0.9 G/DL (ref 0.5–0.9)
B-GLOBULIN SERPL ELPH-MCNC: 0.8 G/DL (ref 0.5–1.1)
B-GLOBULIN SERPL ELPH-MCNC: 12 % (ref 11–19)
GAMMA GLOB SERPL ELPH-MCNC: 0.5 G/DL (ref 0.5–1.5)
GAMMA GLOBULIN %: 8 % (ref 9–20)
INTERPRETATION SERPL IFE-IMP: NORMAL
PATH REV: NORMAL
PATHOLOGIST: ABNORMAL
PROT PATTERN SERPL ELPH-IMP: ABNORMAL
PROT SERPL-MCNC: 6.6 G/DL (ref 6.6–8.7)
TOTAL PROT. SUM,%: 101 % (ref 98–102)
TOTAL PROT. SUM: 6.6 G/DL (ref 6.3–8.2)

## 2024-03-22 ENCOUNTER — TELEPHONE (OUTPATIENT)
Dept: INFUSION THERAPY | Age: 69
End: 2024-03-22

## 2024-03-22 NOTE — TELEPHONE ENCOUNTER
Thu called and asked if she needed to have the IVIG. She states she was 630 and is now up to 665 in her Igg lab. She would rather not get the IVIG if she does not need it. Dr. Wakefield states if having recurring infection she may benefit from IVIG if not then okay to skip.  Patient states she is feeling great and not having any issues and would like to skip Mondays IVIG.  She will get her labs drawn in a month and make sure IVIG continues to trend up.

## 2024-04-18 RX ORDER — ALBUTEROL SULFATE 2.5 MG/3ML
SOLUTION RESPIRATORY (INHALATION)
Qty: 360 ML | Refills: 3 | Status: SHIPPED | OUTPATIENT
Start: 2024-04-18

## 2024-04-24 DIAGNOSIS — D80.1 HYPOGAMMAGLOBULINEMIA (HCC): Primary | ICD-10-CM

## 2024-04-25 ENCOUNTER — HOSPITAL ENCOUNTER (OUTPATIENT)
Age: 69
Discharge: HOME | End: 2024-04-25
Payer: MEDICARE

## 2024-04-25 ENCOUNTER — TELEPHONE (OUTPATIENT)
Dept: INFUSION THERAPY | Age: 69
End: 2024-04-25

## 2024-04-25 ENCOUNTER — OFFICE VISIT (OUTPATIENT)
Dept: PAIN MANAGEMENT | Age: 69
End: 2024-04-25
Payer: MEDICARE

## 2024-04-25 ENCOUNTER — TELEPHONE (OUTPATIENT)
Dept: PAIN MANAGEMENT | Age: 69
End: 2024-04-25

## 2024-04-25 VITALS
SYSTOLIC BLOOD PRESSURE: 141 MMHG | OXYGEN SATURATION: 94 % | HEART RATE: 77 BPM | RESPIRATION RATE: 18 BRPM | BODY MASS INDEX: 40.22 KG/M2 | HEIGHT: 63 IN | WEIGHT: 227 LBS | DIASTOLIC BLOOD PRESSURE: 69 MMHG

## 2024-04-25 DIAGNOSIS — M51.36 DEGENERATION OF LUMBAR INTERVERTEBRAL DISC: ICD-10-CM

## 2024-04-25 DIAGNOSIS — M54.16 LUMBAR RADICULOPATHY: ICD-10-CM

## 2024-04-25 DIAGNOSIS — M17.11 PRIMARY OSTEOARTHRITIS OF RIGHT KNEE: Primary | ICD-10-CM

## 2024-04-25 DIAGNOSIS — D80.1 HYPOGAMMAGLOBULINEMIA (HCC): ICD-10-CM

## 2024-04-25 LAB
ALBUMIN SERPL-MCNC: 4.2 G/DL (ref 3.5–5.2)
ALBUMIN/GLOB SERPL: 1.6 {RATIO} (ref 1–2.5)
ALP SERPL-CCNC: 101 U/L (ref 35–104)
ALT SERPL-CCNC: 17 U/L (ref 5–33)
ANION GAP SERPL CALCULATED.3IONS-SCNC: 13 MMOL/L (ref 9–17)
AST SERPL-CCNC: 15 U/L
BASOPHILS # BLD: 0.04 K/UL (ref 0–0.2)
BASOPHILS NFR BLD: 1 % (ref 0–2)
BILIRUB SERPL-MCNC: 1 MG/DL (ref 0.3–1.2)
BUN SERPL-MCNC: 14 MG/DL (ref 8–23)
BUN/CREAT SERPL: 23 (ref 9–20)
CALCIUM SERPL-MCNC: 9.6 MG/DL (ref 8.6–10.4)
CHLORIDE SERPL-SCNC: 103 MMOL/L (ref 98–107)
CO2 SERPL-SCNC: 26 MMOL/L (ref 20–31)
CREAT SERPL-MCNC: 0.6 MG/DL (ref 0.5–0.9)
EOSINOPHIL # BLD: 0.07 K/UL (ref 0–0.44)
EOSINOPHILS RELATIVE PERCENT: 1 % (ref 1–4)
ERYTHROCYTE [DISTWIDTH] IN BLOOD BY AUTOMATED COUNT: 13.8 % (ref 11.8–14.4)
FREE KAPPA/LAMBDA RATIO: 1.29 (ref 0.22–1.74)
GFR SERPL CREATININE-BSD FRML MDRD: >90 ML/MIN/1.73M2
GLUCOSE SERPL-MCNC: 106 MG/DL (ref 70–99)
HCT VFR BLD AUTO: 40.1 % (ref 36.3–47.1)
HGB BLD-MCNC: 12.9 G/DL (ref 11.9–15.1)
IGA SERPL-MCNC: 93 MG/DL (ref 70–400)
IGG SERPL-MCNC: 670 MG/DL (ref 700–1600)
IGM SERPL-MCNC: 32 MG/DL (ref 40–230)
IMM GRANULOCYTES # BLD AUTO: <0.03 K/UL (ref 0–0.3)
IMM GRANULOCYTES NFR BLD: 0 %
KAPPA LC FREE SER-MCNC: 30.5 MG/L
LAMBDA LC FREE SERPL-MCNC: 23.7 MG/L (ref 4.2–27.7)
LYMPHOCYTES NFR BLD: 1.38 K/UL (ref 1.1–3.7)
LYMPHOCYTES RELATIVE PERCENT: 25 % (ref 24–43)
MCH RBC QN AUTO: 31 PG (ref 25.2–33.5)
MCHC RBC AUTO-ENTMCNC: 32.2 G/DL (ref 25.2–33.5)
MCV RBC AUTO: 96.4 FL (ref 82.6–102.9)
MONOCYTES NFR BLD: 0.45 K/UL (ref 0.1–1.2)
MONOCYTES NFR BLD: 8 % (ref 3–12)
NEUTROPHILS NFR BLD: 65 % (ref 36–65)
NEUTS SEG NFR BLD: 3.64 K/UL (ref 1.5–8.1)
NRBC BLD-RTO: 0 PER 100 WBC
PLATELET # BLD AUTO: 333 K/UL (ref 138–453)
PMV BLD AUTO: 9.3 FL (ref 8.1–13.5)
POTASSIUM SERPL-SCNC: 4.5 MMOL/L (ref 3.7–5.3)
PROT SERPL-MCNC: 6.9 G/DL (ref 6.4–8.3)
RBC # BLD AUTO: 4.16 M/UL (ref 3.95–5.11)
SODIUM SERPL-SCNC: 142 MMOL/L (ref 135–144)
WBC OTHER # BLD: 5.6 K/UL (ref 3.5–11.3)

## 2024-04-25 PROCEDURE — G8400 PT W/DXA NO RESULTS DOC: HCPCS | Performed by: NURSE PRACTITIONER

## 2024-04-25 PROCEDURE — G8427 DOCREV CUR MEDS BY ELIG CLIN: HCPCS | Performed by: NURSE PRACTITIONER

## 2024-04-25 PROCEDURE — 3078F DIAST BP <80 MM HG: CPT | Performed by: NURSE PRACTITIONER

## 2024-04-25 PROCEDURE — 82784 ASSAY IGA/IGD/IGG/IGM EACH: CPT

## 2024-04-25 PROCEDURE — 99214 OFFICE O/P EST MOD 30 MIN: CPT | Performed by: NURSE PRACTITIONER

## 2024-04-25 PROCEDURE — 84165 PROTEIN E-PHORESIS SERUM: CPT

## 2024-04-25 PROCEDURE — 1036F TOBACCO NON-USER: CPT | Performed by: NURSE PRACTITIONER

## 2024-04-25 PROCEDURE — 3077F SYST BP >= 140 MM HG: CPT | Performed by: NURSE PRACTITIONER

## 2024-04-25 PROCEDURE — 85025 COMPLETE CBC W/AUTO DIFF WBC: CPT

## 2024-04-25 PROCEDURE — 80053 COMPREHEN METABOLIC PANEL: CPT

## 2024-04-25 PROCEDURE — 83521 IG LIGHT CHAINS FREE EACH: CPT

## 2024-04-25 PROCEDURE — 84155 ASSAY OF PROTEIN SERUM: CPT

## 2024-04-25 PROCEDURE — 86334 IMMUNOFIX E-PHORESIS SERUM: CPT

## 2024-04-25 PROCEDURE — 1090F PRES/ABSN URINE INCON ASSESS: CPT | Performed by: NURSE PRACTITIONER

## 2024-04-25 PROCEDURE — 36415 COLL VENOUS BLD VENIPUNCTURE: CPT

## 2024-04-25 PROCEDURE — G8417 CALC BMI ABV UP PARAM F/U: HCPCS | Performed by: NURSE PRACTITIONER

## 2024-04-25 PROCEDURE — 3017F COLORECTAL CA SCREEN DOC REV: CPT | Performed by: NURSE PRACTITIONER

## 2024-04-25 PROCEDURE — 1124F ACP DISCUSS-NO DSCNMKR DOCD: CPT | Performed by: NURSE PRACTITIONER

## 2024-04-25 RX ORDER — HYDROCORTISONE 25 MG/G
CREAM TOPICAL
COMMUNITY
Start: 2024-03-21

## 2024-04-25 RX ORDER — LIDOCAINE 50 MG/G
1 PATCH TOPICAL DAILY
Qty: 30 PATCH | Refills: 5 | Status: SHIPPED | OUTPATIENT
Start: 2024-04-25

## 2024-04-25 ASSESSMENT — ENCOUNTER SYMPTOMS
SHORTNESS OF BREATH: 0
SORE THROAT: 0
DIARRHEA: 1
BACK PAIN: 1

## 2024-04-25 NOTE — PROGRESS NOTES
Subjective:      Patient ID: Thu Kaur is a 69 y.o. female.  Chief Complaint   Patient presents with    Knee Pain     Right      Back Pain  Pertinent negatives include no chest pain, fever, numbness or weakness.   Hip Pain   Pertinent negatives include no numbness.   Knee Pain   Pertinent negatives include no numbness.      Right knee pain    Pain Assessment  Location of Pain: Knee  Location Modifiers: Right  Severity of Pain: 9  Quality of Pain: Grinding, Popping, Cracking, Sharp, Aching, Throbbing  Duration of Pain: Persistent  Frequency of Pain: Constant  Aggravating Factors: Stairs, Walking, Standing, Squatting, Kneeling, Exercise, Straightening, Stretching, Bending  Limiting Behavior: Yes  Relieving Factors: Rest, Nsaids  Result of Injury: No  Work-Related Injury: No  Are there other pain locations you wish to document?: No    Allergies   Allergen Reactions    Morphine Hives    Amoxicillin Other (See Comments)    Clavulanic Acid Other (See Comments)    Amitriptyline Nausea Only    Amoxicillin-Pot Clavulanate Nausea Only and Other (See Comments)    Aspirin Other (See Comments)     Sensitive \"gets sick\"    Capsaicin-Menthol-Methyl Sal Dermatitis    Egg-Derived Products Other (See Comments)    Nitrofurantoin Monohyd Macro Nausea Only    Nsaids Nausea Only       Outpatient Medications Marked as Taking for the 4/25/24 encounter (Office Visit) with Camille Waters, DIAMOND - CNP   Medication Sig Dispense Refill    acetylcysteine 600 MG CAPS Take by mouth      PROCTO-MED HC 2.5 % CREA rectal cream APPLY TO THE AFFECTED AREA(S) rectally FOUR times daily      diclofenac sodium (VOLTAREN) 1 % GEL Apply 4 g topically 4 times daily 350 g 5    lidocaine (LIDODERM) 5 % Place 1 patch onto the skin daily 12 hours on, 12 hours off. 30 patch 5    albuterol (PROVENTIL) (2.5 MG/3ML) 0.083% nebulizer solution INHALE CONTENTS OF 1 VIAL BY NEBULIZATION THREE TIMES DAILY 360 mL 3    vitamin D (CHOLECALCIFEROL) 50 MCG (2000 UT) CAPS

## 2024-04-25 NOTE — TELEPHONE ENCOUNTER
Call from [facility] ([phone]) inquiring if Dr. Wakefield would approve home infusions of Gamunex. Provider notified.

## 2024-04-27 LAB
ALBUMIN PERCENT: 62 % (ref 45–65)
ALBUMIN SERPL-MCNC: 4.2 G/DL (ref 3.2–5.2)
ALPHA 2 PERCENT: 14 % (ref 6–13)
ALPHA1 GLOB SERPL ELPH-MCNC: 0.2 G/DL (ref 0.1–0.4)
ALPHA1 GLOB SERPL ELPH-MCNC: 3 % (ref 3–6)
ALPHA2 GLOB SERPL ELPH-MCNC: 0.9 G/DL (ref 0.5–0.9)
B-GLOBULIN SERPL ELPH-MCNC: 0.8 G/DL (ref 0.7–1.4)
B-GLOBULIN SERPL ELPH-MCNC: 12 % (ref 11–19)
GAMMA GLOB SERPL ELPH-MCNC: 0.6 G/DL (ref 0.5–1.5)
GAMMA GLOBULIN %: 8 % (ref 9–20)
INTERPRETATION SERPL IFE-IMP: NORMAL
PATH REV: NORMAL
PATHOLOGIST: ABNORMAL
PROT PATTERN SERPL ELPH-IMP: ABNORMAL
PROT SERPL-MCNC: 6.8 G/DL (ref 6.6–8.7)
TOTAL PROT. SUM,%: 99 % (ref 98–102)
TOTAL PROT. SUM: 6.7 G/DL (ref 6.3–8.2)

## 2024-04-29 ENCOUNTER — OFFICE VISIT (OUTPATIENT)
Dept: ONCOLOGY | Age: 69
End: 2024-04-29
Payer: MEDICARE

## 2024-04-29 VITALS
HEART RATE: 82 BPM | SYSTOLIC BLOOD PRESSURE: 138 MMHG | HEIGHT: 63 IN | OXYGEN SATURATION: 98 % | DIASTOLIC BLOOD PRESSURE: 70 MMHG | RESPIRATION RATE: 12 BRPM | BODY MASS INDEX: 39.69 KG/M2 | WEIGHT: 224 LBS | TEMPERATURE: 97.3 F

## 2024-04-29 DIAGNOSIS — D47.2 MGUS (MONOCLONAL GAMMOPATHY OF UNKNOWN SIGNIFICANCE): ICD-10-CM

## 2024-04-29 DIAGNOSIS — D80.1 HYPOGAMMAGLOBULINEMIA (HCC): Primary | ICD-10-CM

## 2024-04-29 PROCEDURE — G8400 PT W/DXA NO RESULTS DOC: HCPCS | Performed by: INTERNAL MEDICINE

## 2024-04-29 PROCEDURE — G8417 CALC BMI ABV UP PARAM F/U: HCPCS | Performed by: INTERNAL MEDICINE

## 2024-04-29 PROCEDURE — 3075F SYST BP GE 130 - 139MM HG: CPT | Performed by: INTERNAL MEDICINE

## 2024-04-29 PROCEDURE — 1090F PRES/ABSN URINE INCON ASSESS: CPT | Performed by: INTERNAL MEDICINE

## 2024-04-29 PROCEDURE — 3017F COLORECTAL CA SCREEN DOC REV: CPT | Performed by: INTERNAL MEDICINE

## 2024-04-29 PROCEDURE — 1036F TOBACCO NON-USER: CPT | Performed by: INTERNAL MEDICINE

## 2024-04-29 PROCEDURE — 1124F ACP DISCUSS-NO DSCNMKR DOCD: CPT | Performed by: INTERNAL MEDICINE

## 2024-04-29 PROCEDURE — 99213 OFFICE O/P EST LOW 20 MIN: CPT | Performed by: INTERNAL MEDICINE

## 2024-04-29 PROCEDURE — 3078F DIAST BP <80 MM HG: CPT | Performed by: INTERNAL MEDICINE

## 2024-04-29 PROCEDURE — 99214 OFFICE O/P EST MOD 30 MIN: CPT | Performed by: INTERNAL MEDICINE

## 2024-04-29 PROCEDURE — G8427 DOCREV CUR MEDS BY ELIG CLIN: HCPCS | Performed by: INTERNAL MEDICINE

## 2024-04-29 NOTE — PROGRESS NOTES
Patient ID: Thu Kaur, 1955, 2916, 69 y.o.  Referred by : Doug Sotelo MD  Reason for consultation:   Anemia  Iron deficiency status post IV iron infusion  Currently on 1 iron pill daily  MGUS  Hypogammaglobinemia  HISTORY OF PRESENT ILLNESS:    Hematologic history:  Mine is a 65-year-old female with history of chronic anemia was seen there initial consultation visit.  She reports that she has history of anemia for past several years and for past 2 years she has been receiving intermittent PRBC and iron transfusions from her primary care physician.  She reports her last transfusion was about in August 2019 prior to her vascular surgery.  She reports she has taken iron pills in the past but had significant GI side effects so she had stopped.  She reports history of IBS and also had upper endoscopy and colonoscopy in 2018 was negative for any active bleeding.  Her GI evaluation was done at [facility].  Most recently her stool for occult blood was negative checked in March of this year.  Her CBC on 3/18/2020 showed hemoglobin of 9.9, WBC 10.2, platelets 431.  Her ferritin 13.3, iron 15, TIBC 366 and iron saturation 4% noted on 3/18/2020.    INTERVAL HISTORY:  Patient is return for follow visit and to discuss lab results and further questions.  He reports that 3 days ago she went to see her PCP for sore throat and was diagnosed with flu as well as COVID-19 infection.    Recent lab work showed stable monoclonal protein.  IgG level was slightly improved at 670     She does have some sore throat but denies any fever or chills.  She has mild cough.        During this visit patient's allergy, social, medical, surgical history and medications were reviewed and updated.    Past Medical History:   Diagnosis Date    Adjustment disorder with mixed emotional features 02/29/2020    Anemia 02/29/2020    Anxiety 02/29/2020    Asthma 03/03/2009    Chronic airway obstruction (HCC)     Chronic idiopathic constipation

## 2024-05-02 NOTE — TELEPHONE ENCOUNTER
Insurance will need to be called. [employer] keeps saying the request has been cancelled, twice now.

## 2024-05-03 NOTE — TELEPHONE ENCOUNTER
Approved # [id number] dates:  May 1, 2024 to Dec 31, 2024.     Scheduled for 5/17/24, 5/24/24, and 5/31/24.

## 2024-05-06 ENCOUNTER — TELEPHONE (OUTPATIENT)
Dept: PULMONOLOGY | Age: 69
End: 2024-05-06

## 2024-05-06 NOTE — TELEPHONE ENCOUNTER
Please review her most recent xray findings. She was recently diagnosed with the flu and covid. She would like to know your thoughts?

## 2024-05-07 NOTE — TELEPHONE ENCOUNTER
Unable to locate xray also, I looked in her chart and saw a scan on 4/22 but didn't realize its the knee.

## 2024-05-09 NOTE — TELEPHONE ENCOUNTER
Chest x-ray on 5/2/2024 at [facility] radiology report for a possible small left lower lobe infiltrate.  Poorly defined.        Is patient having any increased respiratory symptoms such as shortness of breath, cough with productivity, if coughing up sputum is or any color to it.  I have in her chart that she was positive for COVID on 4/26/2024.  She may be having residual shortness of breath and cough secondary to COVID infection.  Was she treated with Paxlovid?

## 2024-05-09 NOTE — TELEPHONE ENCOUNTER
Patient had recent chest xray done at [facility] on 5/2/24. Report in chart images are in PACS. Please advise, thanks.

## 2024-05-10 NOTE — TELEPHONE ENCOUNTER
Spoke with patient. She had another chest xray done 5/9/24. Report in chart, images in PACS. She states she is feeling better and is not coughing nearly as much. She was not treated with Paxlovid for Covid. She states her pcp told her she was not able to take it due to the inhalers she was on.

## 2024-05-15 RX ORDER — BENZONATATE 100 MG/1
100 CAPSULE ORAL 3 TIMES DAILY PRN
Qty: 90 CAPSULE | Refills: 0 | Status: SHIPPED | OUTPATIENT
Start: 2024-05-15

## 2024-05-17 ENCOUNTER — PROCEDURE VISIT (OUTPATIENT)
Dept: PAIN MANAGEMENT | Age: 69
End: 2024-05-17
Payer: MEDICARE

## 2024-05-17 VITALS
HEIGHT: 63 IN | HEART RATE: 72 BPM | OXYGEN SATURATION: 97 % | SYSTOLIC BLOOD PRESSURE: 132 MMHG | BODY MASS INDEX: 38.98 KG/M2 | DIASTOLIC BLOOD PRESSURE: 74 MMHG | WEIGHT: 220 LBS

## 2024-05-17 DIAGNOSIS — G89.4 CHRONIC PAIN SYNDROME: Primary | ICD-10-CM

## 2024-05-17 DIAGNOSIS — M47.816 LUMBAR FACET JOINT SYNDROME: ICD-10-CM

## 2024-05-17 DIAGNOSIS — M54.16 LUMBAR RADICULOPATHY: ICD-10-CM

## 2024-05-17 DIAGNOSIS — M17.11 PRIMARY LOCALIZED OSTEOARTHRITIS OF RIGHT KNEE: ICD-10-CM

## 2024-05-17 PROCEDURE — 20610 DRAIN/INJ JOINT/BURSA W/O US: CPT | Performed by: NURSE PRACTITIONER

## 2024-05-17 PROCEDURE — 99214 OFFICE O/P EST MOD 30 MIN: CPT | Performed by: NURSE PRACTITIONER

## 2024-05-17 RX ORDER — HYALURONATE SODIUM 10 MG/ML
20 SYRINGE (ML) INTRAARTICULAR ONCE
Status: COMPLETED | OUTPATIENT
Start: 2024-05-17 | End: 2024-05-17

## 2024-05-17 RX ADMIN — Medication 20 MG: at 10:55

## 2024-05-17 NOTE — PROGRESS NOTES
Thu Kaur  1955  5/17/24      PAIN MANAGEMENT CLINIC PROCEDURE NOTE    CHIEF COMPLAINT: This is a 69 y.o. female patient who presents to the Pain Management Clinic with a history of pain in the right knee(s).    PRE-PROCEDURE DIAGNOSIS: Right knee osteoarthritis      POST-PROCEDURE DIAGNOSIS: same as pre-procedure diagnosis    Vitals:    05/17/24 1000   BP: 132/74   Pulse: 72   SpO2: 97%       PROCEDURE:     The procedure was explained, including risks and benefits, and the patient has agreed to proceed. The injection site was marked on the patient’s skin. A large area around the injection site was cleaned with chlora-prep.    VISCO SUPPLEMENTATION INJECTION  A 22G 1.5 inch needle was inserted into the right knee(s) using a anterolateral approach. Depth and direction of the needle were monitored at all times. The syringe was aspirated and was negative for heme.  Then, 2 ml of synvisc/euflexxa was injected into the joint. Right: Exp date: 12/3/24, Lot  # [id number].       The injection site was cleaned and dressed with a spot band aid.  The patient tolerated the procedure well and with out complication The patient was instructed avoid heat and excessive activity for 24-48 hours.

## 2024-05-24 ENCOUNTER — PROCEDURE VISIT (OUTPATIENT)
Dept: PAIN MANAGEMENT | Age: 69
End: 2024-05-24
Payer: MEDICARE

## 2024-05-24 ENCOUNTER — HOSPITAL ENCOUNTER (OUTPATIENT)
Age: 69
Discharge: HOME | End: 2024-05-24
Payer: MEDICARE

## 2024-05-24 VITALS
BODY MASS INDEX: 37.73 KG/M2 | OXYGEN SATURATION: 97 % | WEIGHT: 221 LBS | HEIGHT: 64 IN | DIASTOLIC BLOOD PRESSURE: 76 MMHG | RESPIRATION RATE: 18 BRPM | SYSTOLIC BLOOD PRESSURE: 136 MMHG | HEART RATE: 76 BPM

## 2024-05-24 DIAGNOSIS — D47.2 MGUS (MONOCLONAL GAMMOPATHY OF UNKNOWN SIGNIFICANCE): ICD-10-CM

## 2024-05-24 DIAGNOSIS — M17.11 PRIMARY OSTEOARTHRITIS OF RIGHT KNEE: ICD-10-CM

## 2024-05-24 DIAGNOSIS — G89.4 CHRONIC PAIN SYNDROME: Primary | ICD-10-CM

## 2024-05-24 LAB
ALBUMIN PERCENT: ABNORMAL %
ALBUMIN SERPL-MCNC: 4.3 G/DL (ref 3.5–5.2)
ALBUMIN SERPL-MCNC: ABNORMAL G/DL
ALBUMIN/GLOB SERPL: 1.7 {RATIO} (ref 1–2.5)
ALP SERPL-CCNC: 112 U/L (ref 35–104)
ALPHA 2 PERCENT: ABNORMAL %
ALPHA1 GLOB SERPL ELPH-MCNC: ABNORMAL %
ALPHA1 GLOB SERPL ELPH-MCNC: ABNORMAL G/DL
ALPHA2 GLOB SERPL ELPH-MCNC: ABNORMAL G/DL
ALT SERPL-CCNC: 16 U/L (ref 5–33)
ANION GAP SERPL CALCULATED.3IONS-SCNC: 8 MMOL/L (ref 9–17)
AST SERPL-CCNC: 17 U/L
B-GLOBULIN SERPL ELPH-MCNC: ABNORMAL %
B-GLOBULIN SERPL ELPH-MCNC: ABNORMAL G/DL
BASOPHILS # BLD: 0.03 K/UL (ref 0–0.2)
BASOPHILS NFR BLD: 1 % (ref 0–2)
BILIRUB SERPL-MCNC: 0.7 MG/DL (ref 0.3–1.2)
BUN SERPL-MCNC: 16 MG/DL (ref 8–23)
BUN/CREAT SERPL: 27 (ref 9–20)
CALCIUM SERPL-MCNC: 9.2 MG/DL (ref 8.6–10.4)
CHLORIDE SERPL-SCNC: 106 MMOL/L (ref 98–107)
CO2 SERPL-SCNC: 27 MMOL/L (ref 20–31)
CREAT SERPL-MCNC: 0.6 MG/DL (ref 0.5–0.9)
EOSINOPHIL # BLD: 0.11 K/UL (ref 0–0.44)
EOSINOPHILS RELATIVE PERCENT: 2 % (ref 1–4)
ERYTHROCYTE [DISTWIDTH] IN BLOOD BY AUTOMATED COUNT: 14 % (ref 11.8–14.4)
FREE KAPPA/LAMBDA RATIO: 1.34 (ref 0.22–1.74)
GAMMA GLOB SERPL ELPH-MCNC: ABNORMAL G/DL
GAMMA GLOBULIN %: ABNORMAL %
GFR, ESTIMATED: >90 ML/MIN/1.73M2
GLUCOSE SERPL-MCNC: 118 MG/DL (ref 70–99)
HCT VFR BLD AUTO: 39 % (ref 36.3–47.1)
HGB BLD-MCNC: 12.7 G/DL (ref 11.9–15.1)
IGA SERPL-MCNC: 74 MG/DL (ref 70–400)
IGG SERPL-MCNC: 612 MG/DL (ref 700–1600)
IGM SERPL-MCNC: 35 MG/DL (ref 40–230)
IMM GRANULOCYTES # BLD AUTO: <0.03 K/UL (ref 0–0.3)
IMM GRANULOCYTES NFR BLD: 0 %
KAPPA LC FREE SER-MCNC: 27.8 MG/L
LAMBDA LC FREE SERPL-MCNC: 20.8 MG/L (ref 4.2–27.7)
LYMPHOCYTES NFR BLD: 1.6 K/UL (ref 1.1–3.7)
LYMPHOCYTES RELATIVE PERCENT: 32 % (ref 24–43)
M PROTEIN 2 SERPL ELPH-MCNC: ABNORMAL G/DL
M PROTEIN SERPL ELPH-MCNC: ABNORMAL G/DL
MCH RBC QN AUTO: 31.1 PG (ref 25.2–33.5)
MCHC RBC AUTO-ENTMCNC: 32.6 G/DL (ref 25.2–33.5)
MCV RBC AUTO: 95.6 FL (ref 82.6–102.9)
MONOCYTES NFR BLD: 0.43 K/UL (ref 0.1–1.2)
MONOCYTES NFR BLD: 9 % (ref 3–12)
NEUTROPHILS NFR BLD: 56 % (ref 36–65)
NEUTS SEG NFR BLD: 2.77 K/UL (ref 1.5–8.1)
NRBC BLD-RTO: 0 PER 100 WBC
PATHOLOGIST: ABNORMAL
PLATELET # BLD AUTO: 332 K/UL (ref 138–453)
PMV BLD AUTO: 9 FL (ref 8.1–13.5)
POTASSIUM SERPL-SCNC: 4.9 MMOL/L (ref 3.7–5.3)
PROT PATTERN SERPL ELPH-IMP: ABNORMAL
PROT SERPL-MCNC: 6.4 G/DL (ref 6.6–8.7)
PROT SERPL-MCNC: 6.8 G/DL (ref 6.4–8.3)
RBC # BLD AUTO: 4.08 M/UL (ref 3.95–5.11)
SODIUM SERPL-SCNC: 141 MMOL/L (ref 135–144)
TOTAL PROT. SUM,%: ABNORMAL %
TOTAL PROT. SUM: ABNORMAL G/DL
WBC OTHER # BLD: 5 K/UL (ref 3.5–11.3)

## 2024-05-24 PROCEDURE — 84165 PROTEIN E-PHORESIS SERUM: CPT

## 2024-05-24 PROCEDURE — 85025 COMPLETE CBC W/AUTO DIFF WBC: CPT

## 2024-05-24 PROCEDURE — 80053 COMPREHEN METABOLIC PANEL: CPT

## 2024-05-24 PROCEDURE — 82784 ASSAY IGA/IGD/IGG/IGM EACH: CPT

## 2024-05-24 PROCEDURE — 36415 COLL VENOUS BLD VENIPUNCTURE: CPT

## 2024-05-24 PROCEDURE — 20610 DRAIN/INJ JOINT/BURSA W/O US: CPT | Performed by: NURSE PRACTITIONER

## 2024-05-24 PROCEDURE — 86334 IMMUNOFIX E-PHORESIS SERUM: CPT

## 2024-05-24 PROCEDURE — 83521 IG LIGHT CHAINS FREE EACH: CPT

## 2024-05-24 PROCEDURE — 84155 ASSAY OF PROTEIN SERUM: CPT

## 2024-05-24 PROCEDURE — 99214 OFFICE O/P EST MOD 30 MIN: CPT | Performed by: NURSE PRACTITIONER

## 2024-05-24 RX ORDER — HYALURONATE SODIUM 10 MG/ML
20 SYRINGE (ML) INTRAARTICULAR ONCE
Status: COMPLETED | OUTPATIENT
Start: 2024-05-24 | End: 2024-05-24

## 2024-05-24 RX ADMIN — Medication 20 MG: at 10:08

## 2024-05-24 NOTE — PROGRESS NOTES
Thu Kaur  1955  5/17/24      PAIN MANAGEMENT CLINIC PROCEDURE NOTE    CHIEF COMPLAINT: This is a 69 y.o. female patient who presents to the Pain Management Clinic with a history of pain in the right knee(s).    PRE-PROCEDURE DIAGNOSIS: Right knee osteoarthritis      POST-PROCEDURE DIAGNOSIS: same as pre-procedure diagnosis    Vitals:    05/24/24 0826   BP: 136/76   Pulse: 76   Resp: 18   SpO2: 97%       PROCEDURE:     The procedure was explained, including risks and benefits, and the patient has agreed to proceed. The injection site was marked on the patient’s skin. A large area around the injection site was cleaned with chlora-prep.    VISCO SUPPLEMENTATION INJECTION  A 22G 1.5 inch needle was inserted into the right knee(s) using a anterolateral approach. Depth and direction of the needle were monitored at all times. The syringe was aspirated and was negative for heme.  Then, 2 ml of synvisc/euflexxa was injected into the joint. Right: Exp date: 12/3/24, Lot  # [id number].       The injection site was cleaned and dressed with a spot band aid.  The patient tolerated the procedure well and with out complication The patient was instructed avoid heat and excessive activity for 24-48 hours.

## 2024-05-28 LAB
ALBUMIN PERCENT: 61 % (ref 45–65)
ALBUMIN SERPL-MCNC: 3.9 G/DL (ref 3.2–5.2)
ALPHA 2 PERCENT: 13 % (ref 6–13)
ALPHA1 GLOB SERPL ELPH-MCNC: 0.3 G/DL (ref 0.1–0.4)
ALPHA1 GLOB SERPL ELPH-MCNC: 5 % (ref 3–6)
ALPHA2 GLOB SERPL ELPH-MCNC: 0.9 G/DL (ref 0.5–0.9)
B-GLOBULIN SERPL ELPH-MCNC: 0.8 G/DL (ref 0.5–1.1)
B-GLOBULIN SERPL ELPH-MCNC: 12 % (ref 11–19)
GAMMA GLOB SERPL ELPH-MCNC: 0.6 G/DL (ref 0.5–1.5)
GAMMA GLOBULIN %: 9 % (ref 9–20)
PATHOLOGIST: ABNORMAL
PROT PATTERN SERPL ELPH-IMP: ABNORMAL
PROT SERPL-MCNC: 6.4 G/DL (ref 6.6–8.7)
TOTAL PROT. SUM,%: 100 % (ref 98–102)
TOTAL PROT. SUM: 6.5 G/DL (ref 6.3–8.2)

## 2024-05-31 ENCOUNTER — PROCEDURE VISIT (OUTPATIENT)
Dept: PAIN MANAGEMENT | Age: 69
End: 2024-05-31
Payer: MEDICARE

## 2024-05-31 VITALS
OXYGEN SATURATION: 96 % | BODY MASS INDEX: 37.05 KG/M2 | RESPIRATION RATE: 17 BRPM | WEIGHT: 217 LBS | HEIGHT: 64 IN | HEART RATE: 83 BPM | DIASTOLIC BLOOD PRESSURE: 90 MMHG | SYSTOLIC BLOOD PRESSURE: 150 MMHG

## 2024-05-31 DIAGNOSIS — M17.11 PRIMARY OSTEOARTHRITIS OF RIGHT KNEE: ICD-10-CM

## 2024-05-31 DIAGNOSIS — M51.36 DEGENERATION OF LUMBAR INTERVERTEBRAL DISC: Primary | ICD-10-CM

## 2024-05-31 PROCEDURE — 99214 OFFICE O/P EST MOD 30 MIN: CPT | Performed by: NURSE PRACTITIONER

## 2024-05-31 PROCEDURE — 20610 DRAIN/INJ JOINT/BURSA W/O US: CPT | Performed by: NURSE PRACTITIONER

## 2024-05-31 RX ORDER — HYALURONATE SODIUM 10 MG/ML
20 SYRINGE (ML) INTRAARTICULAR ONCE
Status: COMPLETED | OUTPATIENT
Start: 2024-05-31 | End: 2024-05-31

## 2024-05-31 RX ADMIN — Medication 20 MG: at 15:01

## 2024-05-31 NOTE — PROGRESS NOTES
Thu Kaur  1955  5/17/24      PAIN MANAGEMENT CLINIC PROCEDURE NOTE    CHIEF COMPLAINT: This is a 69 y.o. female patient who presents to the Pain Management Clinic with a history of pain in the right knee(s).    PRE-PROCEDURE DIAGNOSIS: Right knee osteoarthritis      POST-PROCEDURE DIAGNOSIS: same as pre-procedure diagnosis    Vitals:    05/31/24 1352   BP: (!) 150/90   Pulse: 83   Resp: 17   SpO2: 96%       PROCEDURE:     The procedure was explained, including risks and benefits, and the patient has agreed to proceed. The injection site was marked on the patient’s skin. A large area around the injection site was cleaned with chlora-prep.    VISCO SUPPLEMENTATION INJECTION  A 22G 1.5 inch needle was inserted into the right knee(s) using a anterolateral approach. Depth and direction of the needle were monitored at all times. The syringe was aspirated and was negative for heme.  Then, 2 ml of synvisc/euflexxa was injected into the joint. Right: Exp date: 12/3/24, Lot  # [id number].       The injection site was cleaned and dressed with a spot band aid.  The patient tolerated the procedure well and with out complication The patient was instructed avoid heat and excessive activity for 24-48 hours.

## 2024-06-03 ENCOUNTER — OFFICE VISIT (OUTPATIENT)
Dept: ONCOLOGY | Age: 69
End: 2024-06-03
Payer: MEDICARE

## 2024-06-03 VITALS
DIASTOLIC BLOOD PRESSURE: 60 MMHG | BODY MASS INDEX: 37.39 KG/M2 | HEART RATE: 76 BPM | HEIGHT: 64 IN | OXYGEN SATURATION: 96 % | SYSTOLIC BLOOD PRESSURE: 126 MMHG | RESPIRATION RATE: 14 BRPM | WEIGHT: 219 LBS

## 2024-06-03 DIAGNOSIS — D47.2 MGUS (MONOCLONAL GAMMOPATHY OF UNKNOWN SIGNIFICANCE): ICD-10-CM

## 2024-06-03 DIAGNOSIS — D80.1 HYPOGAMMAGLOBULINEMIA (HCC): Primary | ICD-10-CM

## 2024-06-03 PROCEDURE — 1124F ACP DISCUSS-NO DSCNMKR DOCD: CPT | Performed by: INTERNAL MEDICINE

## 2024-06-03 PROCEDURE — 99213 OFFICE O/P EST LOW 20 MIN: CPT | Performed by: INTERNAL MEDICINE

## 2024-06-03 PROCEDURE — G8427 DOCREV CUR MEDS BY ELIG CLIN: HCPCS | Performed by: INTERNAL MEDICINE

## 2024-06-03 PROCEDURE — G8417 CALC BMI ABV UP PARAM F/U: HCPCS | Performed by: INTERNAL MEDICINE

## 2024-06-03 PROCEDURE — 1090F PRES/ABSN URINE INCON ASSESS: CPT | Performed by: INTERNAL MEDICINE

## 2024-06-03 PROCEDURE — 99214 OFFICE O/P EST MOD 30 MIN: CPT | Performed by: INTERNAL MEDICINE

## 2024-06-03 PROCEDURE — 1036F TOBACCO NON-USER: CPT | Performed by: INTERNAL MEDICINE

## 2024-06-03 PROCEDURE — G8400 PT W/DXA NO RESULTS DOC: HCPCS | Performed by: INTERNAL MEDICINE

## 2024-06-03 PROCEDURE — 3078F DIAST BP <80 MM HG: CPT | Performed by: INTERNAL MEDICINE

## 2024-06-03 PROCEDURE — 3017F COLORECTAL CA SCREEN DOC REV: CPT | Performed by: INTERNAL MEDICINE

## 2024-06-03 PROCEDURE — 3074F SYST BP LT 130 MM HG: CPT | Performed by: INTERNAL MEDICINE

## 2024-06-03 NOTE — PROGRESS NOTES
Neurological: negative for headaches, dizziness, seizures, weakness, numbness  OBJECTIVE:        Vitals:    06/03/24 1312   BP: 126/60   Pulse: 76   Resp: 14   SpO2: 96%     LABORATORY DATA:     Lab Results   Component Value Date    WBC 5.0 05/24/2024    HGB 12.7 05/24/2024    HCT 39.0 05/24/2024    MCV 95.6 05/24/2024     05/24/2024    LYMPHOPCT 32 05/24/2024    RBC 4.08 05/24/2024    MCH 31.1 05/24/2024    MCHC 32.6 05/24/2024    RDW 14.0 05/24/2024    NEUTOPHILPCT 56 05/24/2024    MONOPCT 9 05/24/2024    EOSPCT 2 05/24/2024    BASOPCT 1 05/24/2024    NEUTROABS 2.77 05/24/2024    LYMPHSABS 1.76 04/27/2023    MONOSABS 0.43 05/24/2024    EOSABS 0.11 05/24/2024    BASOSABS 0.03 05/24/2024         Chemistry        Component Value Date/Time     05/24/2024 0810    K 4.9 05/24/2024 0810     05/24/2024 0810    CO2 27 05/24/2024 0810    BUN 16 05/24/2024 0810    CREATININE 0.6 05/24/2024 0810        Component Value Date/Time    CALCIUM 9.2 05/24/2024 0810    ALKPHOS 112 (H) 05/24/2024 0810    AST 17 05/24/2024 0810    ALT 16 05/24/2024 0810    BILITOT 0.7 05/24/2024 0810      Iron Profile (03/18/2020 11:58 AM EDT)  Iron Profile (03/18/2020 11:58 AM EDT)   Component Value Ref Range Performed At Pathologist Signature   Iron 15 (L) 39 - 145 ug/dL [facility]     TIBC 366 260 - 445 ug/dL [facility]     UIBC 351 110 - 370 ug/dL [facility]     % Sat 4 (L) 20 - 55 % [facility]       Iron Profile (03/18/2020 11:58 AM EDT)   Specimen       PATHOLOGY DATA:   Reviewed   IMAGING DATA:    Reviewed  ASSESSMENT:    Mine is a 69 y.o.  female with chronic anemia and most likely secondary to iron deficiency.  She has history of iron intolerance therefore she has been receiving iron infusions in the past with her primary care physician.  She has had GI evaluation in 2018 as reported by patient and was negative.  She never had a video capsule endoscopy which would

## 2024-06-18 ENCOUNTER — PATIENT MESSAGE (OUTPATIENT)
Dept: ONCOLOGY | Age: 69
End: 2024-06-18

## 2024-06-18 DIAGNOSIS — D50.0 IRON DEFICIENCY ANEMIA DUE TO CHRONIC BLOOD LOSS: Primary | ICD-10-CM

## 2024-06-18 NOTE — TELEPHONE ENCOUNTER
From: Thu Kaur  To: Dr. Neil Wakefield  Sent: 6/18/2024 11:22 AM EDT  Subject: My iron level    I think my iron level has dropped I'm dizzy in that and was wondering if I could get a blood test done. If I could could you please let me know when I could come over to get it thank you

## 2024-06-19 ENCOUNTER — HOSPITAL ENCOUNTER (OUTPATIENT)
Age: 69
Discharge: HOME | End: 2024-06-19
Payer: MEDICARE

## 2024-06-19 DIAGNOSIS — D50.0 IRON DEFICIENCY ANEMIA DUE TO CHRONIC BLOOD LOSS: ICD-10-CM

## 2024-06-19 LAB
BASOPHILS # BLD: 0.05 K/UL (ref 0–0.2)
BASOPHILS NFR BLD: 1 % (ref 0–2)
EOSINOPHIL # BLD: 0.11 K/UL (ref 0–0.44)
EOSINOPHILS RELATIVE PERCENT: 2 % (ref 1–4)
ERYTHROCYTE [DISTWIDTH] IN BLOOD BY AUTOMATED COUNT: 13.9 % (ref 11.8–14.4)
FERRITIN SERPL-MCNC: 28 NG/ML (ref 13–150)
HCT VFR BLD AUTO: 40.4 % (ref 36.3–47.1)
HGB BLD-MCNC: 13 G/DL (ref 11.9–15.1)
IMM GRANULOCYTES # BLD AUTO: <0.03 K/UL (ref 0–0.3)
IMM GRANULOCYTES NFR BLD: 0 %
IRON SATN MFR SERPL: 24 % (ref 20–55)
IRON SERPL-MCNC: 89 UG/DL (ref 37–145)
LYMPHOCYTES NFR BLD: 2.07 K/UL (ref 1.1–3.7)
LYMPHOCYTES RELATIVE PERCENT: 33 % (ref 24–43)
MCH RBC QN AUTO: 30.7 PG (ref 25.2–33.5)
MCHC RBC AUTO-ENTMCNC: 32.2 G/DL (ref 25.2–33.5)
MCV RBC AUTO: 95.5 FL (ref 82.6–102.9)
MONOCYTES NFR BLD: 0.51 K/UL (ref 0.1–1.2)
MONOCYTES NFR BLD: 8 % (ref 3–12)
NEUTROPHILS NFR BLD: 56 % (ref 36–65)
NEUTS SEG NFR BLD: 3.52 K/UL (ref 1.5–8.1)
NRBC BLD-RTO: 0 PER 100 WBC
PLATELET # BLD AUTO: 328 K/UL (ref 138–453)
PMV BLD AUTO: 9.5 FL (ref 8.1–13.5)
RBC # BLD AUTO: 4.23 M/UL (ref 3.95–5.11)
TIBC SERPL-MCNC: 378 UG/DL (ref 250–450)
UNSATURATED IRON BINDING CAPACITY: 289 UG/DL (ref 112–347)
WBC OTHER # BLD: 6.3 K/UL (ref 3.5–11.3)

## 2024-06-19 PROCEDURE — 82728 ASSAY OF FERRITIN: CPT

## 2024-06-19 PROCEDURE — 36415 COLL VENOUS BLD VENIPUNCTURE: CPT

## 2024-06-19 PROCEDURE — 83550 IRON BINDING TEST: CPT

## 2024-06-19 PROCEDURE — 83540 ASSAY OF IRON: CPT

## 2024-06-19 PROCEDURE — 85025 COMPLETE CBC W/AUTO DIFF WBC: CPT

## 2024-07-08 ENCOUNTER — HOSPITAL ENCOUNTER (OUTPATIENT)
Age: 69
Discharge: HOME | End: 2024-07-08
Payer: MEDICARE

## 2024-07-08 DIAGNOSIS — D80.1 HYPOGAMMAGLOBULINEMIA (HCC): Primary | ICD-10-CM

## 2024-07-08 DIAGNOSIS — D47.2 MGUS (MONOCLONAL GAMMOPATHY OF UNKNOWN SIGNIFICANCE): ICD-10-CM

## 2024-07-08 DIAGNOSIS — D80.1 HYPOGAMMAGLOBULINEMIA (HCC): ICD-10-CM

## 2024-07-08 LAB
BASOPHILS # BLD: 0.04 K/UL (ref 0–0.2)
BASOPHILS NFR BLD: 1 % (ref 0–2)
EOSINOPHIL # BLD: 0.18 K/UL (ref 0–0.44)
EOSINOPHILS RELATIVE PERCENT: 3 % (ref 1–4)
ERYTHROCYTE [DISTWIDTH] IN BLOOD BY AUTOMATED COUNT: 14.1 % (ref 11.8–14.4)
FREE KAPPA/LAMBDA RATIO: 1.19 (ref 0.22–1.74)
HCT VFR BLD AUTO: 36.7 % (ref 36.3–47.1)
HGB BLD-MCNC: 11.9 G/DL (ref 11.9–15.1)
IGA SERPL-MCNC: 84 MG/DL (ref 70–400)
IGG SERPL-MCNC: 595 MG/DL (ref 700–1600)
IGM SERPL-MCNC: 38 MG/DL (ref 40–230)
IMM GRANULOCYTES # BLD AUTO: <0.03 K/UL (ref 0–0.3)
IMM GRANULOCYTES NFR BLD: 0 %
KAPPA LC FREE SER-MCNC: 23.7 MG/L
LAMBDA LC FREE SERPL-MCNC: 20 MG/L (ref 4.2–27.7)
LYMPHOCYTES NFR BLD: 1.39 K/UL (ref 1.1–3.7)
LYMPHOCYTES RELATIVE PERCENT: 23 % (ref 24–43)
MCH RBC QN AUTO: 30.9 PG (ref 25.2–33.5)
MCHC RBC AUTO-ENTMCNC: 32.4 G/DL (ref 25.2–33.5)
MCV RBC AUTO: 95.3 FL (ref 82.6–102.9)
MONOCYTES NFR BLD: 0.49 K/UL (ref 0.1–1.2)
MONOCYTES NFR BLD: 8 % (ref 3–12)
NEUTROPHILS NFR BLD: 65 % (ref 36–65)
NEUTS SEG NFR BLD: 4 K/UL (ref 1.5–8.1)
NRBC BLD-RTO: 0 PER 100 WBC
PLATELET # BLD AUTO: 337 K/UL (ref 138–453)
PMV BLD AUTO: 9.3 FL (ref 8.1–13.5)
RBC # BLD AUTO: 3.85 M/UL (ref 3.95–5.11)
WBC OTHER # BLD: 6.1 K/UL (ref 3.5–11.3)

## 2024-07-08 PROCEDURE — 84155 ASSAY OF PROTEIN SERUM: CPT

## 2024-07-08 PROCEDURE — 84165 PROTEIN E-PHORESIS SERUM: CPT

## 2024-07-08 PROCEDURE — 85025 COMPLETE CBC W/AUTO DIFF WBC: CPT

## 2024-07-08 PROCEDURE — 82784 ASSAY IGA/IGD/IGG/IGM EACH: CPT

## 2024-07-08 PROCEDURE — 83521 IG LIGHT CHAINS FREE EACH: CPT

## 2024-07-08 PROCEDURE — 36415 COLL VENOUS BLD VENIPUNCTURE: CPT

## 2024-07-09 LAB
ALBUMIN PERCENT: 60 % (ref 45–65)
ALBUMIN SERPL-MCNC: 3.6 G/DL (ref 3.2–5.2)
ALPHA 2 PERCENT: 14 % (ref 6–13)
ALPHA1 GLOB SERPL ELPH-MCNC: 0.3 G/DL (ref 0.1–0.4)
ALPHA1 GLOB SERPL ELPH-MCNC: 5 % (ref 3–6)
ALPHA2 GLOB SERPL ELPH-MCNC: 0.8 G/DL (ref 0.5–0.9)
B-GLOBULIN SERPL ELPH-MCNC: 0.7 G/DL (ref 0.5–1.1)
B-GLOBULIN SERPL ELPH-MCNC: 12 % (ref 11–19)
GAMMA GLOB SERPL ELPH-MCNC: 0.5 G/DL (ref 0.5–1.5)
GAMMA GLOBULIN %: 9 % (ref 9–20)
PATHOLOGIST: ABNORMAL
PROT PATTERN SERPL ELPH-IMP: ABNORMAL
PROT SERPL-MCNC: 6 G/DL (ref 6.6–8.7)
TOTAL PROT. SUM,%: 100 % (ref 98–102)
TOTAL PROT. SUM: 5.9 G/DL (ref 6.3–8.2)

## 2024-07-12 RX ORDER — BENZONATATE 100 MG/1
CAPSULE ORAL
Qty: 90 CAPSULE | Refills: 0 | Status: SHIPPED | OUTPATIENT
Start: 2024-07-12

## 2024-07-12 RX ORDER — ALBUTEROL SULFATE 90 UG/1
AEROSOL, METERED RESPIRATORY (INHALATION)
Qty: 25.5 G | Refills: 0 | Status: SHIPPED | OUTPATIENT
Start: 2024-07-12

## 2024-07-29 ENCOUNTER — OFFICE VISIT (OUTPATIENT)
Dept: ONCOLOGY | Age: 69
End: 2024-07-29
Payer: MEDICARE

## 2024-07-29 VITALS
HEART RATE: 75 BPM | HEIGHT: 64 IN | DIASTOLIC BLOOD PRESSURE: 70 MMHG | WEIGHT: 214 LBS | SYSTOLIC BLOOD PRESSURE: 138 MMHG | BODY MASS INDEX: 36.54 KG/M2 | OXYGEN SATURATION: 97 %

## 2024-07-29 DIAGNOSIS — D80.1 HYPOGAMMAGLOBULINEMIA (HCC): Primary | ICD-10-CM

## 2024-07-29 DIAGNOSIS — D47.2 MGUS (MONOCLONAL GAMMOPATHY OF UNKNOWN SIGNIFICANCE): ICD-10-CM

## 2024-07-29 PROCEDURE — G8427 DOCREV CUR MEDS BY ELIG CLIN: HCPCS | Performed by: INTERNAL MEDICINE

## 2024-07-29 PROCEDURE — 1036F TOBACCO NON-USER: CPT | Performed by: INTERNAL MEDICINE

## 2024-07-29 PROCEDURE — 3075F SYST BP GE 130 - 139MM HG: CPT | Performed by: INTERNAL MEDICINE

## 2024-07-29 PROCEDURE — 1090F PRES/ABSN URINE INCON ASSESS: CPT | Performed by: INTERNAL MEDICINE

## 2024-07-29 PROCEDURE — 99214 OFFICE O/P EST MOD 30 MIN: CPT | Performed by: INTERNAL MEDICINE

## 2024-07-29 PROCEDURE — 3017F COLORECTAL CA SCREEN DOC REV: CPT | Performed by: INTERNAL MEDICINE

## 2024-07-29 PROCEDURE — 99213 OFFICE O/P EST LOW 20 MIN: CPT | Performed by: INTERNAL MEDICINE

## 2024-07-29 PROCEDURE — 3078F DIAST BP <80 MM HG: CPT | Performed by: INTERNAL MEDICINE

## 2024-07-29 PROCEDURE — 1124F ACP DISCUSS-NO DSCNMKR DOCD: CPT | Performed by: INTERNAL MEDICINE

## 2024-07-29 PROCEDURE — G8417 CALC BMI ABV UP PARAM F/U: HCPCS | Performed by: INTERNAL MEDICINE

## 2024-07-29 PROCEDURE — G8400 PT W/DXA NO RESULTS DOC: HCPCS | Performed by: INTERNAL MEDICINE

## 2024-07-29 NOTE — PATIENT INSTRUCTIONS
Labs Immunoglobulin level monthly   Next is 8/8   And if lower than 600 Give iVIG infusion   RTc 2 months w labs

## 2024-07-29 NOTE — PROGRESS NOTES
tobacco: Never    Tobacco comments:     quit cold turkey   Vaping Use    Vaping Use: Never used   Substance and Sexual Activity    Alcohol use: Yes    Drug use: Never    Sexual activity: Not on file   Other Topics Concern    Not on file   Social History Narrative    Not on file     Social Determinants of Health     Financial Resource Strain: Not on file   Food Insecurity: Not on file   Transportation Needs: Not on file   Physical Activity: Not on file   Stress: Not on file   Social Connections: Not on file   Intimate Partner Violence: Not on file   Housing Stability: Not on file     Family History   Problem Relation Age of Onset    Heart Disease Mother     High Blood Pressure Mother     COPD Father     Heart Disease Father     High Blood Pressure Father     Cancer Father         lung      REVIEW OF SYSTEM:   Constitutional: No fever or chills. No night sweats, no weight loss   Eyes: No eye discharge, double vision, or eye pain   HEENT: negative for sore mouth, sore throat, hoarseness and voice change   Respiratory: negative for cough , sputum, dyspnea, wheezing, hemoptysis, chest pain   Cardiovascular: negative for chest pain, dyspnea, palpitations, orthopnea, PND   Gastrointestinal: negative for nausea, vomiting, diarrhea, constipation, abdominal pain, Dysphagia, hematemesis and hematochezia   Genitourinary: negative for frequency, dysuria, nocturia, urinary incontinence, and hematuria   Integument: negative for rash, skin lesions, bruises.   Hematologic/Lymphatic: negative for easy bruising, bleeding, lymphadenopathy, petechiae and swelling/edema   Endocrine: negative for heat or cold intolerance, tremor, weight changes, change in bowel habits and hair loss   Musculoskeletal: negative for myalgias, arthralgias, pain, joint swelling,and bone pain   Neurological: negative for headaches, dizziness, seizures, weakness, numbness  OBJECTIVE:        Vitals:    07/29/24 1548   BP: 138/70   Pulse: 75   SpO2: 97%

## 2024-08-07 ENCOUNTER — HOSPITAL ENCOUNTER (OUTPATIENT)
Age: 69
Discharge: HOME | End: 2024-08-07
Payer: MEDICARE

## 2024-08-07 DIAGNOSIS — D47.2 MGUS (MONOCLONAL GAMMOPATHY OF UNKNOWN SIGNIFICANCE): ICD-10-CM

## 2024-08-07 DIAGNOSIS — D80.1 HYPOGAMMAGLOBULINEMIA (HCC): ICD-10-CM

## 2024-08-07 LAB
IGA SERPL-MCNC: 85 MG/DL (ref 70–400)
IGG SERPL-MCNC: 675 MG/DL (ref 700–1600)
IGM SERPL-MCNC: 33 MG/DL (ref 40–230)

## 2024-08-07 PROCEDURE — 36415 COLL VENOUS BLD VENIPUNCTURE: CPT

## 2024-08-07 PROCEDURE — 82784 ASSAY IGA/IGD/IGG/IGM EACH: CPT

## 2024-08-16 ENCOUNTER — OFFICE VISIT (OUTPATIENT)
Dept: PAIN MANAGEMENT | Age: 69
End: 2024-08-16
Payer: MEDICARE

## 2024-08-16 VITALS
HEART RATE: 62 BPM | WEIGHT: 221 LBS | HEIGHT: 64 IN | DIASTOLIC BLOOD PRESSURE: 80 MMHG | BODY MASS INDEX: 37.73 KG/M2 | OXYGEN SATURATION: 98 % | SYSTOLIC BLOOD PRESSURE: 136 MMHG

## 2024-08-16 DIAGNOSIS — M70.62 TROCHANTERIC BURSITIS OF LEFT HIP: ICD-10-CM

## 2024-08-16 DIAGNOSIS — M51.36 DEGENERATION OF LUMBAR INTERVERTEBRAL DISC: Primary | ICD-10-CM

## 2024-08-16 DIAGNOSIS — M16.10 PRIMARY LOCALIZED OSTEOARTHRITIS OF PELVIC REGION AND THIGH: ICD-10-CM

## 2024-08-16 DIAGNOSIS — M47.816 LUMBAR FACET JOINT SYNDROME: ICD-10-CM

## 2024-08-16 DIAGNOSIS — M54.16 LUMBAR RADICULOPATHY: ICD-10-CM

## 2024-08-16 PROCEDURE — 1090F PRES/ABSN URINE INCON ASSESS: CPT | Performed by: NURSE PRACTITIONER

## 2024-08-16 PROCEDURE — G8400 PT W/DXA NO RESULTS DOC: HCPCS | Performed by: NURSE PRACTITIONER

## 2024-08-16 PROCEDURE — 1036F TOBACCO NON-USER: CPT | Performed by: NURSE PRACTITIONER

## 2024-08-16 PROCEDURE — 3075F SYST BP GE 130 - 139MM HG: CPT | Performed by: NURSE PRACTITIONER

## 2024-08-16 PROCEDURE — 99214 OFFICE O/P EST MOD 30 MIN: CPT | Performed by: NURSE PRACTITIONER

## 2024-08-16 PROCEDURE — G8427 DOCREV CUR MEDS BY ELIG CLIN: HCPCS | Performed by: NURSE PRACTITIONER

## 2024-08-16 PROCEDURE — 1124F ACP DISCUSS-NO DSCNMKR DOCD: CPT | Performed by: NURSE PRACTITIONER

## 2024-08-16 PROCEDURE — 3079F DIAST BP 80-89 MM HG: CPT | Performed by: NURSE PRACTITIONER

## 2024-08-16 PROCEDURE — G8417 CALC BMI ABV UP PARAM F/U: HCPCS | Performed by: NURSE PRACTITIONER

## 2024-08-16 PROCEDURE — 3017F COLORECTAL CA SCREEN DOC REV: CPT | Performed by: NURSE PRACTITIONER

## 2024-08-16 RX ORDER — GABAPENTIN 100 MG/1
CAPSULE ORAL
Qty: 90 CAPSULE | Refills: 1 | Status: SHIPPED | OUTPATIENT
Start: 2024-08-16 | End: 2024-09-27

## 2024-08-16 ASSESSMENT — ENCOUNTER SYMPTOMS
BACK PAIN: 1
SORE THROAT: 0
DIARRHEA: 1
SHORTNESS OF BREATH: 0

## 2024-08-16 NOTE — PROGRESS NOTES
Subjective:      Patient ID: Thu Kaur is a 69 y.o. female.  Chief Complaint   Patient presents with    Pain     Discuss hip injections      Back Pain  Pertinent negatives include no chest pain, fever, numbness or weakness.   Hip Pain   Pertinent negatives include no numbness.   Knee Pain   Pertinent negatives include no numbness.   Pain  Associated symptoms include arthralgias and myalgias. Pertinent negatives include no chest pain, fever, numbness, sore throat or weakness.      RLE pain. Sensitive to oral opiate. Tramadol makes her feel funny, constipation side effect. T#3, GI side effect as well    Pain Assessment  Location of Pain: Knee  Location Modifiers: Right  Severity of Pain: 4  Quality of Pain: Aching  Duration of Pain: Persistent  Frequency of Pain: Constant  Aggravating Factors: Walking, Standing, Squatting, Kneeling, Stretching  Limiting Behavior: Yes  Relieving Factors: Rest, Other (Comment) (skinners)  Result of Injury: No  Work-Related Injury: No  Are there other pain locations you wish to document?: No    Allergies   Allergen Reactions    Morphine Hives    Amoxicillin Other (See Comments)    Clavulanic Acid Other (See Comments)    Amitriptyline Nausea Only    Amoxicillin-Pot Clavulanate Nausea Only and Other (See Comments)    Aspirin Other (See Comments)     Sensitive \"gets sick\"    Capsaicin-Menthol-Methyl Sal Dermatitis    Egg-Derived Products Other (See Comments)    Nitrofurantoin Monohyd Macro Nausea Only    Nsaids Nausea Only       Outpatient Medications Marked as Taking for the 8/16/24 encounter (Office Visit) with Camille Waters APRN - CNP   Medication Sig Dispense Refill    gabapentin (NEURONTIN) 100 MG capsule Gabapentin (Neurontin) Dosing Instructions: Take 1 (one) at bedtime for 5 days, then twice daily for 5 days, then three times per day. 90 capsule 1    benzonatate (TESSALON) 100 MG capsule Take 1 capsule by mouth 3 times daily as needed for Cough with increased fluids 90

## 2024-08-16 NOTE — PATIENT INSTRUCTIONS
Gabapentin (Neurontin) Dosing Instructions: Take 1 (one) at bedtime for 5 days, then twice daily for 5 days, then three times per day.

## 2024-08-20 RX ORDER — FLUTICASONE PROPIONATE AND SALMETEROL 500; 50 UG/1; UG/1
POWDER RESPIRATORY (INHALATION)
Qty: 3 EACH | Refills: 3 | Status: SHIPPED | OUTPATIENT
Start: 2024-08-20

## 2024-09-09 ENCOUNTER — HOSPITAL ENCOUNTER (OUTPATIENT)
Age: 69
Discharge: HOME | End: 2024-09-09
Payer: MEDICARE

## 2024-09-09 DIAGNOSIS — D80.1 HYPOGAMMAGLOBULINEMIA (HCC): ICD-10-CM

## 2024-09-09 LAB
ALBUMIN SERPL-MCNC: 3.9 G/DL (ref 3.5–5.2)
ALBUMIN/GLOB SERPL: 1.4 {RATIO} (ref 1–2.5)
ALP SERPL-CCNC: 112 U/L (ref 35–104)
ALT SERPL-CCNC: 16 U/L (ref 5–33)
ANION GAP SERPL CALCULATED.3IONS-SCNC: 10 MMOL/L (ref 9–17)
AST SERPL-CCNC: 18 U/L
BASOPHILS # BLD: 0.03 K/UL (ref 0–0.2)
BASOPHILS NFR BLD: 1 % (ref 0–2)
BILIRUB SERPL-MCNC: 0.8 MG/DL (ref 0.3–1.2)
BUN SERPL-MCNC: 9 MG/DL (ref 8–23)
BUN/CREAT SERPL: 18 (ref 9–20)
CALCIUM SERPL-MCNC: 9.2 MG/DL (ref 8.6–10.4)
CHLORIDE SERPL-SCNC: 107 MMOL/L (ref 98–107)
CO2 SERPL-SCNC: 24 MMOL/L (ref 20–31)
CREAT SERPL-MCNC: 0.5 MG/DL (ref 0.5–0.9)
EOSINOPHIL # BLD: 0.11 K/UL (ref 0–0.44)
EOSINOPHILS RELATIVE PERCENT: 2 % (ref 1–4)
ERYTHROCYTE [DISTWIDTH] IN BLOOD BY AUTOMATED COUNT: 14.7 % (ref 11.8–14.4)
FREE KAPPA/LAMBDA RATIO: 1.1 (ref 0.22–1.74)
GFR, ESTIMATED: >90 ML/MIN/1.73M2
GLUCOSE SERPL-MCNC: 115 MG/DL (ref 70–99)
HCT VFR BLD AUTO: 37.2 % (ref 36.3–47.1)
HGB BLD-MCNC: 12.3 G/DL (ref 11.9–15.1)
IGA SERPL-MCNC: 74 MG/DL (ref 70–400)
IGG SERPL-MCNC: 615 MG/DL (ref 700–1600)
IGM SERPL-MCNC: 35 MG/DL (ref 40–230)
IMM GRANULOCYTES # BLD AUTO: <0.03 K/UL (ref 0–0.3)
IMM GRANULOCYTES NFR BLD: 0 %
KAPPA LC FREE SER-MCNC: 25 MG/L
LAMBDA LC FREE SERPL-MCNC: 22.8 MG/L (ref 4.2–27.7)
LYMPHOCYTES NFR BLD: 1.72 K/UL (ref 1.1–3.7)
LYMPHOCYTES RELATIVE PERCENT: 31 % (ref 24–43)
MCH RBC QN AUTO: 31.1 PG (ref 25.2–33.5)
MCHC RBC AUTO-ENTMCNC: 33.1 G/DL (ref 25.2–33.5)
MCV RBC AUTO: 93.9 FL (ref 82.6–102.9)
MONOCYTES NFR BLD: 0.42 K/UL (ref 0.1–1.2)
MONOCYTES NFR BLD: 8 % (ref 3–12)
NEUTROPHILS NFR BLD: 58 % (ref 36–65)
NEUTS SEG NFR BLD: 3.28 K/UL (ref 1.5–8.1)
NRBC BLD-RTO: 0 PER 100 WBC
PLATELET # BLD AUTO: 322 K/UL (ref 138–453)
PMV BLD AUTO: 9.2 FL (ref 8.1–13.5)
POTASSIUM SERPL-SCNC: 4.2 MMOL/L (ref 3.7–5.3)
PROT SERPL-MCNC: 6.6 G/DL (ref 6.4–8.3)
RBC # BLD AUTO: 3.96 M/UL (ref 3.95–5.11)
RBC # BLD: ABNORMAL 10*6/UL
SODIUM SERPL-SCNC: 141 MMOL/L (ref 135–144)
WBC OTHER # BLD: 5.6 K/UL (ref 3.5–11.3)

## 2024-09-09 PROCEDURE — 82784 ASSAY IGA/IGD/IGG/IGM EACH: CPT

## 2024-09-09 PROCEDURE — 83521 IG LIGHT CHAINS FREE EACH: CPT

## 2024-09-09 PROCEDURE — 84165 PROTEIN E-PHORESIS SERUM: CPT

## 2024-09-09 PROCEDURE — 84155 ASSAY OF PROTEIN SERUM: CPT

## 2024-09-09 PROCEDURE — 80053 COMPREHEN METABOLIC PANEL: CPT

## 2024-09-09 PROCEDURE — 36415 COLL VENOUS BLD VENIPUNCTURE: CPT

## 2024-09-09 PROCEDURE — 85025 COMPLETE CBC W/AUTO DIFF WBC: CPT

## 2024-09-09 PROCEDURE — 86334 IMMUNOFIX E-PHORESIS SERUM: CPT

## 2024-09-10 LAB
ALBUMIN PERCENT: 60 % (ref 45–65)
ALBUMIN SERPL-MCNC: 3.8 G/DL (ref 3.2–5.2)
ALPHA 2 PERCENT: 14 % (ref 6–13)
ALPHA1 GLOB SERPL ELPH-MCNC: 0.3 G/DL (ref 0.1–0.4)
ALPHA1 GLOB SERPL ELPH-MCNC: 5 % (ref 3–6)
ALPHA2 GLOB SERPL ELPH-MCNC: 0.9 G/DL (ref 0.5–0.9)
B-GLOBULIN SERPL ELPH-MCNC: 0.8 G/DL (ref 0.5–1.1)
B-GLOBULIN SERPL ELPH-MCNC: 12 % (ref 11–19)
GAMMA GLOB SERPL ELPH-MCNC: 0.5 G/DL (ref 0.5–1.5)
GAMMA GLOBULIN %: 9 % (ref 9–20)
ITYP INTERPRETATION: NORMAL
PATH REV: NORMAL
PATHOLOGIST: ABNORMAL
PROT PATTERN SERPL ELPH-IMP: ABNORMAL
PROT SERPL-MCNC: 6.3 G/DL (ref 6.6–8.7)
TOTAL PROT. SUM,%: 100 % (ref 98–102)
TOTAL PROT. SUM: 6.3 G/DL (ref 6.3–8.2)

## 2024-09-16 ENCOUNTER — OFFICE VISIT (OUTPATIENT)
Dept: ONCOLOGY | Age: 69
End: 2024-09-16
Payer: MEDICARE

## 2024-09-16 VITALS
SYSTOLIC BLOOD PRESSURE: 130 MMHG | HEIGHT: 64 IN | WEIGHT: 214.4 LBS | DIASTOLIC BLOOD PRESSURE: 64 MMHG | HEART RATE: 94 BPM | RESPIRATION RATE: 16 BRPM | OXYGEN SATURATION: 96 % | BODY MASS INDEX: 36.6 KG/M2 | TEMPERATURE: 97.7 F

## 2024-09-16 DIAGNOSIS — D80.1 HYPOGAMMAGLOBULINEMIA (HCC): Primary | ICD-10-CM

## 2024-09-16 PROCEDURE — G8427 DOCREV CUR MEDS BY ELIG CLIN: HCPCS | Performed by: INTERNAL MEDICINE

## 2024-09-16 PROCEDURE — 3078F DIAST BP <80 MM HG: CPT | Performed by: INTERNAL MEDICINE

## 2024-09-16 PROCEDURE — 3075F SYST BP GE 130 - 139MM HG: CPT | Performed by: INTERNAL MEDICINE

## 2024-09-16 PROCEDURE — 1036F TOBACCO NON-USER: CPT | Performed by: INTERNAL MEDICINE

## 2024-09-16 PROCEDURE — 1090F PRES/ABSN URINE INCON ASSESS: CPT | Performed by: INTERNAL MEDICINE

## 2024-09-16 PROCEDURE — 3017F COLORECTAL CA SCREEN DOC REV: CPT | Performed by: INTERNAL MEDICINE

## 2024-09-16 PROCEDURE — G8400 PT W/DXA NO RESULTS DOC: HCPCS | Performed by: INTERNAL MEDICINE

## 2024-09-16 PROCEDURE — 1124F ACP DISCUSS-NO DSCNMKR DOCD: CPT | Performed by: INTERNAL MEDICINE

## 2024-09-16 PROCEDURE — 99213 OFFICE O/P EST LOW 20 MIN: CPT | Performed by: INTERNAL MEDICINE

## 2024-09-16 PROCEDURE — G8417 CALC BMI ABV UP PARAM F/U: HCPCS | Performed by: INTERNAL MEDICINE

## 2024-09-16 PROCEDURE — 99214 OFFICE O/P EST MOD 30 MIN: CPT | Performed by: INTERNAL MEDICINE

## 2024-09-16 RX ORDER — CLOTRIMAZOLE 10 MG/1
LOZENGE ORAL
COMMUNITY
Start: 2024-09-11

## 2024-10-25 ENCOUNTER — OFFICE VISIT (OUTPATIENT)
Dept: PULMONOLOGY | Age: 69
End: 2024-10-25

## 2024-10-25 VITALS
HEIGHT: 64 IN | SYSTOLIC BLOOD PRESSURE: 139 MMHG | TEMPERATURE: 97.2 F | WEIGHT: 215 LBS | DIASTOLIC BLOOD PRESSURE: 76 MMHG | RESPIRATION RATE: 14 BRPM | HEART RATE: 82 BPM | OXYGEN SATURATION: 95 % | BODY MASS INDEX: 36.7 KG/M2

## 2024-10-25 DIAGNOSIS — Z99.81 CHRONIC RESPIRATORY FAILURE WITH HYPOXIA, ON HOME OXYGEN THERAPY: ICD-10-CM

## 2024-10-25 DIAGNOSIS — E66.812 CLASS 2 SEVERE OBESITY DUE TO EXCESS CALORIES WITH SERIOUS COMORBIDITY AND BODY MASS INDEX (BMI) OF 36.0 TO 36.9 IN ADULT: ICD-10-CM

## 2024-10-25 DIAGNOSIS — J44.9 STAGE 2 MODERATE COPD BY GOLD CLASSIFICATION (HCC): ICD-10-CM

## 2024-10-25 DIAGNOSIS — J96.11 CHRONIC RESPIRATORY FAILURE WITH HYPOXIA, ON HOME OXYGEN THERAPY: ICD-10-CM

## 2024-10-25 DIAGNOSIS — E66.01 CLASS 2 SEVERE OBESITY DUE TO EXCESS CALORIES WITH SERIOUS COMORBIDITY AND BODY MASS INDEX (BMI) OF 36.0 TO 36.9 IN ADULT: ICD-10-CM

## 2024-10-25 DIAGNOSIS — G47.33 OSA (OBSTRUCTIVE SLEEP APNEA): Primary | ICD-10-CM

## 2024-10-25 RX ORDER — ALBUTEROL SULFATE 90 UG/1
2 INHALANT RESPIRATORY (INHALATION) EVERY 4 HOURS PRN
Qty: 25.5 G | Refills: 0 | Status: SHIPPED | OUTPATIENT
Start: 2024-10-25

## 2024-10-25 RX ORDER — LIDOCAINE 50 MG/G
1 PATCH TOPICAL DAILY
COMMUNITY

## 2024-10-25 RX ORDER — MONTELUKAST SODIUM 10 MG/1
TABLET ORAL
Qty: 90 TABLET | Refills: 3 | Status: SHIPPED | OUTPATIENT
Start: 2024-10-25

## 2024-10-25 RX ORDER — FAMOTIDINE 20 MG/1
20 TABLET, FILM COATED ORAL 2 TIMES DAILY
COMMUNITY

## 2024-10-25 RX ORDER — FEXOFENADINE HCL 180 MG/1
TABLET ORAL
COMMUNITY

## 2024-10-25 RX ORDER — GUAIFENESIN 600 MG/1
1200 TABLET, EXTENDED RELEASE ORAL 2 TIMES DAILY
COMMUNITY

## 2024-10-25 RX ORDER — FLUTICASONE PROPIONATE AND SALMETEROL 500; 50 UG/1; UG/1
1 POWDER RESPIRATORY (INHALATION) 2 TIMES DAILY
Qty: 3 EACH | Refills: 6 | Status: SHIPPED | OUTPATIENT
Start: 2024-10-25

## 2024-10-25 RX ORDER — IPRATROPIUM BROMIDE AND ALBUTEROL SULFATE 2.5; .5 MG/3ML; MG/3ML
SOLUTION RESPIRATORY (INHALATION)
Qty: 360 ML | Refills: 11 | Status: SHIPPED | OUTPATIENT
Start: 2024-10-25

## 2024-10-25 ASSESSMENT — SLEEP AND FATIGUE QUESTIONNAIRES
HOW LIKELY ARE YOU TO NOD OFF OR FALL ASLEEP WHILE WATCHING TV: MODERATE CHANCE OF DOZING
HOW LIKELY ARE YOU TO NOD OFF OR FALL ASLEEP IN A CAR, WHILE STOPPED FOR A FEW MINUTES IN TRAFFIC: WOULD NEVER DOZE
HOW LIKELY ARE YOU TO NOD OFF OR FALL ASLEEP WHEN YOU ARE A PASSENGER IN A CAR FOR AN HOUR WITHOUT A BREAK: SLIGHT CHANCE OF DOZING
HOW LIKELY ARE YOU TO NOD OFF OR FALL ASLEEP WHILE SITTING AND TALKING TO SOMEONE: WOULD NEVER DOZE
HOW LIKELY ARE YOU TO NOD OFF OR FALL ASLEEP WHILE LYING DOWN TO REST IN THE AFTERNOON WHEN CIRCUMSTANCES PERMIT: SLIGHT CHANCE OF DOZING
HOW LIKELY ARE YOU TO NOD OFF OR FALL ASLEEP WHILE SITTING QUIETLY AFTER LUNCH WITHOUT ALCOHOL: WOULD NEVER DOZE
HOW LIKELY ARE YOU TO NOD OFF OR FALL ASLEEP WHILE SITTING AND READING: HIGH CHANCE OF DOZING
HOW LIKELY ARE YOU TO NOD OFF OR FALL ASLEEP WHILE SITTING INACTIVE IN A PUBLIC PLACE: SLIGHT CHANCE OF DOZING
ESS TOTAL SCORE: 8

## 2024-10-25 NOTE — PROGRESS NOTES
[facility]  Office visit follow-up  ______________________________________________________________________________    Patient: Thu Kaur  YOB: 1955   MRN: [id number]    Acct:      Today's date: 10/25/24    Chief complaint   Follow up     History of present illness     A 69 y.o. female returns today to the pulmonary/sleep clinic for obstructive sleep apnea, COPD, and chronic hypoxemic respiratory failure follow-up.  The patient was last time seen by Dr. Cuevas and March 2024.  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  Currently following with heme-onc in clinic for IgG level and management.  Inhalers:  The patient currently on Advair 500/50, albuterol versus DuoNeb as needed, montelukast 10 mg daily.    Interval history: 10/25/24  For today the patient feels overall good and she is currently at her baseline from the respiratory standpoint.  She has intermittent dyspnea on exertion although she denies significant cough and wheezing.  She denies chest pain and tightness and recent exacerbation.  She did not have any respiratory infection in the last few months.  She is following with heme-onc clinic for the hypogammaglobinemia and currently not receiving IVIG as the IgG level is low but stable.  No recent exacerbation of COPD.  No PFT was available in the chart to review.  The patient is intermittently using oxygen especially with a sleep and with significant exertion.  She is very compliant with the CPAP machine use.  The compliance report from 7/26/2024 to 10/23/2024 showed the patient is very compliant and she is 100% using it in the last 3 months, average usage about 7 hours, she is currently on CPAP 13 cm H2O, the leak about 34 L/min and the AHI about 5.  Denies any problem with the mask or tubing or the pressure.  She feels refreshed in the morning

## 2024-10-25 NOTE — PATIENT INSTRUCTIONS
Once OV note is signed Clarisse will fax DME order to [facility].  Clarisse will schedule PFT and will send a [employer] message with the details.

## 2024-11-06 ENCOUNTER — INITIAL CONSULT (OUTPATIENT)
Dept: SURGERY | Age: 69
End: 2024-11-06
Payer: MEDICARE

## 2024-11-06 ENCOUNTER — TELEPHONE (OUTPATIENT)
Dept: SURGERY | Age: 69
End: 2024-11-06

## 2024-11-06 ENCOUNTER — HOSPITAL ENCOUNTER (OUTPATIENT)
Dept: PULMONOLOGY | Age: 69
Discharge: HOME | End: 2024-11-06
Payer: MEDICARE

## 2024-11-06 VITALS
WEIGHT: 218 LBS | BODY MASS INDEX: 37.22 KG/M2 | SYSTOLIC BLOOD PRESSURE: 126 MMHG | DIASTOLIC BLOOD PRESSURE: 70 MMHG | HEART RATE: 70 BPM | HEIGHT: 64 IN | TEMPERATURE: 97.9 F | RESPIRATION RATE: 18 BRPM

## 2024-11-06 DIAGNOSIS — K44.9 PARAESOPHAGEAL HERNIA: Primary | ICD-10-CM

## 2024-11-06 DIAGNOSIS — J44.9 STAGE 2 MODERATE COPD BY GOLD CLASSIFICATION (HCC): ICD-10-CM

## 2024-11-06 LAB
DLCO %PRED: NORMAL
DLCO PRED: NORMAL
DLCO/VA %PRED: NORMAL
DLCO/VA PRED: NORMAL
DLCO/VA: NORMAL
DLCO: NORMAL
EXPIRATORY TIME-POST: NORMAL
EXPIRATORY TIME: NORMAL
FEF 25-75 %CHNG: NORMAL
FEF 25-75 POST %PRED: NORMAL
FEF 25-75% %PRED-PRE: NORMAL
FEF 25-75% PRED: NORMAL
FEF 25-75-POST: NORMAL
FEF 25-75-PRE: NORMAL
FEV1 %PRED-POST: NORMAL
FEV1 %PRED-PRE: NORMAL
FEV1 PRED: NORMAL
FEV1-POST: NORMAL
FEV1-PRE: NORMAL
FEV1/FVC %PRED-POST: NORMAL
FEV1/FVC %PRED-PRE: NORMAL
FEV1/FVC PRED: NORMAL
FEV1/FVC-POST: NORMAL
FEV1/FVC-PRE: NORMAL
FVC %PRED-POST: NORMAL
FVC %PRED-PRE: NORMAL
FVC PRED: NORMAL
FVC-POST: NORMAL
FVC-PRE: NORMAL
GAW %PRED: NORMAL
GAW PRED: NORMAL
GAW: NORMAL
IC PRE %PRED: NORMAL
IC PRED: NORMAL
IC: NORMAL
MEP: NORMAL
MIP: NORMAL
MVV %PRED-PRE: NORMAL
MVV PRED: NORMAL
MVV-PRE: NORMAL
PEF %PRED-POST: NORMAL
PEF %PRED-PRE: NORMAL
PEF PRED: NORMAL
PEF%CHNG: NORMAL
PEF-POST: NORMAL
PEF-PRE: NORMAL
RAW %PRED: NORMAL
RAW PRED: NORMAL
RAW: NORMAL
RV PRE %PRED: NORMAL
RV PRED: NORMAL
RV: NORMAL
SVC %PRED: NORMAL
SVC PRED: NORMAL
SVC: NORMAL
TLC PRE %PRED: NORMAL
TLC PRED: NORMAL
TLC: NORMAL
VA %PRED: NORMAL
VA PRED: NORMAL
VA: NORMAL
VTG %PRED: NORMAL
VTG PRED: NORMAL
VTG: NORMAL

## 2024-11-06 PROCEDURE — 3074F SYST BP LT 130 MM HG: CPT | Performed by: SURGERY

## 2024-11-06 PROCEDURE — 3017F COLORECTAL CA SCREEN DOC REV: CPT | Performed by: SURGERY

## 2024-11-06 PROCEDURE — 99204 OFFICE O/P NEW MOD 45 MIN: CPT | Performed by: SURGERY

## 2024-11-06 PROCEDURE — 94729 DIFFUSING CAPACITY: CPT

## 2024-11-06 PROCEDURE — 94060 EVALUATION OF WHEEZING: CPT

## 2024-11-06 PROCEDURE — G8400 PT W/DXA NO RESULTS DOC: HCPCS | Performed by: SURGERY

## 2024-11-06 PROCEDURE — 94726 PLETHYSMOGRAPHY LUNG VOLUMES: CPT

## 2024-11-06 PROCEDURE — 1090F PRES/ABSN URINE INCON ASSESS: CPT | Performed by: SURGERY

## 2024-11-06 PROCEDURE — 94640 AIRWAY INHALATION TREATMENT: CPT

## 2024-11-06 PROCEDURE — G8417 CALC BMI ABV UP PARAM F/U: HCPCS | Performed by: SURGERY

## 2024-11-06 PROCEDURE — 1159F MED LIST DOCD IN RCRD: CPT | Performed by: SURGERY

## 2024-11-06 PROCEDURE — 6370000000 HC RX 637 (ALT 250 FOR IP): Performed by: INTERNAL MEDICINE

## 2024-11-06 PROCEDURE — G8427 DOCREV CUR MEDS BY ELIG CLIN: HCPCS | Performed by: SURGERY

## 2024-11-06 PROCEDURE — 1036F TOBACCO NON-USER: CPT | Performed by: SURGERY

## 2024-11-06 PROCEDURE — 1126F AMNT PAIN NOTED NONE PRSNT: CPT | Performed by: SURGERY

## 2024-11-06 PROCEDURE — 1124F ACP DISCUSS-NO DSCNMKR DOCD: CPT | Performed by: SURGERY

## 2024-11-06 PROCEDURE — 3078F DIAST BP <80 MM HG: CPT | Performed by: SURGERY

## 2024-11-06 PROCEDURE — G8484 FLU IMMUNIZE NO ADMIN: HCPCS | Performed by: SURGERY

## 2024-11-06 RX ORDER — ALBUTEROL SULFATE 90 UG/1
4 INHALANT RESPIRATORY (INHALATION) ONCE
Status: COMPLETED | OUTPATIENT
Start: 2024-11-06 | End: 2024-11-06

## 2024-11-06 RX ADMIN — ALBUTEROL SULFATE 4 PUFF: 90 AEROSOL, METERED RESPIRATORY (INHALATION) at 11:13

## 2024-11-06 NOTE — TELEPHONE ENCOUNTER
Patient called back. She does not want to have 2 separate bills at 2 separate places She would like Dr. Aly to do the EGD and the CS with possible hemorrhoid banding. I explained I will talk to Dr. Aly tomorrow as he has already left for the day and will call her back if he is ok with doing both. She verbalized understanding.

## 2024-11-06 NOTE — PROGRESS NOTES
Reason for evaluation: hemorrhoids, hx IBS, paraesophageal hernia, indigestion       Patient saw Sheba Baires NP at [facility] on 10/23/2024:    She had a Fl therapeutic barium enema on 2/8/2024:  DISCUSSION:   Esophagus: Narrowing of the distal esophagus secondary to large paraesophageal hernia.  This could not be further evaluated as patient could not tolerate a tablet.  No mucosal lesion identified.  Mild dysmotility and moderate gastroesophageal reflux to   the level of the mid esophagus.   Stomach: No lesion identified   Duodenum: Normal configuration, with normal mucosal fold pattern.     Small bowel: Normal transit time into the colon of 90 minutes.  Normal fold pattern.  No mucosal lesion identified.  No evidence of stricture at the ileocecal junction.  No separation of bowel loops.     Patient is scheduled for an EGD with Dr. Aguirre on 12/10/2024.    EGD  Nausea: no  Vomiting: no  Heartburn:no  Dysphagia:no  Hematemesis:no  Epigastric pain:no  Anemia: yes  Previous work up date:EGD done in [place] 5 yrs ago:  Current Treatment:Omeprazole 30 mg daily and pepcid 20 mg nightly (but hasn't been taking it the past couple nights due to throat feeling like its closing)      Colonoscopy  Abd pain: no  Anemia: yes  Bloating:no  Diarrhea: no  Constipation: yes, hx of IBS, will have 3-4 x a week, does take stool softeners as needed, no fiber   Melena: no  Hematochezia:no  Rectal Bleeding:yes, with wiping, BRB  Rectal/Anal Pain:no  Pruritus: no  Family history colon Cancer: no  Previous colon cancer: no  Previous Colon Polyp: yes  Change in bowels: no  Decrease caliber of stool: yes  Change in color of stool: no  Previous work up date: Colonoscopy done in [place] 5 yrs ago: unsure what they found, said diverticulosis  
bowel syndrome with diarrhea     Low back pain     Lumbar radiculopathy     Major depressive disorder with single episode     MRSA (methicillin resistant Staphylococcus aureus) infection 07/10/2023    LAYTON (obstructive sleep apnea) 12/06/2017    Osteoarthritis     Panic disorder with agoraphobia     Sciatica     Thrombosed hemorrhoids      Current Outpatient Medications on File Prior to Visit   Medication Sig Dispense Refill    famotidine (PEPCID) 20 MG tablet Take 1 tablet by mouth 2 times daily      fexofenadine (ALLEGRA) 180 MG tablet Take 1 tablet (180 mg total) by mouth daily.      ACETYLCYSTEINE PO Take 600 mg by mouth      aluminum-magnesium hydroxide 200-200 MG/5ML suspension Take by mouth every 6 hours as needed for Indigestion      guaiFENesin (MUCINEX) 600 MG extended release tablet Take 2 tablets by mouth 2 times daily      lidocaine (LIDODERM) 5 % Place 1 patch onto the skin daily 12 hours on, 12 hours off.      montelukast (SINGULAIR) 10 MG tablet TAKE ONE TABLET BY MOUTH ONCE DAILY AT NIGHT 90 tablet 3    fluticasone-salmeterol (ADVAIR) 500-50 MCG/ACT AEPB diskus inhaler Inhale 1 puff into the lungs 2 times daily 3 each 6    ipratropium 0.5 mg-albuterol 2.5 mg (DUONEB) 0.5-2.5 (3) MG/3ML SOLN nebulizer solution INHALE THE CONTENTS OF 1 VIAL VIA NEBULIZER EVERY 6 HOURS AS NEEDED FOR WHEEZING OR SHORTNESS OF BREATH 360 mL 11    albuterol sulfate HFA (PROVENTIL;VENTOLIN;PROAIR) 108 (90 Base) MCG/ACT inhaler Inhale 2 puffs into the lungs every 4 hours as needed for Wheezing 25.5 g 0    OMEPRAZOLE PO Take 30 mg by mouth daily      benzonatate (TESSALON) 100 MG capsule Take 1 capsule by mouth 3 times daily as needed for Cough with increased fluids 90 capsule 0    PROCTO-MED HC 2.5 % CREA rectal cream APPLY TO THE AFFECTED AREA(S) rectally FOUR times daily      GNP BUDESONIDE NASAL SPRAY NA by Nasal route as needed      OXYGEN as directed      lisinopril (PRINIVIL;ZESTRIL) 30 MG tablet TAKE ONE TABLET ONCE

## 2024-11-06 NOTE — TELEPHONE ENCOUNTER
Dr. Aly spoke with Sheba Baires NP regarding Dr. Aguirre doing Colonoscopy with possible hemorrhoid banding while he did the EGD on 12/10/2024. Sheba informed Dr. Aly they would like him to do it. Left message on voicemail for patient to call back to schedule CS with possible hemorrhoid banding with Dr. Aly - will keep EGD with Dr. Aguirre as scheduled. Clinic number provided on voicemail..

## 2024-11-06 NOTE — PATIENT INSTRUCTIONS
Continue Omeprazole daily. Stop Pepcid at night.    Keep EGD with Dr. Aguirre on 12/10/2024. Our office will reach out to Dr. Aguirre's office to see if they will do the colonoscopy with possible hemorrhoid banding at the same time.

## 2024-11-07 ENCOUNTER — PREP FOR PROCEDURE (OUTPATIENT)
Dept: SURGERY | Age: 69
End: 2024-11-07

## 2024-11-07 DIAGNOSIS — K62.5 RECTAL BLEEDING: ICD-10-CM

## 2024-11-07 DIAGNOSIS — K44.9 PARAESOPHAGEAL HERNIA: ICD-10-CM

## 2024-11-07 PROBLEM — K59.00 CONSTIPATION: Status: ACTIVE | Noted: 2024-11-07

## 2024-11-07 NOTE — TELEPHONE ENCOUNTER
Dr. Kline office called back- they are ok with Dr. Aly doing the EGD. Will cancel her procedure on 12/10 with Dr. Aguirre. Contacted patient she would like to schedule her EGD and CS with possible hemorrhoid banding with Dr. Aly in the spring- scheduled 3/18/2025. Mailed instructions to patient.

## 2024-11-07 NOTE — PROCEDURES
[facility]                [address] [place] [address]                           PULMONARY FUNCTION      PATIENT NAME: ANTWAN GARCIA                 : 1955  MED REC NO: [id number]                         ROOM:   ACCOUNT NO: [id number]                       ADMIT DATE: 2024  PROVIDER: Derick Scott MD      DATE OF PROCEDURE: 2024    SURGEON:  Derick Scott MD    REFERRING PHYSICIAN:  SEBAS KENDALL    Spirometry shows an obstructive flow volume loop.  FEV1 78% predicted, with 4% bronchodilator changed to 81% predicted.  FVC 86% predicted, with no bronchodilator change.  FEV1/FVC ratio 69, postbronchodilator 72.    Lung volume by body box, mild hyperinflation, % predicted.  % predicted.  Diffusion capacity normal at 82% predicted, uncorrected.  Airway resistance increased.    FINAL IMPRESSION:  Mild obstructive ventilatory dysfunction with a bronchodilator response that does not meet ATS criteria, but a clinical trial is recommended.  There is mild hyperinflation.  Clinical correlation advised.          DERICK SCOTT MD      D:  2024 21:03:25     T:  2024 03:07:06     /XIOMARA  Job #:  [id number]     Doc#:  [id number]

## 2024-11-07 NOTE — TELEPHONE ENCOUNTER
Spoke with Dr. Aly- he is okay with doing EGD and CS with possible hemorrhoid banding- need to check with Dr. Aguirre's office if they are okay with us doing the EGD since patient is scheduled to have her EGD done there on 12/10/2024. Clinic number provided on Dr. Aguirre's office voicemail to the nurses regarding this asking for a call back.

## 2024-12-09 ENCOUNTER — HOSPITAL ENCOUNTER (OUTPATIENT)
Age: 69
Discharge: HOME | End: 2024-12-09
Payer: MEDICARE

## 2024-12-09 DIAGNOSIS — D80.1 HYPOGAMMAGLOBULINEMIA (HCC): ICD-10-CM

## 2024-12-09 DIAGNOSIS — D47.2 MGUS (MONOCLONAL GAMMOPATHY OF UNKNOWN SIGNIFICANCE): ICD-10-CM

## 2024-12-09 LAB
BASOPHILS # BLD: 0.03 K/UL (ref 0–0.2)
BASOPHILS NFR BLD: 1 % (ref 0–2)
EOSINOPHIL # BLD: 0.19 K/UL (ref 0–0.44)
EOSINOPHILS RELATIVE PERCENT: 3 % (ref 1–4)
ERYTHROCYTE [DISTWIDTH] IN BLOOD BY AUTOMATED COUNT: 13.9 % (ref 11.8–14.4)
HCT VFR BLD AUTO: 37.4 % (ref 36.3–47.1)
HGB BLD-MCNC: 12.2 G/DL (ref 11.9–15.1)
IGA SERPL-MCNC: 87 MG/DL (ref 70–400)
IGG SERPL-MCNC: 585 MG/DL (ref 700–1600)
IGM SERPL-MCNC: 47 MG/DL (ref 40–230)
IMM GRANULOCYTES # BLD AUTO: <0.03 K/UL (ref 0–0.3)
IMM GRANULOCYTES NFR BLD: 0 %
LYMPHOCYTES NFR BLD: 1.87 K/UL (ref 1.1–3.7)
LYMPHOCYTES RELATIVE PERCENT: 33 % (ref 24–43)
MCH RBC QN AUTO: 31.1 PG (ref 25.2–33.5)
MCHC RBC AUTO-ENTMCNC: 32.6 G/DL (ref 25.2–33.5)
MCV RBC AUTO: 95.4 FL (ref 82.6–102.9)
MONOCYTES NFR BLD: 0.56 K/UL (ref 0.1–1.2)
MONOCYTES NFR BLD: 10 % (ref 3–12)
NEUTROPHILS NFR BLD: 53 % (ref 36–65)
NEUTS SEG NFR BLD: 2.93 K/UL (ref 1.5–8.1)
NRBC BLD-RTO: 0 PER 100 WBC
PLATELET # BLD AUTO: 307 K/UL (ref 138–453)
PMV BLD AUTO: 9.3 FL (ref 8.1–13.5)
RBC # BLD AUTO: 3.92 M/UL (ref 3.95–5.11)
WBC OTHER # BLD: 5.6 K/UL (ref 3.5–11.3)

## 2024-12-09 PROCEDURE — 36415 COLL VENOUS BLD VENIPUNCTURE: CPT

## 2024-12-09 PROCEDURE — 82784 ASSAY IGA/IGD/IGG/IGM EACH: CPT

## 2024-12-09 PROCEDURE — 85025 COMPLETE CBC W/AUTO DIFF WBC: CPT

## 2024-12-10 ENCOUNTER — TELEPHONE (OUTPATIENT)
Dept: ONCOLOGY | Age: 69
End: 2024-12-10

## 2024-12-10 NOTE — TELEPHONE ENCOUNTER
Last Appt:  9/16/2024  Next Appt:   12/16/2024  Med verified in Epic     Patient had labs completed yesterday IgG 585, she has a follow up with Onc on Monday. She is  wanting to schedule IVIG before the new year if possible.     Please advise

## 2024-12-16 ENCOUNTER — OFFICE VISIT (OUTPATIENT)
Dept: ONCOLOGY | Age: 69
End: 2024-12-16
Payer: MEDICARE

## 2024-12-16 VITALS
DIASTOLIC BLOOD PRESSURE: 78 MMHG | WEIGHT: 215.8 LBS | RESPIRATION RATE: 20 BRPM | SYSTOLIC BLOOD PRESSURE: 122 MMHG | OXYGEN SATURATION: 95 % | BODY MASS INDEX: 36.84 KG/M2 | TEMPERATURE: 97.5 F | HEIGHT: 64 IN | HEART RATE: 75 BPM

## 2024-12-16 DIAGNOSIS — D80.1 HYPOGAMMAGLOBULINEMIA (HCC): Primary | ICD-10-CM

## 2024-12-16 PROCEDURE — 3017F COLORECTAL CA SCREEN DOC REV: CPT | Performed by: INTERNAL MEDICINE

## 2024-12-16 PROCEDURE — G8400 PT W/DXA NO RESULTS DOC: HCPCS | Performed by: INTERNAL MEDICINE

## 2024-12-16 PROCEDURE — 1160F RVW MEDS BY RX/DR IN RCRD: CPT | Performed by: INTERNAL MEDICINE

## 2024-12-16 PROCEDURE — 1124F ACP DISCUSS-NO DSCNMKR DOCD: CPT | Performed by: INTERNAL MEDICINE

## 2024-12-16 PROCEDURE — 3074F SYST BP LT 130 MM HG: CPT | Performed by: INTERNAL MEDICINE

## 2024-12-16 PROCEDURE — 99214 OFFICE O/P EST MOD 30 MIN: CPT | Performed by: INTERNAL MEDICINE

## 2024-12-16 PROCEDURE — 1036F TOBACCO NON-USER: CPT | Performed by: INTERNAL MEDICINE

## 2024-12-16 PROCEDURE — 1159F MED LIST DOCD IN RCRD: CPT | Performed by: INTERNAL MEDICINE

## 2024-12-16 PROCEDURE — G8417 CALC BMI ABV UP PARAM F/U: HCPCS | Performed by: INTERNAL MEDICINE

## 2024-12-16 PROCEDURE — G8484 FLU IMMUNIZE NO ADMIN: HCPCS | Performed by: INTERNAL MEDICINE

## 2024-12-16 PROCEDURE — 1090F PRES/ABSN URINE INCON ASSESS: CPT | Performed by: INTERNAL MEDICINE

## 2024-12-16 PROCEDURE — G8427 DOCREV CUR MEDS BY ELIG CLIN: HCPCS | Performed by: INTERNAL MEDICINE

## 2024-12-16 PROCEDURE — 3078F DIAST BP <80 MM HG: CPT | Performed by: INTERNAL MEDICINE

## 2024-12-16 RX ORDER — SODIUM CHLORIDE 9 MG/ML
INJECTION, SOLUTION INTRAVENOUS CONTINUOUS
OUTPATIENT
Start: 2024-12-16

## 2024-12-16 RX ORDER — SODIUM CHLORIDE 0.9 % (FLUSH) 0.9 %
5-40 SYRINGE (ML) INJECTION PRN
OUTPATIENT
Start: 2024-12-16

## 2024-12-16 RX ORDER — ACETAMINOPHEN 325 MG/1
650 TABLET ORAL
OUTPATIENT
Start: 2024-12-16

## 2024-12-16 RX ORDER — SODIUM CHLORIDE 9 MG/ML
5-250 INJECTION, SOLUTION INTRAVENOUS PRN
OUTPATIENT
Start: 2024-12-16

## 2024-12-16 RX ORDER — FAMOTIDINE 10 MG/ML
20 INJECTION, SOLUTION INTRAVENOUS
OUTPATIENT
Start: 2024-12-16

## 2024-12-16 RX ORDER — DIPHENHYDRAMINE HCL 25 MG
25 TABLET ORAL ONCE
OUTPATIENT
Start: 2024-12-16 | End: 2024-12-16

## 2024-12-16 RX ORDER — ONDANSETRON 2 MG/ML
8 INJECTION INTRAMUSCULAR; INTRAVENOUS
OUTPATIENT
Start: 2024-12-16

## 2024-12-16 RX ORDER — MEPERIDINE HYDROCHLORIDE 50 MG/ML
12.5 INJECTION INTRAMUSCULAR; INTRAVENOUS; SUBCUTANEOUS PRN
OUTPATIENT
Start: 2024-12-16

## 2024-12-16 RX ORDER — DIPHENHYDRAMINE HYDROCHLORIDE 50 MG/ML
50 INJECTION INTRAMUSCULAR; INTRAVENOUS
OUTPATIENT
Start: 2024-12-16

## 2024-12-16 RX ORDER — ALBUTEROL SULFATE 90 UG/1
4 INHALANT RESPIRATORY (INHALATION) PRN
OUTPATIENT
Start: 2024-12-16

## 2024-12-16 RX ORDER — ACETAMINOPHEN 325 MG/1
650 TABLET ORAL ONCE
OUTPATIENT
Start: 2024-12-16 | End: 2024-12-16

## 2024-12-16 RX ORDER — HEPARIN SODIUM (PORCINE) LOCK FLUSH IV SOLN 100 UNIT/ML 100 UNIT/ML
500 SOLUTION INTRAVENOUS PRN
OUTPATIENT
Start: 2024-12-16

## 2024-12-16 RX ORDER — HYDROCORTISONE SODIUM SUCCINATE 100 MG/2ML
100 INJECTION INTRAMUSCULAR; INTRAVENOUS
OUTPATIENT
Start: 2024-12-16

## 2024-12-16 RX ORDER — EPINEPHRINE 1 MG/ML
0.3 INJECTION, SOLUTION, CONCENTRATE INTRAVENOUS PRN
OUTPATIENT
Start: 2024-12-16

## 2024-12-16 NOTE — PROGRESS NOTES
Patient ID: Thu Kaur, 1955, 2916, 69 y.o.  Referred by : Doug Sotelo MD  Reason for consultation:   Anemia  Iron deficiency status post IV iron infusion  Currently on 1 iron pill daily  MGUS  Hypogammaglobinemia  HISTORY OF PRESENT ILLNESS:    Hematologic history:  Mine is a 65-year-old female with history of chronic anemia was seen there initial consultation visit.  She reports that she has history of anemia for past several years and for past 2 years she has been receiving intermittent PRBC and iron transfusions from her primary care physician.  She reports her last transfusion was about in August 2019 prior to her vascular surgery.  She reports she has taken iron pills in the past but had significant GI side effects so she had stopped.  She reports history of IBS and also had upper endoscopy and colonoscopy in 2018 was negative for any active bleeding.  Her GI evaluation was done at [place].  Most recently her stool for occult blood was negative checked in March of this year.  Her CBC on 3/18/2020 showed hemoglobin of 9.9, WBC 10.2, platelets 431.  Her ferritin 13.3, iron 15, TIBC 366 and iron saturation 4% noted on 3/18/2020.    INTERVAL HISTORY:  Patient is returning for follow-up visit and to discuss lab results and further commissions.  She denied any unintentional weight loss night sweats fever chills.  Her recent IgA level dropped to 585.  She is planning to have travel in the near future.    Her knee surgery is not scheduled yet.  She is following with orthopedic surgery.  She denied any abdominal pain nausea vomiting diarrhea.  Recent lab work showed normal hemoglobin    During this visit patient's allergy, social, medical, surgical history and medications were reviewed and updated.    Past Medical History:   Diagnosis Date    Adjustment disorder with mixed emotional features 02/29/2020    Anemia 02/29/2020    Anxiety 02/29/2020    Asthma 03/03/2009    Benign neoplasm of stomach

## 2024-12-19 DIAGNOSIS — D47.2 MGUS (MONOCLONAL GAMMOPATHY OF UNKNOWN SIGNIFICANCE): ICD-10-CM

## 2024-12-19 DIAGNOSIS — D80.1 HYPOGAMMAGLOBULINEMIA (HCC): Primary | ICD-10-CM

## 2025-01-03 ENCOUNTER — HOSPITAL ENCOUNTER (OUTPATIENT)
Dept: INFUSION THERAPY | Age: 70
Discharge: HOME | End: 2025-01-03
Payer: MEDICARE

## 2025-01-03 VITALS
SYSTOLIC BLOOD PRESSURE: 129 MMHG | RESPIRATION RATE: 16 BRPM | HEART RATE: 66 BPM | DIASTOLIC BLOOD PRESSURE: 83 MMHG | OXYGEN SATURATION: 97 % | TEMPERATURE: 97.5 F

## 2025-01-03 DIAGNOSIS — D80.1 HYPOGAMMAGLOBULINEMIA (HCC): Primary | ICD-10-CM

## 2025-01-03 PROCEDURE — 6360000002 HC RX W HCPCS: Performed by: INTERNAL MEDICINE

## 2025-01-03 PROCEDURE — 2580000003 HC RX 258: Performed by: INTERNAL MEDICINE

## 2025-01-03 PROCEDURE — 96366 THER/PROPH/DIAG IV INF ADDON: CPT

## 2025-01-03 PROCEDURE — 96415 CHEMO IV INFUSION ADDL HR: CPT

## 2025-01-03 PROCEDURE — 96413 CHEMO IV INFUSION 1 HR: CPT

## 2025-01-03 PROCEDURE — 96365 THER/PROPH/DIAG IV INF INIT: CPT

## 2025-01-03 RX ORDER — MEPERIDINE HYDROCHLORIDE 50 MG/ML
12.5 INJECTION INTRAMUSCULAR; INTRAVENOUS; SUBCUTANEOUS PRN
OUTPATIENT
Start: 2025-01-26

## 2025-01-03 RX ORDER — SODIUM CHLORIDE 9 MG/ML
5-250 INJECTION, SOLUTION INTRAVENOUS PRN
Status: DISCONTINUED | OUTPATIENT
Start: 2025-01-03 | End: 2025-01-04 | Stop reason: HOSPADM

## 2025-01-03 RX ORDER — ACETAMINOPHEN 325 MG/1
650 TABLET ORAL
OUTPATIENT
Start: 2025-01-26

## 2025-01-03 RX ORDER — SODIUM CHLORIDE 9 MG/ML
5-250 INJECTION, SOLUTION INTRAVENOUS PRN
OUTPATIENT
Start: 2025-01-26

## 2025-01-03 RX ORDER — EPINEPHRINE 1 MG/ML
0.3 INJECTION, SOLUTION, CONCENTRATE INTRAVENOUS PRN
OUTPATIENT
Start: 2025-01-26

## 2025-01-03 RX ORDER — ACETAMINOPHEN 325 MG/1
650 TABLET ORAL ONCE
OUTPATIENT
Start: 2025-01-26 | End: 2025-01-26

## 2025-01-03 RX ORDER — DIPHENHYDRAMINE HCL 25 MG
25 TABLET ORAL ONCE
OUTPATIENT
Start: 2025-01-26 | End: 2025-01-26

## 2025-01-03 RX ORDER — SODIUM CHLORIDE 9 MG/ML
INJECTION, SOLUTION INTRAVENOUS CONTINUOUS
OUTPATIENT
Start: 2025-01-26

## 2025-01-03 RX ORDER — ONDANSETRON 2 MG/ML
8 INJECTION INTRAMUSCULAR; INTRAVENOUS
OUTPATIENT
Start: 2025-01-26

## 2025-01-03 RX ORDER — SODIUM CHLORIDE 0.9 % (FLUSH) 0.9 %
5-40 SYRINGE (ML) INJECTION PRN
OUTPATIENT
Start: 2025-01-26

## 2025-01-03 RX ORDER — HEPARIN 100 UNIT/ML
500 SYRINGE INTRAVENOUS PRN
OUTPATIENT
Start: 2025-01-26

## 2025-01-03 RX ORDER — HYDROCORTISONE SODIUM SUCCINATE 100 MG/2ML
100 INJECTION INTRAMUSCULAR; INTRAVENOUS
OUTPATIENT
Start: 2025-01-26

## 2025-01-03 RX ORDER — ALBUTEROL SULFATE 90 UG/1
4 INHALANT RESPIRATORY (INHALATION) PRN
OUTPATIENT
Start: 2025-01-26

## 2025-01-03 RX ORDER — DIPHENHYDRAMINE HYDROCHLORIDE 50 MG/ML
50 INJECTION INTRAMUSCULAR; INTRAVENOUS
OUTPATIENT
Start: 2025-01-26

## 2025-01-03 RX ADMIN — SODIUM CHLORIDE 30 ML/HR: 9 INJECTION, SOLUTION INTRAVENOUS at 07:52

## 2025-01-03 NOTE — PROGRESS NOTES
Patient arrived for IVIG infusion.  VSS as charted.  IV placed without complication.  Patient tolerated infusion well.  Patient left infusion center with all belongings and steady gate.  Started medication at 118mg/hr increased by 50 every 30 minutes to 268mg/hr.  Max rate is 470mg/hr. Patient will have appt with [facility] before deciding if having another infusion.

## 2025-01-12 RX ORDER — BENZONATATE 100 MG/1
CAPSULE ORAL
Qty: 90 CAPSULE | Refills: 0 | Status: SHIPPED | OUTPATIENT
Start: 2025-01-12

## 2025-03-11 ENCOUNTER — HOSPITAL ENCOUNTER (OUTPATIENT)
Age: 70
Discharge: HOME | End: 2025-03-11
Payer: MEDICARE

## 2025-03-11 DIAGNOSIS — D80.1 HYPOGAMMAGLOBULINEMIA: ICD-10-CM

## 2025-03-11 DIAGNOSIS — D47.2 MGUS (MONOCLONAL GAMMOPATHY OF UNKNOWN SIGNIFICANCE): ICD-10-CM

## 2025-03-11 LAB
ALBUMIN SERPL-MCNC: 4 G/DL (ref 3.5–5.2)
ALBUMIN/GLOB SERPL: 1.6 {RATIO} (ref 1–2.5)
ALP SERPL-CCNC: 126 U/L (ref 35–104)
ALT SERPL-CCNC: 17 U/L (ref 5–33)
ANION GAP SERPL CALCULATED.3IONS-SCNC: 8 MMOL/L (ref 9–17)
AST SERPL-CCNC: 19 U/L
BASOPHILS # BLD: 0.04 K/UL (ref 0–0.2)
BASOPHILS NFR BLD: 1 % (ref 0–2)
BILIRUB SERPL-MCNC: 0.8 MG/DL (ref 0.3–1.2)
BUN SERPL-MCNC: 9 MG/DL (ref 8–23)
BUN/CREAT SERPL: 18 (ref 9–20)
CALCIUM SERPL-MCNC: 9.2 MG/DL (ref 8.6–10.4)
CHLORIDE SERPL-SCNC: 108 MMOL/L (ref 98–107)
CO2 SERPL-SCNC: 25 MMOL/L (ref 20–31)
CREAT SERPL-MCNC: 0.5 MG/DL (ref 0.5–0.9)
EOSINOPHIL # BLD: 0.19 K/UL (ref 0–0.44)
EOSINOPHILS RELATIVE PERCENT: 3 % (ref 1–4)
ERYTHROCYTE [DISTWIDTH] IN BLOOD BY AUTOMATED COUNT: 14.6 % (ref 11.8–14.4)
GFR, ESTIMATED: >90 ML/MIN/1.73M2
GLUCOSE SERPL-MCNC: 96 MG/DL (ref 70–99)
HCT VFR BLD AUTO: 38.7 % (ref 36.3–47.1)
HGB BLD-MCNC: 12.5 G/DL (ref 11.9–15.1)
IGA SERPL-MCNC: 92 MG/DL (ref 70–400)
IGG SERPL-MCNC: 674 MG/DL (ref 700–1600)
IGM SERPL-MCNC: 54 MG/DL (ref 40–230)
IMM GRANULOCYTES # BLD AUTO: <0.03 K/UL (ref 0–0.3)
IMM GRANULOCYTES NFR BLD: 0 %
LYMPHOCYTES NFR BLD: 1.43 K/UL (ref 1.1–3.7)
LYMPHOCYTES RELATIVE PERCENT: 25 % (ref 24–43)
MCH RBC QN AUTO: 30.3 PG (ref 25.2–33.5)
MCHC RBC AUTO-ENTMCNC: 32.3 G/DL (ref 25.2–33.5)
MCV RBC AUTO: 93.7 FL (ref 82.6–102.9)
MONOCYTES NFR BLD: 0.48 K/UL (ref 0.1–1.2)
MONOCYTES NFR BLD: 9 % (ref 3–12)
NEUTROPHILS NFR BLD: 62 % (ref 36–65)
NEUTS SEG NFR BLD: 3.46 K/UL (ref 1.5–8.1)
NRBC BLD-RTO: 0 PER 100 WBC
PLATELET # BLD AUTO: 317 K/UL (ref 138–453)
PMV BLD AUTO: 9.5 FL (ref 8.1–13.5)
POTASSIUM SERPL-SCNC: 4.7 MMOL/L (ref 3.7–5.3)
PROT SERPL-MCNC: 6.5 G/DL (ref 6.4–8.3)
RBC # BLD AUTO: 4.13 M/UL (ref 3.95–5.11)
RBC # BLD: ABNORMAL 10*6/UL
SODIUM SERPL-SCNC: 141 MMOL/L (ref 135–144)
WBC OTHER # BLD: 5.6 K/UL (ref 3.5–11.3)

## 2025-03-11 PROCEDURE — 85025 COMPLETE CBC W/AUTO DIFF WBC: CPT

## 2025-03-11 PROCEDURE — 82784 ASSAY IGA/IGD/IGG/IGM EACH: CPT

## 2025-03-11 PROCEDURE — 80053 COMPREHEN METABOLIC PANEL: CPT

## 2025-03-11 PROCEDURE — 36415 COLL VENOUS BLD VENIPUNCTURE: CPT

## 2025-03-12 ENCOUNTER — TELEPHONE (OUTPATIENT)
Dept: ONCOLOGY | Age: 70
End: 2025-03-12

## 2025-03-12 ENCOUNTER — TELEPHONE (OUTPATIENT)
Dept: INFUSION THERAPY | Age: 70
End: 2025-03-12

## 2025-03-12 NOTE — TELEPHONE ENCOUNTER
Patient is concerned about her colonoscopy being done on Tuesday with Dr. Aly.    She is having banding of her hemorrhages and is concerned about bleeding since she is having a infusion on Friday for iron.  She wants reassurance that this will not cause bleeding and mess with her iron levels.    Please advise

## 2025-03-12 NOTE — PROGRESS NOTES
Message to Dr. Wakefield regarding recent lab work 03/11/2025 Igg 674, Iga 92, Igm 54. Patient just returned from [place] and is requesting an IVIG appt. She sees you on 03/31/2025.  states okay to treat before his next appt with patient.

## 2025-03-14 ENCOUNTER — HOSPITAL ENCOUNTER (OUTPATIENT)
Dept: INFUSION THERAPY | Age: 70
Discharge: HOME | End: 2025-03-14
Payer: MEDICARE

## 2025-03-14 ENCOUNTER — TELEPHONE (OUTPATIENT)
Dept: INFUSION THERAPY | Age: 70
End: 2025-03-14

## 2025-03-14 VITALS
TEMPERATURE: 97.4 F | SYSTOLIC BLOOD PRESSURE: 145 MMHG | RESPIRATION RATE: 16 BRPM | OXYGEN SATURATION: 98 % | HEART RATE: 63 BPM | DIASTOLIC BLOOD PRESSURE: 84 MMHG

## 2025-03-14 DIAGNOSIS — D80.1 HYPOGAMMAGLOBULINEMIA: Primary | ICD-10-CM

## 2025-03-14 PROCEDURE — 96413 CHEMO IV INFUSION 1 HR: CPT

## 2025-03-14 PROCEDURE — 96366 THER/PROPH/DIAG IV INF ADDON: CPT

## 2025-03-14 PROCEDURE — 6360000002 HC RX W HCPCS: Performed by: INTERNAL MEDICINE

## 2025-03-14 PROCEDURE — 2580000003 HC RX 258: Performed by: INTERNAL MEDICINE

## 2025-03-14 PROCEDURE — 96415 CHEMO IV INFUSION ADDL HR: CPT

## 2025-03-14 PROCEDURE — 96365 THER/PROPH/DIAG IV INF INIT: CPT

## 2025-03-14 RX ORDER — ONDANSETRON 2 MG/ML
8 INJECTION INTRAMUSCULAR; INTRAVENOUS
OUTPATIENT
Start: 2025-03-25

## 2025-03-14 RX ORDER — HYDROCORTISONE SODIUM SUCCINATE 100 MG/2ML
100 INJECTION INTRAMUSCULAR; INTRAVENOUS
OUTPATIENT
Start: 2025-03-25

## 2025-03-14 RX ORDER — DIPHENHYDRAMINE HYDROCHLORIDE 50 MG/ML
50 INJECTION, SOLUTION INTRAMUSCULAR; INTRAVENOUS
OUTPATIENT
Start: 2025-03-25

## 2025-03-14 RX ORDER — SODIUM CHLORIDE 9 MG/ML
5-250 INJECTION, SOLUTION INTRAVENOUS PRN
OUTPATIENT
Start: 2025-03-25

## 2025-03-14 RX ORDER — DIPHENHYDRAMINE HCL 25 MG
25 TABLET ORAL ONCE
Status: DISCONTINUED | OUTPATIENT
Start: 2025-03-14 | End: 2025-03-15 | Stop reason: HOSPADM

## 2025-03-14 RX ORDER — DIPHENHYDRAMINE HCL 25 MG
25 TABLET ORAL ONCE
OUTPATIENT
Start: 2025-03-25 | End: 2025-03-25

## 2025-03-14 RX ORDER — HEPARIN 100 UNIT/ML
500 SYRINGE INTRAVENOUS PRN
OUTPATIENT
Start: 2025-03-25

## 2025-03-14 RX ORDER — SODIUM CHLORIDE 0.9 % (FLUSH) 0.9 %
5-40 SYRINGE (ML) INJECTION PRN
OUTPATIENT
Start: 2025-03-25

## 2025-03-14 RX ORDER — MEPERIDINE HYDROCHLORIDE 50 MG/ML
12.5 INJECTION INTRAMUSCULAR; INTRAVENOUS; SUBCUTANEOUS PRN
OUTPATIENT
Start: 2025-03-25

## 2025-03-14 RX ORDER — SODIUM CHLORIDE 9 MG/ML
5-250 INJECTION, SOLUTION INTRAVENOUS PRN
Status: DISCONTINUED | OUTPATIENT
Start: 2025-03-14 | End: 2025-03-15 | Stop reason: HOSPADM

## 2025-03-14 RX ORDER — ALBUTEROL SULFATE 90 UG/1
4 INHALANT RESPIRATORY (INHALATION) PRN
OUTPATIENT
Start: 2025-03-25

## 2025-03-14 RX ORDER — ACETAMINOPHEN 325 MG/1
650 TABLET ORAL ONCE
Status: DISCONTINUED | OUTPATIENT
Start: 2025-03-14 | End: 2025-03-15 | Stop reason: HOSPADM

## 2025-03-14 RX ORDER — SODIUM CHLORIDE 9 MG/ML
INJECTION, SOLUTION INTRAVENOUS CONTINUOUS
OUTPATIENT
Start: 2025-03-25

## 2025-03-14 RX ORDER — ACETAMINOPHEN 325 MG/1
650 TABLET ORAL
OUTPATIENT
Start: 2025-03-25

## 2025-03-14 RX ORDER — ACETAMINOPHEN 325 MG/1
650 TABLET ORAL ONCE
OUTPATIENT
Start: 2025-03-25 | End: 2025-03-25

## 2025-03-14 RX ORDER — EPINEPHRINE 1 MG/ML
0.3 INJECTION, SOLUTION, CONCENTRATE INTRAVENOUS PRN
OUTPATIENT
Start: 2025-03-25

## 2025-03-14 RX ADMIN — SODIUM CHLORIDE 30 ML/HR: 0.9 INJECTION, SOLUTION INTRAVENOUS at 08:15

## 2025-03-14 RX ADMIN — IMMUNE GLOBULIN (HUMAN) 40 G: 10 INJECTION INTRAVENOUS; SUBCUTANEOUS at 08:29

## 2025-03-14 NOTE — PROGRESS NOTES
Patient arrived for IVIG infusion.  VSS as charted.  IV placed without complication.  Patient tolerated infusion well.  Patient left infusion center with all belongings and steady gate.  New appt set for 4 weeks and printed out for patient.

## 2025-03-31 ENCOUNTER — OFFICE VISIT (OUTPATIENT)
Dept: ONCOLOGY | Age: 70
End: 2025-03-31
Payer: MEDICARE

## 2025-03-31 VITALS
RESPIRATION RATE: 16 BRPM | HEIGHT: 64 IN | SYSTOLIC BLOOD PRESSURE: 132 MMHG | TEMPERATURE: 97.5 F | HEART RATE: 82 BPM | DIASTOLIC BLOOD PRESSURE: 74 MMHG | WEIGHT: 219.8 LBS | OXYGEN SATURATION: 95 % | BODY MASS INDEX: 37.52 KG/M2

## 2025-03-31 DIAGNOSIS — D80.1 HYPOGAMMAGLOBULINEMIA: Primary | ICD-10-CM

## 2025-03-31 DIAGNOSIS — D50.0 IRON DEFICIENCY ANEMIA DUE TO CHRONIC BLOOD LOSS: ICD-10-CM

## 2025-03-31 PROCEDURE — 3078F DIAST BP <80 MM HG: CPT | Performed by: INTERNAL MEDICINE

## 2025-03-31 PROCEDURE — G8400 PT W/DXA NO RESULTS DOC: HCPCS | Performed by: INTERNAL MEDICINE

## 2025-03-31 PROCEDURE — 99213 OFFICE O/P EST LOW 20 MIN: CPT | Performed by: INTERNAL MEDICINE

## 2025-03-31 PROCEDURE — 1160F RVW MEDS BY RX/DR IN RCRD: CPT | Performed by: INTERNAL MEDICINE

## 2025-03-31 PROCEDURE — 1036F TOBACCO NON-USER: CPT | Performed by: INTERNAL MEDICINE

## 2025-03-31 PROCEDURE — 99204 OFFICE O/P NEW MOD 45 MIN: CPT | Performed by: INTERNAL MEDICINE

## 2025-03-31 PROCEDURE — G8417 CALC BMI ABV UP PARAM F/U: HCPCS | Performed by: INTERNAL MEDICINE

## 2025-03-31 PROCEDURE — 3075F SYST BP GE 130 - 139MM HG: CPT | Performed by: INTERNAL MEDICINE

## 2025-03-31 PROCEDURE — 1124F ACP DISCUSS-NO DSCNMKR DOCD: CPT | Performed by: INTERNAL MEDICINE

## 2025-03-31 PROCEDURE — 1090F PRES/ABSN URINE INCON ASSESS: CPT | Performed by: INTERNAL MEDICINE

## 2025-03-31 PROCEDURE — G8427 DOCREV CUR MEDS BY ELIG CLIN: HCPCS | Performed by: INTERNAL MEDICINE

## 2025-03-31 PROCEDURE — 1159F MED LIST DOCD IN RCRD: CPT | Performed by: INTERNAL MEDICINE

## 2025-03-31 PROCEDURE — 3017F COLORECTAL CA SCREEN DOC REV: CPT | Performed by: INTERNAL MEDICINE

## 2025-03-31 NOTE — PROGRESS NOTES
Patient ID: Thu Kaur, 1955, 2916, 70 y.o.  Referred by : Doug Sotelo MD  Reason for consultation:   Anemia  Iron deficiency status post IV iron infusion  Currently on 1 iron pill daily  MGUS  Hypogammaglobinemia  HISTORY OF PRESENT ILLNESS:    Hematologic history:  Mine is a 65-year-old female with history of chronic anemia was seen there initial consultation visit.  She reports that she has history of anemia for past several years and for past 2 years she has been receiving intermittent PRBC and iron transfusions from her primary care physician.  She reports her last transfusion was about in August 2019 prior to her vascular surgery.  She reports she has taken iron pills in the past but had significant GI side effects so she had stopped.  She reports history of IBS and also had upper endoscopy and colonoscopy in 2018 was negative for any active bleeding.  Her GI evaluation was done at [place].  Most recently her stool for occult blood was negative checked in March of this year.  Her CBC on 3/18/2020 showed hemoglobin of 9.9, WBC 10.2, platelets 431.  Her ferritin 13.3, iron 15, TIBC 366 and iron saturation 4% noted on 3/18/2020.    INTERVAL HISTORY:  Patient is returning for follow-up visit and to discuss lab results and further recommendations.  Her last IgG level was around 675.  She received IVIG infusion last month.      She denied any chest pain shortness of breath, new infection.  Her last pneumonia infection was in December.    She is planning to have a colonoscopy next month.  During this visit patient's allergy, social, medical, surgical history and medications were reviewed and updated.    Past Medical History:   Diagnosis Date    Adjustment disorder with mixed emotional features 02/29/2020    Anemia 02/29/2020    Anxiety 02/29/2020    Asthma 03/03/2009    Benign neoplasm of stomach     Chronic airway obstruction (HCC)     Chronic idiopathic constipation     Chronic pain     Chronic

## 2025-04-09 ENCOUNTER — HOSPITAL ENCOUNTER (OUTPATIENT)
Age: 70
Discharge: HOME | End: 2025-04-09
Payer: MEDICARE

## 2025-04-09 DIAGNOSIS — D50.0 IRON DEFICIENCY ANEMIA DUE TO CHRONIC BLOOD LOSS: ICD-10-CM

## 2025-04-09 DIAGNOSIS — D80.1 HYPOGAMMAGLOBULINEMIA: ICD-10-CM

## 2025-04-09 DIAGNOSIS — D47.2 MGUS (MONOCLONAL GAMMOPATHY OF UNKNOWN SIGNIFICANCE): ICD-10-CM

## 2025-04-09 LAB
ALBUMIN SERPL-MCNC: 4 G/DL (ref 3.5–5.2)
ALBUMIN/GLOB SERPL: 1.5 {RATIO} (ref 1–2.5)
ALP SERPL-CCNC: 126 U/L (ref 35–104)
ALT SERPL-CCNC: 23 U/L (ref 10–35)
ANION GAP SERPL CALCULATED.3IONS-SCNC: 10 MMOL/L (ref 9–16)
AST SERPL-CCNC: 23 U/L (ref 10–35)
BASOPHILS # BLD: 0.05 K/UL (ref 0–0.2)
BASOPHILS NFR BLD: 1 % (ref 0–2)
BILIRUB SERPL-MCNC: 0.6 MG/DL (ref 0–1.2)
BUN SERPL-MCNC: 16 MG/DL (ref 8–23)
BUN/CREAT SERPL: 23 (ref 9–20)
CALCIUM SERPL-MCNC: 9.6 MG/DL (ref 8.6–10.4)
CHLORIDE SERPL-SCNC: 105 MMOL/L (ref 98–107)
CO2 SERPL-SCNC: 25 MMOL/L (ref 20–31)
CREAT SERPL-MCNC: 0.7 MG/DL (ref 0.6–0.9)
EOSINOPHIL # BLD: 0.31 K/UL (ref 0–0.44)
EOSINOPHILS RELATIVE PERCENT: 5 % (ref 1–4)
ERYTHROCYTE [DISTWIDTH] IN BLOOD BY AUTOMATED COUNT: 14.7 % (ref 11.8–14.4)
GFR, ESTIMATED: >90 ML/MIN/1.73M2
GLUCOSE SERPL-MCNC: 105 MG/DL (ref 74–99)
HCT VFR BLD AUTO: 36.5 % (ref 36.3–47.1)
HGB BLD-MCNC: 11.5 G/DL (ref 11.9–15.1)
IGA SERPL-MCNC: 107 MG/DL (ref 70–400)
IGG SERPL-MCNC: 818 MG/DL (ref 700–1600)
IGM SERPL-MCNC: 46 MG/DL (ref 40–230)
IMM GRANULOCYTES # BLD AUTO: <0.03 K/UL (ref 0–0.3)
IMM GRANULOCYTES NFR BLD: 0 %
LYMPHOCYTES NFR BLD: 1.61 K/UL (ref 1.1–3.7)
LYMPHOCYTES RELATIVE PERCENT: 26 % (ref 24–43)
MCH RBC QN AUTO: 29.5 PG (ref 25.2–33.5)
MCHC RBC AUTO-ENTMCNC: 31.5 G/DL (ref 25.2–33.5)
MCV RBC AUTO: 93.6 FL (ref 82.6–102.9)
MONOCYTES NFR BLD: 0.55 K/UL (ref 0.1–1.2)
MONOCYTES NFR BLD: 9 % (ref 3–12)
NEUTROPHILS NFR BLD: 59 % (ref 36–65)
NEUTS SEG NFR BLD: 3.7 K/UL (ref 1.5–8.1)
NRBC BLD-RTO: 0 PER 100 WBC
PLATELET # BLD AUTO: 369 K/UL (ref 138–453)
PMV BLD AUTO: 9.2 FL (ref 8.1–13.5)
POTASSIUM SERPL-SCNC: 4.6 MMOL/L (ref 3.7–5.3)
PROT SERPL-MCNC: 6.7 G/DL (ref 6.6–8.7)
RBC # BLD AUTO: 3.9 M/UL (ref 3.95–5.11)
RBC # BLD: ABNORMAL 10*6/UL
SODIUM SERPL-SCNC: 140 MMOL/L (ref 136–145)
WBC OTHER # BLD: 6.2 K/UL (ref 3.5–11.3)

## 2025-04-09 PROCEDURE — 80053 COMPREHEN METABOLIC PANEL: CPT

## 2025-04-09 PROCEDURE — 82784 ASSAY IGA/IGD/IGG/IGM EACH: CPT

## 2025-04-09 PROCEDURE — 36415 COLL VENOUS BLD VENIPUNCTURE: CPT

## 2025-04-09 PROCEDURE — 85025 COMPLETE CBC W/AUTO DIFF WBC: CPT

## 2025-04-11 ENCOUNTER — TELEPHONE (OUTPATIENT)
Dept: INFUSION THERAPY | Age: 70
End: 2025-04-11

## 2025-04-17 NOTE — H&P
Thu Kaur is a 69 y.o. female        CC:     Paraesophageal hernia   hemorroids     HISTORY OF PRESENT ILLNESS:     Pt is a 70 yo F a lot of respiratory infections over the last winter.  She had an x-ray that showed a large paraesophageal hernia.  She then had an esophagram with results below showing narrowing of the distal esophagus secondary to a large paraesophageal hernia.  She was sent to GI Dr. Aguirre and broke more saw her they are setting her up for an EGD and a surgical consult.  She also complained of hemorrhoids that were bleeding and then come out and have to be pushed back in.  She does have some bleeding from them that are fairly severe at times but her hemoglobin is stayed around 12.  She is due for colonoscopy and 2025 for screening.        Reason for evaluation: hemorrhoids, hx IBS, paraesophageal hernia, indigestion                   Patient saw Sheba Baires NP at [facility] on 10/23/2024:     She had a Fl therapeutic barium enema on 2/8/2024:  DISCUSSION:   Esophagus: Narrowing of the distal esophagus secondary to large paraesophageal hernia.  This could not be further evaluated as patient could not tolerate a tablet.  No mucosal lesion identified.  Mild dysmotility and moderate gastroesophageal reflux to   the level of the mid esophagus.   Stomach: No lesion identified   Duodenum: Normal configuration, with normal mucosal fold pattern.     Small bowel: Normal transit time into the colon of 90 minutes.  Normal fold pattern.  No mucosal lesion identified.  No evidence of stricture at the ileocecal junction.  No separation of bowel loops.      Patient is scheduled for an EGD with Dr. Aguirre on 12/10/2024.     EGD  Nausea: no  Vomiting: no  Heartburn:no  Dysphagia:no  Hematemesis:no  Epigastric pain:no  Anemia: yes  Previous work up date:EGD done in [place] 5 yrs ago:  Current Treatment:Omeprazole 30 mg daily and pepcid 20 mg nightly (but hasn't been taking it the past couple nights due to

## 2025-04-23 ENCOUNTER — ANESTHESIA EVENT (OUTPATIENT)
Dept: OPERATING ROOM | Age: 70
End: 2025-04-23
Payer: MEDICARE

## 2025-04-23 ENCOUNTER — ANESTHESIA (OUTPATIENT)
Dept: OPERATING ROOM | Age: 70
End: 2025-04-23
Payer: MEDICARE

## 2025-04-23 ENCOUNTER — HOSPITAL ENCOUNTER (OUTPATIENT)
Age: 70
Setting detail: OUTPATIENT SURGERY
Discharge: HOME | End: 2025-04-23
Attending: SURGERY | Admitting: SURGERY
Payer: MEDICARE

## 2025-04-23 VITALS
OXYGEN SATURATION: 98 % | BODY MASS INDEX: 38.98 KG/M2 | RESPIRATION RATE: 16 BRPM | WEIGHT: 220 LBS | HEIGHT: 63 IN | DIASTOLIC BLOOD PRESSURE: 81 MMHG | HEART RATE: 70 BPM | TEMPERATURE: 98 F | SYSTOLIC BLOOD PRESSURE: 148 MMHG

## 2025-04-23 DIAGNOSIS — K62.5 RECTAL BLEEDING: ICD-10-CM

## 2025-04-23 DIAGNOSIS — K44.9 PARAESOPHAGEAL HERNIA: ICD-10-CM

## 2025-04-23 DIAGNOSIS — K59.00 CONSTIPATION: ICD-10-CM

## 2025-04-23 PROCEDURE — 88305 TISSUE EXAM BY PATHOLOGIST: CPT

## 2025-04-23 PROCEDURE — 3609010400 HC COLONOSCOPY POLYPECTOMY HOT BIOPSY: Performed by: SURGERY

## 2025-04-23 PROCEDURE — 7100000011 HC PHASE II RECOVERY - ADDTL 15 MIN: Performed by: SURGERY

## 2025-04-23 PROCEDURE — 7100000010 HC PHASE II RECOVERY - FIRST 15 MIN: Performed by: SURGERY

## 2025-04-23 PROCEDURE — 6360000002 HC RX W HCPCS: Performed by: NURSE ANESTHETIST, CERTIFIED REGISTERED

## 2025-04-23 PROCEDURE — 2580000003 HC RX 258: Performed by: SURGERY

## 2025-04-23 PROCEDURE — 3700000000 HC ANESTHESIA ATTENDED CARE: Performed by: SURGERY

## 2025-04-23 PROCEDURE — 2709999900 HC NON-CHARGEABLE SUPPLY: Performed by: SURGERY

## 2025-04-23 PROCEDURE — 3609012400 HC EGD TRANSORAL BIOPSY SINGLE/MULTIPLE: Performed by: SURGERY

## 2025-04-23 PROCEDURE — 88342 IMHCHEM/IMCYTCHM 1ST ANTB: CPT

## 2025-04-23 PROCEDURE — 3700000001 HC ADD 15 MINUTES (ANESTHESIA): Performed by: SURGERY

## 2025-04-23 RX ORDER — PROPOFOL 10 MG/ML
INJECTION, EMULSION INTRAVENOUS
Status: DISCONTINUED | OUTPATIENT
Start: 2025-04-23 | End: 2025-04-23 | Stop reason: SDUPTHER

## 2025-04-23 RX ORDER — SODIUM CHLORIDE 0.9 % (FLUSH) 0.9 %
5-40 SYRINGE (ML) INJECTION PRN
Status: DISCONTINUED | OUTPATIENT
Start: 2025-04-23 | End: 2025-04-23 | Stop reason: HOSPADM

## 2025-04-23 RX ORDER — SODIUM CHLORIDE, SODIUM LACTATE, POTASSIUM CHLORIDE, CALCIUM CHLORIDE 600; 310; 30; 20 MG/100ML; MG/100ML; MG/100ML; MG/100ML
INJECTION, SOLUTION INTRAVENOUS CONTINUOUS
Status: DISCONTINUED | OUTPATIENT
Start: 2025-04-23 | End: 2025-04-23 | Stop reason: HOSPADM

## 2025-04-23 RX ORDER — SODIUM CHLORIDE 0.9 % (FLUSH) 0.9 %
5-40 SYRINGE (ML) INJECTION EVERY 12 HOURS SCHEDULED
Status: DISCONTINUED | OUTPATIENT
Start: 2025-04-23 | End: 2025-04-23 | Stop reason: HOSPADM

## 2025-04-23 RX ORDER — LIDOCAINE HYDROCHLORIDE 40 MG/ML
INJECTION, SOLUTION RETROBULBAR
Status: DISCONTINUED | OUTPATIENT
Start: 2025-04-23 | End: 2025-04-23 | Stop reason: SDUPTHER

## 2025-04-23 RX ORDER — SODIUM CHLORIDE 9 MG/ML
25 INJECTION, SOLUTION INTRAVENOUS PRN
Status: DISCONTINUED | OUTPATIENT
Start: 2025-04-23 | End: 2025-04-23 | Stop reason: HOSPADM

## 2025-04-23 RX ADMIN — PROPOFOL 150 MCG/KG/MIN: 10 INJECTION, EMULSION INTRAVENOUS at 08:21

## 2025-04-23 RX ADMIN — SODIUM CHLORIDE, SODIUM LACTATE, POTASSIUM CHLORIDE, AND CALCIUM CHLORIDE: .6; .31; .03; .02 INJECTION, SOLUTION INTRAVENOUS at 07:33

## 2025-04-23 RX ADMIN — PROPOFOL 100 MG: 10 INJECTION, EMULSION INTRAVENOUS at 08:20

## 2025-04-23 RX ADMIN — LIDOCAINE HYDROCHLORIDE 60 MG: 40 INJECTION, SOLUTION RETROBULBAR; TOPICAL at 08:20

## 2025-04-23 ASSESSMENT — PAIN DESCRIPTION - PAIN TYPE
TYPE: SURGICAL PAIN
TYPE: SURGICAL PAIN

## 2025-04-23 ASSESSMENT — PAIN - FUNCTIONAL ASSESSMENT
PAIN_FUNCTIONAL_ASSESSMENT: 0-10
PAIN_FUNCTIONAL_ASSESSMENT: 0-10

## 2025-04-23 ASSESSMENT — PAIN SCALES - GENERAL
PAINLEVEL_OUTOF10: 6
PAINLEVEL_OUTOF10: 5

## 2025-04-23 ASSESSMENT — PAIN DESCRIPTION - DESCRIPTORS
DESCRIPTORS: PRESSURE
DESCRIPTORS: PRESSURE

## 2025-04-23 ASSESSMENT — COPD QUESTIONNAIRES: CAT_SEVERITY: MODERATE

## 2025-04-23 ASSESSMENT — PAIN DESCRIPTION - LOCATION
LOCATION: RECTUM
LOCATION: RECTUM

## 2025-04-23 NOTE — DISCHARGE INSTRUCTIONS
1.  Await biopsy results. Dr. Aly's office will call you with the results in 7-10  2.  Then make further recommendations.  (Says HH is not bothering her now) and main lower GI issue is the bleeding hemorrhoids.  Will see if banding helps and continue to recommend conservative treatment for hemorrhoids.    DISCHARGE INSTRUCTIONS FOLLOWING EGD & COLONOSCOPY    Activity  You have received sedation.  Your judgement and coordination may be impaired.  Do not drive or operate any machinery today.  Do not make personal, medical, legal or business decisions today.  Do not consume alcohol, tranquilizers or sleeping medications today unless otherwise instructed by your physician.  Rest today.  You may resume normal activity tomorrow.    Because air is put into your stomach and colon during these procedures, it is normal to belch, and pass large amounts of air from your rectum.  Feelings of bloating, gas or cramping may persist for 24 hours.  You may not have a bowel movement for 1-3 days after these procedures.    Diet  Begin with sips of clear liquids and if no nausea, you may progress to your normal diet, unless otherwise instructed.    Medications  You may resume your usual medications, unless otherwise instructed.    Follow-Up  As instructed.    CALL YOUR PHYSICIAN if you experience any of the following:  Bleeding from your rectum (a teaspoonful or more)  Severe abdominal pain/cramping and/or distention that is not relieved by passing gas.  Chest pain that is not relieved by belching.  Persistent nausea and vomiting.  Signs of infection including fever, chills, redness or swelling @ the IV site.    If you have questions/problems, call [phone] ([facility]) or after regular business hours call [phone] ([facility])  INSTRUCTIONS FOLLOWING  HEMORRHOID BANDING       Learning About Rubber Band Ligation for Hemorrhoids  What is rubber band ligation?     Rubber band ligation treats hemorrhoids.

## 2025-04-23 NOTE — ANESTHESIA POSTPROCEDURE EVALUATION
Department of Anesthesiology  Postprocedure Note    Patient: Thu Kaur  MRN: [id number]  YOB: 1955  Date of evaluation: 4/23/2025    Procedure Summary       Date: 04/23/25 Room / Location: [facility]    Anesthesia Start: 0805 Anesthesia Stop: 0859    Procedures:       ESOPHAGOGASTRODUODENOSCOPY BIOPSY      COLONOSCOPY POLYPECTOMY HOT BIOPSY AND HEMORRHOID BANDING X 2 Diagnosis:       Paraesophageal hernia      Rectal bleeding      Constipation      (Paraesophageal hernia [K44.9])      (Rectal bleeding [K62.5])      (Constipation [K59.00])    Surgeons: Bennie Aly MD Responsible Provider: Kyler Shah II, APRN - CRNA    Anesthesia Type: General, TIVA ASA Status: 3            Anesthesia Type: General, TIVA    Violeta Phase I: Violeta Score: 10    Violtea Phase II: Violeta Score: 10    Anesthesia Post Evaluation    Patient location during evaluation: bedside  Patient participation: complete - patient participated  Level of consciousness: awake  Pain score: 1  Airway patency: patent  Nausea & Vomiting: no nausea and no vomiting  Cardiovascular status: hemodynamically stable  Respiratory status: spontaneous ventilation  Hydration status: stable  Pain management: satisfactory to patient        No notable events documented.

## 2025-04-23 NOTE — OP NOTE
PROCEDURE NOTE    DATE OF PROCEDURE: 4/23/2025     SURGEON: Dr. Bennie Aly    ASSISTANT: None    PREOPERATIVE DIAGNOSIS:      Probable paraesophageal hernia with reflux and narrowing of distal esophagus  on esopahgram.  Bleeding hemorrhoids  Xray showed large paraesophageal HH. (During work up for respiratory issues  On prilosec daily and pepcid nightly   Last EGD 5 years ago in [place]  Rectal bleeding from hemorrhoids according to patient  No fam hx colon cancer  Last CS 5 yrs ago in [place], diverticulosis      OPERATION: 1.  Upper GI endoscopy with cold biopsy    2.  Colonoscopy with hot forceps    ANESTHESIA: IV sedation per CRNA.     ESTIMATED BLOOD LOSS: Less than 10 ml    COMPLICATIONS: None.       PROCEDURE # 1:  EGD    PROCEDURE: The patient was given IV conscious sedation per anesthesia. The patient was given 4 L of O2 /minute by nasal cannula.  The patient's SPO2 remained above 90% throughout the procedure. The gastroscope was inserted orally and advanced under direct vision through the esophagus, through the stomach, through the pylorus, and into the descending duodenum.  Random biopsies were taken in the antrum,body and distal esophagus. The scope was removed and the patient tolerated the procedure well.      Findings:    GE junction at 32 cm, c/w moderate sliding HH  Mild distal esophageal inflammation, no stricture  Stomach and duodenum fairly normal      PROCEDURE # 2:  COLONOSCOPY      PROCEDURE: The patient was given IV conscious sedation per anesthesia. The patient was given O2 by nasal cannula.  The patient's SPO2 remained above 90% throughout the procedure. The colonoscope was inserted per rectum and advanced under direct vision to the cecum without difficulty.  The prep was good so exam was adequate.  The colon was decompressed and the scope was removed.  The patient tolerated the procedure well.       Findings:    1.  Moderate diffuse diverticulosis  2.  8 mm polyps at right colon and 65 cm,

## 2025-04-23 NOTE — ANESTHESIA PRE PROCEDURE
Department of Anesthesiology  Preprocedure Note       Name:  Thu Kaur   Age:  70 y.o.  :  1955                                          MRN:  [id number]         Date:  2025      Surgeon: Surgeon(s):  Bennie Aly MD    Procedure: Procedure(s):  Esophagogastroduodenoscopy  Colonoscopy possible hemorrhoid banding and/or IRC    Medications prior to admission:   Prior to Admission medications    Medication Sig Start Date End Date Taking? Authorizing Provider   benzonatate (TESSALON) 100 MG capsule Take 1 capsule by mouth 3 times daily as needed for Cough with increased fluids 25   Maeve Brock MD   famotidine (PEPCID) 20 MG tablet Take 1 tablet by mouth 2 times daily    Laisha Moraes MD   fexofenadine (ALLEGRA) 180 MG tablet Take 1 tablet (180 mg total) by mouth daily.    Laisha Moraes MD   ACETYLCYSTEINE PO Take 600 mg by mouth  Patient not taking: Reported on 3/31/2025    Laisha Moraes MD   aluminum-magnesium hydroxide 200-200 MG/5ML suspension Take by mouth every 6 hours as needed for Indigestion  Patient not taking: Reported on 3/31/2025    Laisha Moraes MD   guaiFENesin (MUCINEX) 600 MG extended release tablet Take 2 tablets by mouth as needed    Laisha Moraes MD   lidocaine (LIDODERM) 5 % Place 1 patch onto the skin daily 12 hours on, 12 hours off.  Patient not taking: Reported on 3/31/2025    Laisha Moraes MD   montelukast (SINGULAIR) 10 MG tablet TAKE ONE TABLET BY MOUTH ONCE DAILY AT NIGHT 10/25/24   Maeve Brock MD   fluticasone-salmeterol (ADVAIR) 500-50 MCG/ACT AEPB diskus inhaler Inhale 1 puff into the lungs 2 times daily 10/25/24   Maeve Brock MD   ipratropium 0.5 mg-albuterol 2.5 mg (DUONEB) 0.5-2.5 (3) MG/3ML SOLN nebulizer solution INHALE THE CONTENTS OF 1 VIAL VIA NEBULIZER EVERY 6 HOURS AS NEEDED FOR WHEEZING OR SHORTNESS OF BREATH 10/25/24   Maeve Brock MD   albuterol sulfate HFA (PROVENTIL;VENTOLIN;PROAIR) 108

## 2025-04-24 LAB — SURGICAL PATHOLOGY REPORT: NORMAL

## 2025-05-12 ENCOUNTER — TELEPHONE (OUTPATIENT)
Dept: SURGERY | Age: 70
End: 2025-05-12

## 2025-05-12 NOTE — TELEPHONE ENCOUNTER
Letter created and mailed to patient with results from recent EGD and Colonoscopy at [facility] with Dr. Aly on 4-23-25. Updated history, health maintenance, and recall. Forwarded results to PCP.

## 2025-05-14 ENCOUNTER — TELEPHONE (OUTPATIENT)
Dept: INFUSION THERAPY | Age: 70
End: 2025-05-14

## 2025-05-14 NOTE — TELEPHONE ENCOUNTER
Writer spoke to Dr Wakefield regarding [employer] will not approve IVIG treatment for this diagnosis unless the patients IgG is < 600. Her last IgG was 818 on 04/09/2025. She has an appt on 06/04/2025 with Infusion. She has a Dr. Wakefield appt in July.  Dr. Wakefield states to wait till after labs to see if under 600 and then want to re-evaluate after labs for appt in July.  Dr. Wakefield is agreeable to this. Writer spoke to Thu who will have labs drawn next week and wants to wait to after that to cancel appt.

## 2025-05-20 ENCOUNTER — HOSPITAL ENCOUNTER (OUTPATIENT)
Age: 70
Discharge: HOME | End: 2025-05-20
Payer: MEDICARE

## 2025-05-20 DIAGNOSIS — D80.1 HYPOGAMMAGLOBULINEMIA: ICD-10-CM

## 2025-05-20 DIAGNOSIS — D47.2 MGUS (MONOCLONAL GAMMOPATHY OF UNKNOWN SIGNIFICANCE): ICD-10-CM

## 2025-05-20 LAB
ALBUMIN SERPL-MCNC: 4.1 G/DL (ref 3.5–5.2)
ALBUMIN/GLOB SERPL: 1.6 {RATIO} (ref 1–2.5)
ALP SERPL-CCNC: 126 U/L (ref 35–104)
ALT SERPL-CCNC: 19 U/L (ref 5–33)
ANION GAP SERPL CALCULATED.3IONS-SCNC: 12 MMOL/L (ref 9–17)
AST SERPL-CCNC: 19 U/L
BASOPHILS # BLD: 0.05 K/UL (ref 0–0.2)
BASOPHILS NFR BLD: 1 % (ref 0–2)
BILIRUB SERPL-MCNC: 0.7 MG/DL (ref 0.3–1.2)
BUN SERPL-MCNC: 15 MG/DL (ref 8–23)
BUN/CREAT SERPL: 25 (ref 9–20)
CALCIUM SERPL-MCNC: 9.4 MG/DL (ref 8.6–10.4)
CHLORIDE SERPL-SCNC: 106 MMOL/L (ref 98–107)
CO2 SERPL-SCNC: 24 MMOL/L (ref 20–31)
CREAT SERPL-MCNC: 0.6 MG/DL (ref 0.5–0.9)
EOSINOPHIL # BLD: 0.25 K/UL (ref 0–0.44)
EOSINOPHILS RELATIVE PERCENT: 4 % (ref 1–4)
ERYTHROCYTE [DISTWIDTH] IN BLOOD BY AUTOMATED COUNT: 14 % (ref 11.8–14.4)
GFR, ESTIMATED: >90 ML/MIN/1.73M2
GLUCOSE SERPL-MCNC: 117 MG/DL (ref 70–99)
HCT VFR BLD AUTO: 35.4 % (ref 36.3–47.1)
HGB BLD-MCNC: 10.9 G/DL (ref 11.9–15.1)
IGA SERPL-MCNC: 93 MG/DL (ref 70–400)
IGG SERPL-MCNC: 632 MG/DL (ref 700–1600)
IGM SERPL-MCNC: 40 MG/DL (ref 40–230)
IMM GRANULOCYTES # BLD AUTO: <0.03 K/UL (ref 0–0.3)
IMM GRANULOCYTES NFR BLD: 0 %
LYMPHOCYTES NFR BLD: 1.46 K/UL (ref 1.1–3.7)
LYMPHOCYTES RELATIVE PERCENT: 22 % (ref 24–43)
MCH RBC QN AUTO: 28.4 PG (ref 25.2–33.5)
MCHC RBC AUTO-ENTMCNC: 30.8 G/DL (ref 25.2–33.5)
MCV RBC AUTO: 92.2 FL (ref 82.6–102.9)
MONOCYTES NFR BLD: 0.54 K/UL (ref 0.1–1.2)
MONOCYTES NFR BLD: 8 % (ref 3–12)
NEUTROPHILS NFR BLD: 65 % (ref 36–65)
NEUTS SEG NFR BLD: 4.38 K/UL (ref 1.5–8.1)
NRBC BLD-RTO: 0 PER 100 WBC
PLATELET # BLD AUTO: 336 K/UL (ref 138–453)
PMV BLD AUTO: 8.9 FL (ref 8.1–13.5)
POTASSIUM SERPL-SCNC: 4.5 MMOL/L (ref 3.7–5.3)
PROT SERPL-MCNC: 6.7 G/DL (ref 6.4–8.3)
RBC # BLD AUTO: 3.84 M/UL (ref 3.95–5.11)
SODIUM SERPL-SCNC: 142 MMOL/L (ref 135–144)
WBC OTHER # BLD: 6.7 K/UL (ref 3.5–11.3)

## 2025-05-20 PROCEDURE — 82784 ASSAY IGA/IGD/IGG/IGM EACH: CPT

## 2025-05-20 PROCEDURE — 36415 COLL VENOUS BLD VENIPUNCTURE: CPT

## 2025-05-20 PROCEDURE — 85025 COMPLETE CBC W/AUTO DIFF WBC: CPT

## 2025-05-20 PROCEDURE — 80053 COMPREHEN METABOLIC PANEL: CPT

## 2025-06-04 ENCOUNTER — HOSPITAL ENCOUNTER (OUTPATIENT)
Dept: INFUSION THERAPY | Age: 70
Discharge: HOME | End: 2025-06-04
Payer: MEDICARE

## 2025-06-04 VITALS
RESPIRATION RATE: 16 BRPM | SYSTOLIC BLOOD PRESSURE: 143 MMHG | OXYGEN SATURATION: 96 % | HEART RATE: 70 BPM | DIASTOLIC BLOOD PRESSURE: 77 MMHG | TEMPERATURE: 97.8 F

## 2025-06-04 DIAGNOSIS — D80.1 HYPOGAMMAGLOBULINEMIA: Primary | ICD-10-CM

## 2025-06-04 PROCEDURE — 2580000003 HC RX 258: Performed by: INTERNAL MEDICINE

## 2025-06-04 PROCEDURE — 96366 THER/PROPH/DIAG IV INF ADDON: CPT

## 2025-06-04 PROCEDURE — 6360000002 HC RX W HCPCS: Performed by: INTERNAL MEDICINE

## 2025-06-04 PROCEDURE — 96365 THER/PROPH/DIAG IV INF INIT: CPT

## 2025-06-04 RX ORDER — ONDANSETRON 2 MG/ML
8 INJECTION INTRAMUSCULAR; INTRAVENOUS
OUTPATIENT
Start: 2025-06-17

## 2025-06-04 RX ORDER — SODIUM CHLORIDE 0.9 % (FLUSH) 0.9 %
5-40 SYRINGE (ML) INJECTION PRN
OUTPATIENT
Start: 2025-06-17

## 2025-06-04 RX ORDER — MEPERIDINE HYDROCHLORIDE 50 MG/ML
12.5 INJECTION INTRAMUSCULAR; INTRAVENOUS; SUBCUTANEOUS PRN
OUTPATIENT
Start: 2025-06-17

## 2025-06-04 RX ORDER — HYDROCORTISONE SODIUM SUCCINATE 100 MG/2ML
100 INJECTION INTRAMUSCULAR; INTRAVENOUS
OUTPATIENT
Start: 2025-06-17

## 2025-06-04 RX ORDER — ACETAMINOPHEN 325 MG/1
650 TABLET ORAL
OUTPATIENT
Start: 2025-06-17

## 2025-06-04 RX ORDER — SODIUM CHLORIDE 9 MG/ML
INJECTION, SOLUTION INTRAVENOUS CONTINUOUS
OUTPATIENT
Start: 2025-06-17

## 2025-06-04 RX ORDER — DIPHENHYDRAMINE HCL 25 MG
25 TABLET ORAL ONCE
OUTPATIENT
Start: 2025-06-17 | End: 2025-06-17

## 2025-06-04 RX ORDER — DIPHENHYDRAMINE HYDROCHLORIDE 50 MG/ML
50 INJECTION, SOLUTION INTRAMUSCULAR; INTRAVENOUS
OUTPATIENT
Start: 2025-06-17

## 2025-06-04 RX ORDER — SODIUM CHLORIDE 9 MG/ML
5-250 INJECTION, SOLUTION INTRAVENOUS PRN
Status: DISCONTINUED | OUTPATIENT
Start: 2025-06-04 | End: 2025-06-05 | Stop reason: HOSPADM

## 2025-06-04 RX ORDER — EPINEPHRINE 1 MG/ML
0.3 INJECTION, SOLUTION, CONCENTRATE INTRAVENOUS PRN
OUTPATIENT
Start: 2025-06-17

## 2025-06-04 RX ORDER — ALBUTEROL SULFATE 90 UG/1
4 INHALANT RESPIRATORY (INHALATION) PRN
OUTPATIENT
Start: 2025-06-17

## 2025-06-04 RX ORDER — ACETAMINOPHEN 325 MG/1
650 TABLET ORAL ONCE
OUTPATIENT
Start: 2025-06-17 | End: 2025-06-17

## 2025-06-04 RX ORDER — SODIUM CHLORIDE 9 MG/ML
5-250 INJECTION, SOLUTION INTRAVENOUS PRN
OUTPATIENT
Start: 2025-06-17

## 2025-06-04 RX ORDER — HEPARIN 100 UNIT/ML
500 SYRINGE INTRAVENOUS PRN
OUTPATIENT
Start: 2025-06-17

## 2025-06-04 RX ADMIN — SODIUM CHLORIDE 25 ML/HR: 0.9 INJECTION, SOLUTION INTRAVENOUS at 09:18

## 2025-06-04 RX ADMIN — IMMUNE GLOBULIN (HUMAN) 40 G: 10 INJECTION INTRAVENOUS; SUBCUTANEOUS at 09:19

## 2025-06-04 NOTE — PROGRESS NOTES
Infusion completed.  No complaints.  Discharged to home in stable condition.  Rescheduled for 4 weeks.

## 2025-06-19 ENCOUNTER — OFFICE VISIT (OUTPATIENT)
Dept: PULMONOLOGY | Age: 70
End: 2025-06-19
Payer: MEDICARE

## 2025-06-19 VITALS
WEIGHT: 222 LBS | HEART RATE: 76 BPM | RESPIRATION RATE: 22 BRPM | HEIGHT: 62 IN | TEMPERATURE: 97 F | SYSTOLIC BLOOD PRESSURE: 124 MMHG | BODY MASS INDEX: 40.85 KG/M2 | DIASTOLIC BLOOD PRESSURE: 76 MMHG | OXYGEN SATURATION: 99 %

## 2025-06-19 DIAGNOSIS — G47.33 OSA ON CPAP: Primary | ICD-10-CM

## 2025-06-19 DIAGNOSIS — J45.40 MODERATE PERSISTENT ASTHMA WITHOUT COMPLICATION: ICD-10-CM

## 2025-06-19 DIAGNOSIS — E66.813 CLASS 3 SEVERE OBESITY DUE TO EXCESS CALORIES WITH SERIOUS COMORBIDITY AND BODY MASS INDEX (BMI) OF 40.0 TO 44.9 IN ADULT (HCC): ICD-10-CM

## 2025-06-19 DIAGNOSIS — D80.1 HYPOGAMMAGLOBULINEMIA: ICD-10-CM

## 2025-06-19 PROCEDURE — 1124F ACP DISCUSS-NO DSCNMKR DOCD: CPT | Performed by: STUDENT IN AN ORGANIZED HEALTH CARE EDUCATION/TRAINING PROGRAM

## 2025-06-19 PROCEDURE — 3074F SYST BP LT 130 MM HG: CPT | Performed by: STUDENT IN AN ORGANIZED HEALTH CARE EDUCATION/TRAINING PROGRAM

## 2025-06-19 PROCEDURE — 99214 OFFICE O/P EST MOD 30 MIN: CPT | Performed by: STUDENT IN AN ORGANIZED HEALTH CARE EDUCATION/TRAINING PROGRAM

## 2025-06-19 PROCEDURE — 3017F COLORECTAL CA SCREEN DOC REV: CPT | Performed by: STUDENT IN AN ORGANIZED HEALTH CARE EDUCATION/TRAINING PROGRAM

## 2025-06-19 PROCEDURE — G8427 DOCREV CUR MEDS BY ELIG CLIN: HCPCS | Performed by: STUDENT IN AN ORGANIZED HEALTH CARE EDUCATION/TRAINING PROGRAM

## 2025-06-19 PROCEDURE — 1036F TOBACCO NON-USER: CPT | Performed by: STUDENT IN AN ORGANIZED HEALTH CARE EDUCATION/TRAINING PROGRAM

## 2025-06-19 PROCEDURE — G8417 CALC BMI ABV UP PARAM F/U: HCPCS | Performed by: STUDENT IN AN ORGANIZED HEALTH CARE EDUCATION/TRAINING PROGRAM

## 2025-06-19 PROCEDURE — G8400 PT W/DXA NO RESULTS DOC: HCPCS | Performed by: STUDENT IN AN ORGANIZED HEALTH CARE EDUCATION/TRAINING PROGRAM

## 2025-06-19 PROCEDURE — 1090F PRES/ABSN URINE INCON ASSESS: CPT | Performed by: STUDENT IN AN ORGANIZED HEALTH CARE EDUCATION/TRAINING PROGRAM

## 2025-06-19 PROCEDURE — 3078F DIAST BP <80 MM HG: CPT | Performed by: STUDENT IN AN ORGANIZED HEALTH CARE EDUCATION/TRAINING PROGRAM

## 2025-06-19 PROCEDURE — 1159F MED LIST DOCD IN RCRD: CPT | Performed by: STUDENT IN AN ORGANIZED HEALTH CARE EDUCATION/TRAINING PROGRAM

## 2025-06-19 RX ORDER — LORATADINE 10 MG/1
10 TABLET ORAL DAILY
COMMUNITY
Start: 2025-05-05

## 2025-06-19 RX ORDER — ALBUTEROL SULFATE 90 UG/1
2 INHALANT RESPIRATORY (INHALATION) EVERY 4 HOURS PRN
Qty: 1 EACH | Refills: 5 | Status: SHIPPED | OUTPATIENT
Start: 2025-06-19

## 2025-06-19 RX ORDER — ALBUTEROL SULFATE 90 UG/1
2 INHALANT RESPIRATORY (INHALATION) EVERY 4 HOURS PRN
Qty: 25.5 G | Refills: 0 | Status: SHIPPED | OUTPATIENT
Start: 2025-06-19 | End: 2025-06-19

## 2025-06-19 RX ORDER — HYDROCORTISONE ACETATE 25 MG/1
25 SUPPOSITORY RECTAL 2 TIMES DAILY
COMMUNITY

## 2025-06-19 RX ORDER — MONTELUKAST SODIUM 10 MG/1
TABLET ORAL
Qty: 90 TABLET | Refills: 3 | Status: SHIPPED | OUTPATIENT
Start: 2025-06-19

## 2025-06-19 RX ORDER — LANSOPRAZOLE 30 MG/1
30 CAPSULE, DELAYED RELEASE ORAL DAILY
COMMUNITY
Start: 2025-04-30

## 2025-06-19 RX ORDER — FLUTICASONE PROPIONATE AND SALMETEROL 500; 50 UG/1; UG/1
1 POWDER RESPIRATORY (INHALATION) 2 TIMES DAILY
Qty: 180 DEVICE | Refills: 6 | Status: SHIPPED | OUTPATIENT
Start: 2025-06-19

## 2025-06-20 NOTE — PROGRESS NOTES
Take 1 tablet by mouth at bedtime      aluminum-magnesium hydroxide 200-200 MG/5ML suspension Take by mouth every 6 hours as needed for Indigestion      ipratropium 0.5 mg-albuterol 2.5 mg (DUONEB) 0.5-2.5 (3) MG/3ML SOLN nebulizer solution INHALE THE CONTENTS OF 1 VIAL VIA NEBULIZER EVERY 6 HOURS AS NEEDED FOR WHEEZING OR SHORTNESS OF BREATH 360 mL 11    OXYGEN as directed      furosemide (LASIX) 40 MG tablet Take 0.5 tablets by mouth daily      potassium chloride (MICRO-K) 10 MEQ extended release capsule Take 1 capsule by mouth daily      fluticasone (FLONASE) 50 MCG/ACT nasal spray 2 sprays by Each Nostril route daily      albuterol sulfate HFA (PROVENTIL;VENTOLIN;PROAIR) 108 (90 Base) MCG/ACT inhaler INHALE 2 PUFFS BY MOUTH EVERY 4 HOURS AS NEEDED FOR WHEEZING 1 each 5    benzonatate (TESSALON) 100 MG capsule Take 1 capsule by mouth 3 times daily as needed for Cough with increased fluids (Patient not taking: Reported on 6/19/2025) 90 capsule 0    guaiFENesin (MUCINEX) 600 MG extended release tablet Take 2 tablets by mouth as needed (Patient not taking: Reported on 6/19/2025)      GNP BUDESONIDE NASAL SPRAY NA by Nasal route as needed (Patient not taking: Reported on 6/19/2025)       No current facility-administered medications for this visit.          Diagnostic Labs:    LABORATORY DATA:  [] Ordered [] Unavailable [x] Reviewed [] Independently interpreted  ABG:   No results found for: \"PH\", \"PHART\", \"POCPH\", \"PCO2\", \"JTU6CME\", \"POCPCO2\", \"PO2\", \"PO2ART\", \"POCPO2\", \"HCO3\", \"QGZ4BHE\", \"POCHCO3\", \"BE\", \"BEART\", \"O2SAT\", \"G5HTTHFH\", \"UTNO7RZU\"  VBG:  No results found for: \"PHVEN\", \"KNU9TDI\", \"BEVEN\", \"F8OBUDFG\"  CBC:   Lab Results   Component Value Date    WBC 6.7 05/20/2025    RBC 3.84 (L) 05/20/2025    HGB 10.9 (L) 05/20/2025    HCT 35.4 (L) 05/20/2025     05/20/2025    MCV 92.2 05/20/2025    MCH 28.4 05/20/2025    MCHC 30.8 05/20/2025    RDW 14.0 05/20/2025    LYMPHOPCT 22 (L) 05/20/2025    MONOPCT 8

## 2025-06-24 ENCOUNTER — HOSPITAL ENCOUNTER (OUTPATIENT)
Age: 70
Discharge: HOME | End: 2025-06-24
Payer: MEDICARE

## 2025-06-24 DIAGNOSIS — D80.1 HYPOGAMMAGLOBULINEMIA: ICD-10-CM

## 2025-06-24 DIAGNOSIS — D47.2 MGUS (MONOCLONAL GAMMOPATHY OF UNKNOWN SIGNIFICANCE): ICD-10-CM

## 2025-06-24 LAB
ALBUMIN SERPL-MCNC: 3.9 G/DL (ref 3.5–5.2)
ALBUMIN/GLOB SERPL: 1.5 {RATIO} (ref 1–2.5)
ALP SERPL-CCNC: 121 U/L (ref 35–104)
ALT SERPL-CCNC: 17 U/L (ref 10–35)
ANION GAP SERPL CALCULATED.3IONS-SCNC: 11 MMOL/L (ref 9–16)
AST SERPL-CCNC: 20 U/L (ref 10–35)
BASOPHILS # BLD: 0.05 K/UL (ref 0–0.2)
BASOPHILS NFR BLD: 1 % (ref 0–2)
BILIRUB SERPL-MCNC: 0.7 MG/DL (ref 0–1.2)
BUN SERPL-MCNC: 14 MG/DL (ref 8–23)
BUN/CREAT SERPL: 20 (ref 9–20)
CALCIUM SERPL-MCNC: 9.2 MG/DL (ref 8.6–10.4)
CHLORIDE SERPL-SCNC: 106 MMOL/L (ref 98–107)
CO2 SERPL-SCNC: 24 MMOL/L (ref 20–31)
CREAT SERPL-MCNC: 0.7 MG/DL (ref 0.6–0.9)
EOSINOPHIL # BLD: 0.27 K/UL (ref 0–0.44)
EOSINOPHILS RELATIVE PERCENT: 4 % (ref 1–4)
ERYTHROCYTE [DISTWIDTH] IN BLOOD BY AUTOMATED COUNT: 14.4 % (ref 11.8–14.4)
GFR, ESTIMATED: >90 ML/MIN/1.73M2
GLUCOSE SERPL-MCNC: 109 MG/DL (ref 74–99)
HCT VFR BLD AUTO: 33 % (ref 36.3–47.1)
HGB BLD-MCNC: 10.2 G/DL (ref 11.9–15.1)
IGA SERPL-MCNC: 88 MG/DL (ref 70–400)
IGG SERPL-MCNC: 830 MG/DL (ref 700–1600)
IGM SERPL-MCNC: 46 MG/DL (ref 40–230)
IMM GRANULOCYTES # BLD AUTO: <0.03 K/UL (ref 0–0.3)
IMM GRANULOCYTES NFR BLD: 0 %
LYMPHOCYTES NFR BLD: 1.79 K/UL (ref 1.1–3.7)
LYMPHOCYTES RELATIVE PERCENT: 26 % (ref 24–43)
MCH RBC QN AUTO: 27.1 PG (ref 25.2–33.5)
MCHC RBC AUTO-ENTMCNC: 30.9 G/DL (ref 25.2–33.5)
MCV RBC AUTO: 87.5 FL (ref 82.6–102.9)
MONOCYTES NFR BLD: 0.65 K/UL (ref 0.1–1.2)
MONOCYTES NFR BLD: 10 % (ref 3–12)
NEUTROPHILS NFR BLD: 59 % (ref 36–65)
NEUTS SEG NFR BLD: 4.04 K/UL (ref 1.5–8.1)
NRBC BLD-RTO: 0 PER 100 WBC
PLATELET # BLD AUTO: 349 K/UL (ref 138–453)
PMV BLD AUTO: 9.2 FL (ref 8.1–13.5)
POTASSIUM SERPL-SCNC: 4 MMOL/L (ref 3.7–5.3)
PROT SERPL-MCNC: 6.5 G/DL (ref 6.6–8.7)
RBC # BLD AUTO: 3.77 M/UL (ref 3.95–5.11)
SODIUM SERPL-SCNC: 141 MMOL/L (ref 136–145)
WBC OTHER # BLD: 6.8 K/UL (ref 3.5–11.3)

## 2025-06-24 PROCEDURE — 80053 COMPREHEN METABOLIC PANEL: CPT

## 2025-06-24 PROCEDURE — 82784 ASSAY IGA/IGD/IGG/IGM EACH: CPT

## 2025-06-24 PROCEDURE — 36415 COLL VENOUS BLD VENIPUNCTURE: CPT

## 2025-06-24 PROCEDURE — 85025 COMPLETE CBC W/AUTO DIFF WBC: CPT

## 2025-06-30 ENCOUNTER — TELEPHONE (OUTPATIENT)
Dept: INFUSION THERAPY | Age: 70
End: 2025-06-30

## 2025-07-24 ENCOUNTER — HOSPITAL ENCOUNTER (OUTPATIENT)
Age: 70
Discharge: HOME | End: 2025-07-24
Payer: MEDICARE

## 2025-07-24 DIAGNOSIS — D80.1 HYPOGAMMAGLOBULINEMIA: ICD-10-CM

## 2025-07-24 DIAGNOSIS — D47.2 MGUS (MONOCLONAL GAMMOPATHY OF UNKNOWN SIGNIFICANCE): ICD-10-CM

## 2025-07-24 LAB
ALBUMIN SERPL-MCNC: 4 G/DL (ref 3.5–5.2)
ALBUMIN/GLOB SERPL: 1.7 {RATIO} (ref 1–2.5)
ALP SERPL-CCNC: 98 U/L (ref 35–104)
ALT SERPL-CCNC: 22 U/L (ref 10–35)
ANION GAP SERPL CALCULATED.3IONS-SCNC: 10 MMOL/L (ref 9–16)
AST SERPL-CCNC: 20 U/L (ref 10–35)
BASOPHILS # BLD: 0.04 K/UL (ref 0–0.2)
BASOPHILS NFR BLD: 1 % (ref 0–2)
BILIRUB SERPL-MCNC: 0.6 MG/DL (ref 0–1.2)
BUN SERPL-MCNC: 14 MG/DL (ref 8–23)
BUN/CREAT SERPL: 23 (ref 9–20)
CALCIUM SERPL-MCNC: 9.2 MG/DL (ref 8.6–10.4)
CHLORIDE SERPL-SCNC: 105 MMOL/L (ref 98–107)
CO2 SERPL-SCNC: 27 MMOL/L (ref 20–31)
CREAT SERPL-MCNC: 0.6 MG/DL (ref 0.6–0.9)
EOSINOPHIL # BLD: 0.23 K/UL (ref 0–0.44)
EOSINOPHILS RELATIVE PERCENT: 3 % (ref 1–4)
ERYTHROCYTE [DISTWIDTH] IN BLOOD BY AUTOMATED COUNT: 15.6 % (ref 11.8–14.4)
GFR, ESTIMATED: >90 ML/MIN/1.73M2
GLUCOSE SERPL-MCNC: 100 MG/DL (ref 74–99)
HCT VFR BLD AUTO: 32.4 % (ref 36.3–47.1)
HGB BLD-MCNC: 9.9 G/DL (ref 11.9–15.1)
IGA SERPL-MCNC: 105 MG/DL (ref 70–400)
IGG SERPL-MCNC: 663 MG/DL (ref 700–1600)
IGM SERPL-MCNC: 48 MG/DL (ref 40–230)
IMM GRANULOCYTES # BLD AUTO: 0.03 K/UL (ref 0–0.3)
IMM GRANULOCYTES NFR BLD: 0 %
LYMPHOCYTES NFR BLD: 1.77 K/UL (ref 1.1–3.7)
LYMPHOCYTES RELATIVE PERCENT: 25 % (ref 24–43)
MCH RBC QN AUTO: 26.2 PG (ref 25.2–33.5)
MCHC RBC AUTO-ENTMCNC: 30.6 G/DL (ref 25.2–33.5)
MCV RBC AUTO: 85.7 FL (ref 82.6–102.9)
MONOCYTES NFR BLD: 0.61 K/UL (ref 0.1–1.2)
MONOCYTES NFR BLD: 9 % (ref 3–12)
NEUTROPHILS NFR BLD: 62 % (ref 36–65)
NEUTS SEG NFR BLD: 4.46 K/UL (ref 1.5–8.1)
NRBC BLD-RTO: 0 PER 100 WBC
PLATELET # BLD AUTO: 384 K/UL (ref 138–453)
PMV BLD AUTO: 9.1 FL (ref 8.1–13.5)
POTASSIUM SERPL-SCNC: 4.2 MMOL/L (ref 3.7–5.3)
PROT SERPL-MCNC: 6.4 G/DL (ref 6.6–8.7)
RBC # BLD AUTO: 3.78 M/UL (ref 3.95–5.11)
RBC # BLD: ABNORMAL 10*6/UL
SODIUM SERPL-SCNC: 142 MMOL/L (ref 136–145)
WBC OTHER # BLD: 7.1 K/UL (ref 3.5–11.3)

## 2025-07-24 PROCEDURE — 80053 COMPREHEN METABOLIC PANEL: CPT

## 2025-07-24 PROCEDURE — 82784 ASSAY IGA/IGD/IGG/IGM EACH: CPT

## 2025-07-24 PROCEDURE — 36415 COLL VENOUS BLD VENIPUNCTURE: CPT

## 2025-07-24 PROCEDURE — 85025 COMPLETE CBC W/AUTO DIFF WBC: CPT

## 2025-07-28 ENCOUNTER — OFFICE VISIT (OUTPATIENT)
Dept: ONCOLOGY | Age: 70
End: 2025-07-28
Payer: MEDICARE

## 2025-07-28 VITALS
OXYGEN SATURATION: 99 % | DIASTOLIC BLOOD PRESSURE: 82 MMHG | SYSTOLIC BLOOD PRESSURE: 126 MMHG | HEART RATE: 70 BPM | WEIGHT: 226.1 LBS | BODY MASS INDEX: 41.35 KG/M2

## 2025-07-28 DIAGNOSIS — D50.0 IRON DEFICIENCY ANEMIA DUE TO CHRONIC BLOOD LOSS: Primary | ICD-10-CM

## 2025-07-28 PROCEDURE — 3017F COLORECTAL CA SCREEN DOC REV: CPT | Performed by: INTERNAL MEDICINE

## 2025-07-28 PROCEDURE — G8417 CALC BMI ABV UP PARAM F/U: HCPCS | Performed by: INTERNAL MEDICINE

## 2025-07-28 PROCEDURE — 99214 OFFICE O/P EST MOD 30 MIN: CPT | Performed by: INTERNAL MEDICINE

## 2025-07-28 PROCEDURE — 3074F SYST BP LT 130 MM HG: CPT | Performed by: INTERNAL MEDICINE

## 2025-07-28 PROCEDURE — 3079F DIAST BP 80-89 MM HG: CPT | Performed by: INTERNAL MEDICINE

## 2025-07-28 PROCEDURE — 1124F ACP DISCUSS-NO DSCNMKR DOCD: CPT | Performed by: INTERNAL MEDICINE

## 2025-07-28 PROCEDURE — 1090F PRES/ABSN URINE INCON ASSESS: CPT | Performed by: INTERNAL MEDICINE

## 2025-07-28 PROCEDURE — G8400 PT W/DXA NO RESULTS DOC: HCPCS | Performed by: INTERNAL MEDICINE

## 2025-07-28 PROCEDURE — 99213 OFFICE O/P EST LOW 20 MIN: CPT | Performed by: INTERNAL MEDICINE

## 2025-07-28 PROCEDURE — 1159F MED LIST DOCD IN RCRD: CPT | Performed by: INTERNAL MEDICINE

## 2025-07-28 PROCEDURE — G8427 DOCREV CUR MEDS BY ELIG CLIN: HCPCS | Performed by: INTERNAL MEDICINE

## 2025-07-28 PROCEDURE — 1036F TOBACCO NON-USER: CPT | Performed by: INTERNAL MEDICINE

## 2025-07-28 RX ORDER — SODIUM CHLORIDE 9 MG/ML
5-250 INJECTION, SOLUTION INTRAVENOUS PRN
OUTPATIENT
Start: 2025-07-28

## 2025-07-28 RX ORDER — ACETAMINOPHEN 325 MG/1
650 TABLET ORAL
OUTPATIENT
Start: 2025-07-28

## 2025-07-28 RX ORDER — HEPARIN SODIUM (PORCINE) LOCK FLUSH IV SOLN 100 UNIT/ML 100 UNIT/ML
500 SOLUTION INTRAVENOUS PRN
OUTPATIENT
Start: 2025-07-28

## 2025-07-28 RX ORDER — SODIUM CHLORIDE 0.9 % (FLUSH) 0.9 %
5-40 SYRINGE (ML) INJECTION PRN
OUTPATIENT
Start: 2025-07-28

## 2025-07-28 RX ORDER — HYDROCORTISONE SODIUM SUCCINATE 100 MG/2ML
100 INJECTION INTRAMUSCULAR; INTRAVENOUS
OUTPATIENT
Start: 2025-07-28

## 2025-07-28 RX ORDER — ONDANSETRON 2 MG/ML
8 INJECTION INTRAMUSCULAR; INTRAVENOUS
OUTPATIENT
Start: 2025-07-28

## 2025-07-28 RX ORDER — SODIUM CHLORIDE 9 MG/ML
INJECTION, SOLUTION INTRAVENOUS PRN
OUTPATIENT
Start: 2025-07-28

## 2025-07-28 RX ORDER — DIPHENHYDRAMINE HYDROCHLORIDE 50 MG/ML
50 INJECTION, SOLUTION INTRAMUSCULAR; INTRAVENOUS
OUTPATIENT
Start: 2025-07-28

## 2025-07-28 RX ORDER — ALBUTEROL SULFATE 90 UG/1
4 INHALANT RESPIRATORY (INHALATION) PRN
OUTPATIENT
Start: 2025-07-28

## 2025-07-28 RX ORDER — EPINEPHRINE 1 MG/ML
0.3 INJECTION, SOLUTION, CONCENTRATE INTRAVENOUS PRN
OUTPATIENT
Start: 2025-07-28

## 2025-07-28 RX ORDER — FAMOTIDINE 10 MG/ML
20 INJECTION, SOLUTION INTRAVENOUS
OUTPATIENT
Start: 2025-07-28

## 2025-07-28 NOTE — PROGRESS NOTES
Patient ID: Thu Kaur, 1955, [birth year], 70 y.o.  Referred by : Doug Sotelo MD  Reason for consultation:   Anemia  Iron deficiency status post IV iron infusion  Currently on 1 iron pill daily  MGUS  Hypogammaglobinemia  HISTORY OF PRESENT ILLNESS:    Hematologic history:  Mine is a 65-year-old female with history of chronic anemia was seen there initial consultation visit.  She reports that she has history of anemia for past several years and for past 2 years she has been receiving intermittent PRBC and iron transfusions from her primary care physician.  She reports her last transfusion was about in August 2019 prior to her vascular surgery.  She reports she has taken iron pills in the past but had significant GI side effects so she had stopped.  She reports history of IBS and also had upper endoscopy and colonoscopy in 2018 was negative for any active bleeding.  Her GI evaluation was done at [facility].  Most recently her stool for occult blood was negative checked in March of this year.  Her CBC on 3/18/2020 showed hemoglobin of 9.9, WBC 10.2, platelets 431.  Her ferritin 13.3, iron 15, TIBC 366 and iron saturation 4% noted on 3/18/2020.    INTERVAL HISTORY:  Patient is returning for follow visit and to discuss lab results and further commissions.  She is feeling tired and fatigued.  Her hemoglobin has dropped further.  Denied any bleeding symptoms.  Denied any unintentional weight loss night sweats fever chills, recent infection.    During this visit patient's allergy, social, medical, surgical history and medications were reviewed and updated.    Past Medical History:   Diagnosis Date    Adjustment disorder with mixed emotional features 02/29/2020    Anemia 02/29/2020    Anxiety 02/29/2020    Asthma 03/03/2009    Benign neoplasm of stomach     Chronic airway obstruction (HCC)     Chronic idiopathic constipation     Chronic pain     Chronic sinusitis     Colon cancer (HCC)     Degeneration of lumbar

## 2025-08-13 ENCOUNTER — HOSPITAL ENCOUNTER (OUTPATIENT)
Dept: INFUSION THERAPY | Age: 70
Discharge: HOME | End: 2025-08-13
Payer: MEDICARE

## 2025-08-13 VITALS
OXYGEN SATURATION: 99 % | WEIGHT: 221 LBS | HEART RATE: 72 BPM | SYSTOLIC BLOOD PRESSURE: 142 MMHG | RESPIRATION RATE: 16 BRPM | TEMPERATURE: 97 F | DIASTOLIC BLOOD PRESSURE: 83 MMHG | BODY MASS INDEX: 39.16 KG/M2 | HEIGHT: 63 IN

## 2025-08-13 DIAGNOSIS — D80.1 HYPOGAMMAGLOBULINEMIA: Primary | ICD-10-CM

## 2025-08-13 PROCEDURE — 2580000003 HC RX 258: Performed by: INTERNAL MEDICINE

## 2025-08-13 PROCEDURE — 96366 THER/PROPH/DIAG IV INF ADDON: CPT

## 2025-08-13 PROCEDURE — 6360000002 HC RX W HCPCS: Performed by: INTERNAL MEDICINE

## 2025-08-13 PROCEDURE — 96365 THER/PROPH/DIAG IV INF INIT: CPT

## 2025-08-13 RX ORDER — DIPHENHYDRAMINE HYDROCHLORIDE 50 MG/ML
50 INJECTION, SOLUTION INTRAMUSCULAR; INTRAVENOUS
OUTPATIENT
Start: 2025-08-21

## 2025-08-13 RX ORDER — ONDANSETRON 2 MG/ML
8 INJECTION INTRAMUSCULAR; INTRAVENOUS
OUTPATIENT
Start: 2025-08-21

## 2025-08-13 RX ORDER — ALBUTEROL SULFATE 90 UG/1
4 INHALANT RESPIRATORY (INHALATION) PRN
OUTPATIENT
Start: 2025-08-21

## 2025-08-13 RX ORDER — SODIUM CHLORIDE 0.9 % (FLUSH) 0.9 %
5-40 SYRINGE (ML) INJECTION PRN
OUTPATIENT
Start: 2025-08-21

## 2025-08-13 RX ORDER — ACETAMINOPHEN 325 MG/1
650 TABLET ORAL
OUTPATIENT
Start: 2025-08-21

## 2025-08-13 RX ORDER — HYDROCORTISONE SODIUM SUCCINATE 100 MG/2ML
100 INJECTION INTRAMUSCULAR; INTRAVENOUS
OUTPATIENT
Start: 2025-08-21

## 2025-08-13 RX ORDER — DIPHENHYDRAMINE HCL 25 MG
25 TABLET ORAL ONCE
OUTPATIENT
Start: 2025-08-21 | End: 2025-08-21

## 2025-08-13 RX ORDER — EPINEPHRINE 1 MG/ML
0.3 INJECTION, SOLUTION, CONCENTRATE INTRAVENOUS PRN
OUTPATIENT
Start: 2025-08-21

## 2025-08-13 RX ORDER — SODIUM CHLORIDE 9 MG/ML
5-250 INJECTION, SOLUTION INTRAVENOUS PRN
OUTPATIENT
Start: 2025-08-21

## 2025-08-13 RX ORDER — HEPARIN 100 UNIT/ML
500 SYRINGE INTRAVENOUS PRN
OUTPATIENT
Start: 2025-08-21

## 2025-08-13 RX ORDER — ACETAMINOPHEN 325 MG/1
650 TABLET ORAL ONCE
OUTPATIENT
Start: 2025-08-21 | End: 2025-08-21

## 2025-08-13 RX ORDER — MEPERIDINE HYDROCHLORIDE 50 MG/ML
12.5 INJECTION INTRAMUSCULAR; INTRAVENOUS; SUBCUTANEOUS PRN
OUTPATIENT
Start: 2025-08-21

## 2025-08-13 RX ORDER — SODIUM CHLORIDE 9 MG/ML
INJECTION, SOLUTION INTRAVENOUS CONTINUOUS
OUTPATIENT
Start: 2025-08-21

## 2025-08-13 RX ORDER — SODIUM CHLORIDE 9 MG/ML
5-250 INJECTION, SOLUTION INTRAVENOUS PRN
Status: DISCONTINUED | OUTPATIENT
Start: 2025-08-13 | End: 2025-08-14 | Stop reason: HOSPADM

## 2025-08-13 RX ADMIN — SODIUM CHLORIDE 20 ML/HR: 0.9 INJECTION, SOLUTION INTRAVENOUS at 09:09

## 2025-08-13 RX ADMIN — IMMUNE GLOBULIN (HUMAN) 40 G: 10 INJECTION INTRAVENOUS; SUBCUTANEOUS at 09:19

## 2025-08-15 ENCOUNTER — HOSPITAL ENCOUNTER (OUTPATIENT)
Dept: INFUSION THERAPY | Age: 70
Discharge: HOME | End: 2025-08-15
Payer: MEDICARE

## 2025-08-15 VITALS
SYSTOLIC BLOOD PRESSURE: 157 MMHG | HEART RATE: 76 BPM | OXYGEN SATURATION: 96 % | DIASTOLIC BLOOD PRESSURE: 78 MMHG | TEMPERATURE: 97.8 F | RESPIRATION RATE: 16 BRPM

## 2025-08-15 DIAGNOSIS — D50.0 IRON DEFICIENCY ANEMIA DUE TO CHRONIC BLOOD LOSS: Primary | ICD-10-CM

## 2025-08-15 PROCEDURE — 96365 THER/PROPH/DIAG IV INF INIT: CPT

## 2025-08-15 PROCEDURE — 2580000003 HC RX 258: Performed by: INTERNAL MEDICINE

## 2025-08-15 PROCEDURE — 96366 THER/PROPH/DIAG IV INF ADDON: CPT

## 2025-08-15 PROCEDURE — 6360000002 HC RX W HCPCS: Performed by: INTERNAL MEDICINE

## 2025-08-15 RX ORDER — SODIUM CHLORIDE 9 MG/ML
5-250 INJECTION, SOLUTION INTRAVENOUS PRN
Status: CANCELLED | OUTPATIENT
Start: 2025-08-29

## 2025-08-15 RX ORDER — HEPARIN 100 UNIT/ML
500 SYRINGE INTRAVENOUS PRN
Status: CANCELLED | OUTPATIENT
Start: 2025-08-29

## 2025-08-15 RX ORDER — SODIUM CHLORIDE 9 MG/ML
INJECTION, SOLUTION INTRAVENOUS PRN
Status: CANCELLED | OUTPATIENT
Start: 2025-08-29

## 2025-08-15 RX ORDER — HYDROCORTISONE SODIUM SUCCINATE 100 MG/2ML
100 INJECTION INTRAMUSCULAR; INTRAVENOUS
Status: CANCELLED | OUTPATIENT
Start: 2025-08-29

## 2025-08-15 RX ORDER — ALBUTEROL SULFATE 90 UG/1
4 INHALANT RESPIRATORY (INHALATION) PRN
Status: CANCELLED | OUTPATIENT
Start: 2025-08-29

## 2025-08-15 RX ORDER — SODIUM CHLORIDE 0.9 % (FLUSH) 0.9 %
5-40 SYRINGE (ML) INJECTION PRN
Status: CANCELLED | OUTPATIENT
Start: 2025-08-29

## 2025-08-15 RX ORDER — DIPHENHYDRAMINE HYDROCHLORIDE 50 MG/ML
50 INJECTION, SOLUTION INTRAMUSCULAR; INTRAVENOUS
Status: CANCELLED | OUTPATIENT
Start: 2025-08-29

## 2025-08-15 RX ORDER — ONDANSETRON 2 MG/ML
8 INJECTION INTRAMUSCULAR; INTRAVENOUS
Status: CANCELLED | OUTPATIENT
Start: 2025-08-29

## 2025-08-15 RX ORDER — EPINEPHRINE 1 MG/ML
0.3 INJECTION, SOLUTION, CONCENTRATE INTRAVENOUS PRN
Status: CANCELLED | OUTPATIENT
Start: 2025-08-29

## 2025-08-15 RX ORDER — SODIUM CHLORIDE 9 MG/ML
5-250 INJECTION, SOLUTION INTRAVENOUS PRN
Status: DISCONTINUED | OUTPATIENT
Start: 2025-08-15 | End: 2025-08-16 | Stop reason: HOSPADM

## 2025-08-15 RX ORDER — ACETAMINOPHEN 325 MG/1
650 TABLET ORAL
Status: CANCELLED | OUTPATIENT
Start: 2025-08-29

## 2025-08-15 RX ADMIN — SODIUM CHLORIDE 30 ML/HR: 0.9 INJECTION, SOLUTION INTRAVENOUS at 08:32

## 2025-08-15 RX ADMIN — IRON SUCROSE 300 MG: 20 INJECTION, SOLUTION INTRAVENOUS at 08:40

## 2025-08-15 ASSESSMENT — PAIN SCALES - GENERAL: PAINLEVEL_OUTOF10: 0

## 2025-08-20 DIAGNOSIS — J45.40 MODERATE PERSISTENT ASTHMA WITHOUT COMPLICATION: ICD-10-CM

## 2025-08-20 RX ORDER — MONTELUKAST SODIUM 10 MG/1
TABLET ORAL
Qty: 90 TABLET | Refills: 3 | Status: SHIPPED | OUTPATIENT
Start: 2025-08-20

## 2025-08-21 ENCOUNTER — HOSPITAL ENCOUNTER (OUTPATIENT)
Dept: INFUSION THERAPY | Age: 70
Discharge: HOME | End: 2025-08-21
Payer: MEDICARE

## 2025-08-21 VITALS
HEART RATE: 78 BPM | OXYGEN SATURATION: 99 % | RESPIRATION RATE: 16 BRPM | DIASTOLIC BLOOD PRESSURE: 85 MMHG | TEMPERATURE: 97.6 F | SYSTOLIC BLOOD PRESSURE: 137 MMHG

## 2025-08-21 DIAGNOSIS — D50.0 IRON DEFICIENCY ANEMIA DUE TO CHRONIC BLOOD LOSS: Primary | ICD-10-CM

## 2025-08-21 PROCEDURE — 6360000002 HC RX W HCPCS: Performed by: INTERNAL MEDICINE

## 2025-08-21 PROCEDURE — 96366 THER/PROPH/DIAG IV INF ADDON: CPT

## 2025-08-21 PROCEDURE — 2580000003 HC RX 258: Performed by: INTERNAL MEDICINE

## 2025-08-21 PROCEDURE — 96365 THER/PROPH/DIAG IV INF INIT: CPT

## 2025-08-21 RX ORDER — ONDANSETRON 2 MG/ML
8 INJECTION INTRAMUSCULAR; INTRAVENOUS
OUTPATIENT
Start: 2025-08-28

## 2025-08-21 RX ORDER — SODIUM CHLORIDE 9 MG/ML
5-250 INJECTION, SOLUTION INTRAVENOUS PRN
Status: CANCELLED | OUTPATIENT
Start: 2025-08-28

## 2025-08-21 RX ORDER — DIPHENHYDRAMINE HYDROCHLORIDE 50 MG/ML
50 INJECTION, SOLUTION INTRAMUSCULAR; INTRAVENOUS
OUTPATIENT
Start: 2025-08-28

## 2025-08-21 RX ORDER — SODIUM CHLORIDE 9 MG/ML
5-250 INJECTION, SOLUTION INTRAVENOUS PRN
Status: DISCONTINUED | OUTPATIENT
Start: 2025-08-21 | End: 2025-08-22 | Stop reason: HOSPADM

## 2025-08-21 RX ORDER — ACETAMINOPHEN 325 MG/1
650 TABLET ORAL
OUTPATIENT
Start: 2025-08-28

## 2025-08-21 RX ORDER — HYDROCORTISONE SODIUM SUCCINATE 100 MG/2ML
100 INJECTION INTRAMUSCULAR; INTRAVENOUS
OUTPATIENT
Start: 2025-08-28

## 2025-08-21 RX ORDER — HEPARIN 100 UNIT/ML
500 SYRINGE INTRAVENOUS PRN
OUTPATIENT
Start: 2025-08-28

## 2025-08-21 RX ORDER — EPINEPHRINE 1 MG/ML
0.3 INJECTION, SOLUTION, CONCENTRATE INTRAVENOUS PRN
OUTPATIENT
Start: 2025-08-28

## 2025-08-21 RX ORDER — ALBUTEROL SULFATE 90 UG/1
4 INHALANT RESPIRATORY (INHALATION) PRN
OUTPATIENT
Start: 2025-08-28

## 2025-08-21 RX ORDER — SODIUM CHLORIDE 9 MG/ML
5-250 INJECTION, SOLUTION INTRAVENOUS PRN
OUTPATIENT
Start: 2025-08-28

## 2025-08-21 RX ORDER — SODIUM CHLORIDE 9 MG/ML
INJECTION, SOLUTION INTRAVENOUS PRN
OUTPATIENT
Start: 2025-08-28

## 2025-08-21 RX ORDER — SODIUM CHLORIDE 0.9 % (FLUSH) 0.9 %
5-40 SYRINGE (ML) INJECTION PRN
OUTPATIENT
Start: 2025-08-28

## 2025-08-21 RX ADMIN — IRON SUCROSE 300 MG: 20 INJECTION, SOLUTION INTRAVENOUS at 12:36

## 2025-08-21 RX ADMIN — SODIUM CHLORIDE 20 ML/HR: 0.9 INJECTION, SOLUTION INTRAVENOUS at 12:29

## 2025-08-27 ENCOUNTER — HOSPITAL ENCOUNTER (OUTPATIENT)
Dept: INFUSION THERAPY | Age: 70
Discharge: HOME | End: 2025-08-27
Payer: MEDICARE

## 2025-08-27 VITALS
HEART RATE: 65 BPM | TEMPERATURE: 97.6 F | OXYGEN SATURATION: 99 % | RESPIRATION RATE: 16 BRPM | DIASTOLIC BLOOD PRESSURE: 58 MMHG | SYSTOLIC BLOOD PRESSURE: 133 MMHG

## 2025-08-27 DIAGNOSIS — D50.0 IRON DEFICIENCY ANEMIA DUE TO CHRONIC BLOOD LOSS: Primary | ICD-10-CM

## 2025-08-27 PROCEDURE — 96366 THER/PROPH/DIAG IV INF ADDON: CPT

## 2025-08-27 PROCEDURE — 2580000003 HC RX 258: Performed by: INTERNAL MEDICINE

## 2025-08-27 PROCEDURE — 6360000002 HC RX W HCPCS: Performed by: INTERNAL MEDICINE

## 2025-08-27 PROCEDURE — 96365 THER/PROPH/DIAG IV INF INIT: CPT

## 2025-08-27 RX ORDER — ACETAMINOPHEN 325 MG/1
650 TABLET ORAL
OUTPATIENT
Start: 2025-09-12

## 2025-08-27 RX ORDER — SODIUM CHLORIDE 9 MG/ML
5-250 INJECTION, SOLUTION INTRAVENOUS PRN
OUTPATIENT
Start: 2025-09-12

## 2025-08-27 RX ORDER — ALBUTEROL SULFATE 90 UG/1
4 INHALANT RESPIRATORY (INHALATION) PRN
OUTPATIENT
Start: 2025-09-12

## 2025-08-27 RX ORDER — HYDROCORTISONE SODIUM SUCCINATE 100 MG/2ML
100 INJECTION INTRAMUSCULAR; INTRAVENOUS
OUTPATIENT
Start: 2025-09-12

## 2025-08-27 RX ORDER — EPINEPHRINE 1 MG/ML
0.3 INJECTION, SOLUTION, CONCENTRATE INTRAVENOUS PRN
OUTPATIENT
Start: 2025-09-12

## 2025-08-27 RX ORDER — SODIUM CHLORIDE 9 MG/ML
INJECTION, SOLUTION INTRAVENOUS PRN
OUTPATIENT
Start: 2025-09-12

## 2025-08-27 RX ORDER — ONDANSETRON 2 MG/ML
8 INJECTION INTRAMUSCULAR; INTRAVENOUS
OUTPATIENT
Start: 2025-09-12

## 2025-08-27 RX ORDER — SODIUM CHLORIDE 9 MG/ML
5-250 INJECTION, SOLUTION INTRAVENOUS PRN
Status: DISCONTINUED | OUTPATIENT
Start: 2025-08-27 | End: 2025-08-28 | Stop reason: HOSPADM

## 2025-08-27 RX ORDER — HEPARIN 100 UNIT/ML
500 SYRINGE INTRAVENOUS PRN
OUTPATIENT
Start: 2025-09-12

## 2025-08-27 RX ORDER — SODIUM CHLORIDE 0.9 % (FLUSH) 0.9 %
5-40 SYRINGE (ML) INJECTION PRN
OUTPATIENT
Start: 2025-09-12

## 2025-08-27 RX ORDER — DIPHENHYDRAMINE HYDROCHLORIDE 50 MG/ML
50 INJECTION, SOLUTION INTRAMUSCULAR; INTRAVENOUS
OUTPATIENT
Start: 2025-09-12

## 2025-08-27 RX ADMIN — SODIUM CHLORIDE 400 MG: 900 INJECTION, SOLUTION INTRAVENOUS at 11:45

## 2025-08-27 RX ADMIN — SODIUM CHLORIDE 50 ML/HR: 0.9 INJECTION, SOLUTION INTRAVENOUS at 11:31

## 2025-09-06 ENCOUNTER — HOSPITAL ENCOUNTER (OUTPATIENT)
Dept: LAB | Age: 70
Discharge: HOME | End: 2025-09-06
Payer: MEDICARE

## 2025-09-06 DIAGNOSIS — D80.1 HYPOGAMMAGLOBULINEMIA: ICD-10-CM

## 2025-09-06 DIAGNOSIS — D50.0 IRON DEFICIENCY ANEMIA DUE TO CHRONIC BLOOD LOSS: ICD-10-CM

## 2025-09-06 LAB
ALBUMIN SERPL-MCNC: 4.2 G/DL (ref 3.5–5.2)
ALBUMIN/GLOB SERPL: 1.6 {RATIO} (ref 1–2.5)
ALP SERPL-CCNC: 106 U/L (ref 35–104)
ALT SERPL-CCNC: 15 U/L (ref 10–35)
ANION GAP SERPL CALCULATED.3IONS-SCNC: 10 MMOL/L (ref 9–16)
AST SERPL-CCNC: 22 U/L (ref 10–35)
BASOPHILS # BLD: 0.05 K/UL (ref 0–0.2)
BASOPHILS NFR BLD: 1 % (ref 0–2)
BILIRUB SERPL-MCNC: 1.1 MG/DL (ref 0–1.2)
BUN SERPL-MCNC: 12 MG/DL (ref 8–23)
BUN/CREAT SERPL: 20 (ref 9–20)
CALCIUM SERPL-MCNC: 9.3 MG/DL (ref 8.6–10.4)
CHLORIDE SERPL-SCNC: 104 MMOL/L (ref 98–107)
CO2 SERPL-SCNC: 25 MMOL/L (ref 20–31)
CREAT SERPL-MCNC: 0.6 MG/DL (ref 0.6–0.9)
EOSINOPHIL # BLD: 0.18 K/UL (ref 0–0.44)
EOSINOPHILS RELATIVE PERCENT: 3 % (ref 1–4)
ERYTHROCYTE [DISTWIDTH] IN BLOOD BY AUTOMATED COUNT: 22.1 % (ref 11.8–14.4)
FERRITIN SERPL-MCNC: 261 NG/ML
GFR, ESTIMATED: >90 ML/MIN/1.73M2
GLUCOSE SERPL-MCNC: 103 MG/DL (ref 74–99)
HCT VFR BLD AUTO: 39.1 % (ref 36.3–47.1)
HGB BLD-MCNC: 11.9 G/DL (ref 11.9–15.1)
IMM GRANULOCYTES # BLD AUTO: <0.03 K/UL (ref 0–0.3)
IMM GRANULOCYTES NFR BLD: 0 %
IRON SATN MFR SERPL: 28 % (ref 20–55)
IRON SERPL-MCNC: 89 UG/DL (ref 37–145)
LYMPHOCYTES NFR BLD: 1.83 K/UL (ref 1.1–3.7)
LYMPHOCYTES RELATIVE PERCENT: 33 % (ref 24–43)
MCH RBC QN AUTO: 27.2 PG (ref 25.2–33.5)
MCHC RBC AUTO-ENTMCNC: 30.4 G/DL (ref 25.2–33.5)
MCV RBC AUTO: 89.3 FL (ref 82.6–102.9)
MONOCYTES NFR BLD: 0.52 K/UL (ref 0.1–1.2)
MONOCYTES NFR BLD: 9 % (ref 3–12)
NEUTROPHILS NFR BLD: 53 % (ref 36–65)
NEUTS SEG NFR BLD: 2.96 K/UL (ref 1.5–8.1)
NRBC BLD-RTO: 0 PER 100 WBC
PLATELET # BLD AUTO: 325 K/UL (ref 138–453)
PMV BLD AUTO: 9.4 FL (ref 8.1–13.5)
POTASSIUM SERPL-SCNC: 4.6 MMOL/L (ref 3.7–5.3)
PROT SERPL-MCNC: 6.9 G/DL (ref 6.6–8.7)
RBC # BLD AUTO: 4.38 M/UL (ref 3.95–5.11)
SODIUM SERPL-SCNC: 139 MMOL/L (ref 136–145)
TIBC SERPL-MCNC: 313 UG/DL (ref 250–450)
UNSATURATED IRON BINDING CAPACITY: 224 UG/DL (ref 112–347)
WBC OTHER # BLD: 5.6 K/UL (ref 3.5–11.3)

## 2025-09-06 PROCEDURE — 83540 ASSAY OF IRON: CPT

## 2025-09-06 PROCEDURE — 85025 COMPLETE CBC W/AUTO DIFF WBC: CPT

## 2025-09-06 PROCEDURE — 83550 IRON BINDING TEST: CPT

## 2025-09-06 PROCEDURE — 36415 COLL VENOUS BLD VENIPUNCTURE: CPT

## 2025-09-06 PROCEDURE — 80053 COMPREHEN METABOLIC PANEL: CPT

## 2025-09-06 PROCEDURE — 82728 ASSAY OF FERRITIN: CPT

## (undated) DEVICE — FORCEPS BX L240CM JAW DIA2.4MM ORNG L CAP W/ NDL DISP RAD

## (undated) DEVICE — ELECTRODE PT RET AD L9FT HI MOIST COND ADH HYDRGEL CORDED

## (undated) DEVICE — LINE SAMP O2 6.5FT W/FEMALE CONN F/ADULT CAPNOLINE PLUS

## (undated) DEVICE — GLOVE SURG SZ 8 L12IN THK91MIL BRN LTX FREE POLYCHLOROPRENE

## (undated) DEVICE — BANDAGE ADH DIA7/8IN NAT FLEX-FABRIC WVN FOR WND PROTCT

## (undated) DEVICE — GLOVE ORANGE PI 8   MSG9080

## (undated) DEVICE — GLOVE ORANGE PI 7   MSG9070

## (undated) DEVICE — CHLORAPREP 26ML CLEAR

## (undated) DEVICE — Device

## (undated) DEVICE — NEEDLE, QUINCKE, 22GX5": Brand: MEDLINE

## (undated) DEVICE — SHORTSHOT SAEED HEMORRHOIDAL MULTI-BAND LIGATOR WITH TRIVIEW ANOSCOPE: Brand: SHORTSHOT

## (undated) DEVICE — 4-PORT MANIFOLD: Brand: NEPTUNE 2

## (undated) DEVICE — CO2 CANNULA,SSOFT,ADLT,7O2,4CO2,FEMALE: Brand: MEDLINE

## (undated) DEVICE — FORCEPS BX L240CM JAW DIA2.2MM RAD JAW 4 HOT DISP

## (undated) DEVICE — TRAY PAIN CUST

## (undated) DEVICE — MERCY DEFIANCE ENDO KIT: Brand: MEDLINE INDUSTRIES, INC.

## (undated) DEVICE — BITE BLOCK W/VELCRO STRAP